# Patient Record
Sex: FEMALE | Race: OTHER | HISPANIC OR LATINO | Employment: FULL TIME | URBAN - METROPOLITAN AREA
[De-identification: names, ages, dates, MRNs, and addresses within clinical notes are randomized per-mention and may not be internally consistent; named-entity substitution may affect disease eponyms.]

---

## 2017-01-04 ENCOUNTER — OFFICE VISIT (OUTPATIENT)
Dept: PSYCHIATRY | Facility: CLINIC | Age: 27
End: 2017-01-04
Payer: COMMERCIAL

## 2017-01-04 VITALS
DIASTOLIC BLOOD PRESSURE: 58 MMHG | BODY MASS INDEX: 29.89 KG/M2 | SYSTOLIC BLOOD PRESSURE: 103 MMHG | WEIGHT: 148 LBS | HEART RATE: 87 BPM

## 2017-01-04 DIAGNOSIS — F41.1 GAD (GENERALIZED ANXIETY DISORDER): ICD-10-CM

## 2017-01-04 DIAGNOSIS — F80.81 STUTTERING: ICD-10-CM

## 2017-01-04 DIAGNOSIS — F84.5 ASPERGER SYNDROME: ICD-10-CM

## 2017-01-04 PROCEDURE — 1159F MED LIST DOCD IN RCRD: CPT | Mod: S$GLB,,, | Performed by: PSYCHIATRY & NEUROLOGY

## 2017-01-04 PROCEDURE — 99213 OFFICE O/P EST LOW 20 MIN: CPT | Mod: S$GLB,,, | Performed by: PSYCHIATRY & NEUROLOGY

## 2017-01-04 PROCEDURE — 99999 PR PBB SHADOW E&M-EST. PATIENT-LVL II: CPT | Mod: PBBFAC,,, | Performed by: PSYCHIATRY & NEUROLOGY

## 2017-01-04 PROCEDURE — 90833 PSYTX W PT W E/M 30 MIN: CPT | Mod: S$GLB,,, | Performed by: PSYCHIATRY & NEUROLOGY

## 2017-01-04 RX ORDER — CITALOPRAM 40 MG/1
40 TABLET, FILM COATED ORAL DAILY
Qty: 90 TABLET | Refills: 0 | Status: SHIPPED | OUTPATIENT
Start: 2017-01-04 | End: 2017-05-02 | Stop reason: SDUPTHER

## 2017-01-04 NOTE — PROGRESS NOTES
Outpatient Psychiatry Follow-Up Visit (MD/NP)    1/4/2017    Clinical Status of Patient:  Outpatient (Ambulatory)    Chief Complaint:  Tammy Bonner is a 26 y.o. female who presents today for follow-up of anxiety primarily, hx of low mood, stuttering, asperger's associated issues        Met with patient and mother.      Interval History and Content of Current Session:  Interim Events/Subjective Report/Content of Current Session:     Pt reports she is now working full time at a different branch of the [employer].  She is getting along well with others there and thinks it is a good environment.  She feel that she is getting better at her job.      She reports her mood has been good.  She reports crying is less frequent.  She reports she has lost her temper once or twice lately, typically around her menstrual cycle related to stressing about things she would not typically worry about.  She worries about being able to get  and take the honeymoon they want to [place].  She sleeps pretty well though it is variable.  Manages to make it to work on time.  She denies recent SI.    She is working on eating less at night and portion control.      Psychotherapy:  · Target symptoms: mood swings, impulsivity, SI  · Why chosen therapy is appropriate versus another modality: relevant to diagnosis, patient responds to this modality  · Outcome monitoring methods: self-report, observation  · Therapeutic intervention type: supportive psychotherapy  · Topics discussed/themes: work stress, difficulty managing affect in interpersonal relationships, building skills sets for symptom management, financial stressors, life stage transitional issues.    · The patient's response to the intervention is accepting. The patient's progress toward treatment goals is good.   · Duration of intervention: 16 mins    Review of Systems   · PSYCHIATRIC: Pertinant items are noted in the narrative.  · RESPIRATORY: No shortness of breath.  · CARDIOVASCULAR:  No tachycardia or chest pain.  · GASTROINTESTINAL: No nausea, vomiting, pain, constipation or diarrhea.    Past Medical, Family and Social History: The patient's past medical, family and social history have been reviewed and updated as appropriate within the electronic medical record - see encounter notes.    Compliance: yes  Side effects: None    Risk Parameters:  Patient reports no suicidal ideation  Patient reports no homicidal ideation  Patient reports no self-injurious behavior  Patient reports no violent behavior    Exam (detailed: at least 9 elements; comprehensive: all 15 elements)   Constitutional  Vitals:  Most recent vital signs, dated less than 90 days prior to this appointment, were reviewed.    Vitals:    01/04/17 0844   BP: (!) 103/58   Pulse: 87   Weight: 67.1 kg (148 lb)      General:  age appropriate, casually dressed, neatly groomed, uniform belen shirt     Musculoskeletal  Muscle Strength/Tone:  no tremor   Gait & Station:  normal, no ataxia     Psychiatric  Speech:  clearly audible, quite verbal, some mild studder, at times rapid   Mood:   Affect:  euthymic  congruent and appropriate   Thought Process:  logical   Associations:  intact   Thought Content:  normal, no suicidality, no homicidality, delusions, or paranoia   Insight:   Judgement:  fair  adequate to circumstances   Orientation:  grossly intact   Memory: intact for content of interview   Language: grossly intact   Attention Span & Concentration:  able to focus   Fund of Knowledge:  intact and appropriate to age and level of education     Assessment and Diagnosis   Status/Progress: Based on the examination today, the patient's problem(s) is/are adequately but not ideally controlled.  New problems have been presented today.   Comorbidities are complicating management of the primary condition:Asperger's complicating her DORIS.  There are no active rule-out diagnoses for this patient at this time.    General Impression: 26 y.o. F with  Asperger's and DORIS.  DORIS relatively well controlled currently.   Has some problems with anger at times, typically with parents.      Diagnosis:  Pervasive Developmental Disorders: Asperger's Disorder (299.80)  Generalized Anxiety Disorder (300.02).  stuttering       Intervention/Counseling/Treatment Plan   · Continue current medication, citalopram 40mg daily.  · Discussed psychotherapy referral if ever needed.      Return to Clinic: 3 months

## 2017-01-09 ENCOUNTER — OFFICE VISIT (OUTPATIENT)
Dept: SURGERY | Facility: CLINIC | Age: 27
End: 2017-01-09
Payer: COMMERCIAL

## 2017-01-09 VITALS
SYSTOLIC BLOOD PRESSURE: 108 MMHG | DIASTOLIC BLOOD PRESSURE: 67 MMHG | BODY MASS INDEX: 29.77 KG/M2 | HEART RATE: 95 BPM | HEIGHT: 59 IN | TEMPERATURE: 99 F | WEIGHT: 147.69 LBS

## 2017-01-09 DIAGNOSIS — Z12.39 SCREENING BREAST EXAMINATION: Primary | ICD-10-CM

## 2017-01-09 DIAGNOSIS — D24.9 FIBROADENOMA OF BREAST, UNSPECIFIED LATERALITY: ICD-10-CM

## 2017-01-09 PROCEDURE — 99213 OFFICE O/P EST LOW 20 MIN: CPT | Mod: S$GLB,,, | Performed by: NURSE PRACTITIONER

## 2017-01-09 PROCEDURE — 1159F MED LIST DOCD IN RCRD: CPT | Mod: S$GLB,,, | Performed by: NURSE PRACTITIONER

## 2017-01-09 PROCEDURE — 99999 PR PBB SHADOW E&M-EST. PATIENT-LVL III: CPT | Mod: PBBFAC,,, | Performed by: NURSE PRACTITIONER

## 2017-01-09 NOTE — PROGRESS NOTES
Subjective:      Patient ID: Tammy Bonner is a 26 y.o. female.    Chief Complaint: Breast Cancer Screening (CBE)      HPI: (PF, EPF - 1-3) (Detailed, Comp, - 4)  returning patient presents for f/u, bilateral masses at 3 o'clock consistent with fibroadenomas, see previous notes for history. She reports no new mass or change in size of these two findings. Denies breast pain, nipple discharge, skin changes.     LMP 1-5-2017      Review of Systems  Objective:   Physical Exam   Pulmonary/Chest: She exhibits deformity and retraction. She exhibits no mass, no tenderness, no edema and no swelling. Right breast exhibits mass. Right breast exhibits no inverted nipple, no nipple discharge and no tenderness. Left breast exhibits mass. Left breast exhibits no inverted nipple, no nipple discharge, no skin change and no tenderness. There is no breast swelling.   Small, oval, 8mm mass right at 3 o'clock and 1.0cm left at 3 o'clock , both mobile, nontender and clinically consistent with benign FA   No upper extremity lymphedema  Breathing non-labored    Lymphadenopathy:     She has no cervical adenopathy.     She has no axillary adenopathy.        Right: No supraclavicular adenopathy present.        Left: No supraclavicular adenopathy present.     Assessment:       1. Screening breast examination    2. Fibroadenoma of breast, unspecified laterality        Plan:     bilateral breast masses stable, consistent with FA  Return in one year CBE   Call for any interval palpable breast mass, pain, nipple discharge, skin changes or other breast related concerns

## 2017-05-02 ENCOUNTER — OFFICE VISIT (OUTPATIENT)
Dept: PSYCHIATRY | Facility: CLINIC | Age: 27
End: 2017-05-02
Payer: COMMERCIAL

## 2017-05-02 ENCOUNTER — PATIENT MESSAGE (OUTPATIENT)
Dept: INTERNAL MEDICINE | Facility: CLINIC | Age: 27
End: 2017-05-02

## 2017-05-02 VITALS
DIASTOLIC BLOOD PRESSURE: 61 MMHG | SYSTOLIC BLOOD PRESSURE: 112 MMHG | HEIGHT: 59 IN | WEIGHT: 148.19 LBS | HEART RATE: 89 BPM | BODY MASS INDEX: 29.88 KG/M2

## 2017-05-02 DIAGNOSIS — F41.1 GAD (GENERALIZED ANXIETY DISORDER): ICD-10-CM

## 2017-05-02 DIAGNOSIS — F80.81 STUTTERING: ICD-10-CM

## 2017-05-02 DIAGNOSIS — F84.5 ASPERGER SYNDROME: ICD-10-CM

## 2017-05-02 PROCEDURE — 90833 PSYTX W PT W E/M 30 MIN: CPT | Mod: S$GLB,,, | Performed by: PSYCHIATRY & NEUROLOGY

## 2017-05-02 PROCEDURE — 99213 OFFICE O/P EST LOW 20 MIN: CPT | Mod: S$GLB,,, | Performed by: PSYCHIATRY & NEUROLOGY

## 2017-05-02 PROCEDURE — 99999 PR PBB SHADOW E&M-EST. PATIENT-LVL II: CPT | Mod: PBBFAC,,, | Performed by: PSYCHIATRY & NEUROLOGY

## 2017-05-02 RX ORDER — CITALOPRAM 40 MG/1
40 TABLET, FILM COATED ORAL DAILY
Qty: 90 TABLET | Refills: 0 | Status: SHIPPED | OUTPATIENT
Start: 2017-05-02 | End: 2017-07-31 | Stop reason: SDUPTHER

## 2017-05-02 NOTE — PROGRESS NOTES
Outpatient Psychiatry Follow-Up Visit (MD/NP)    5/2/2017    Clinical Status of Patient:  Outpatient (Ambulatory)    Chief Complaint:  Tammy Bonner is a 26 y.o. female who presents today for follow-up of anxiety primarily, hx of low mood, stuttering, asperger's associated issues        Met with patient.      Interval History and Content of Current Session:  Interim Events/Subjective Report/Content of Current Session:   Pt reports she has been doing well.  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  She reports procrastination can be severe at times ending up doing obligations later in the night than planned.  She has some difficulty getting up in the morning , hitting the snooze button.  She has a lack of disciplining herself.  She gets 7 hrs of sleep at night but in the morning she feels like staying in bed.      She reports an episode of near fainting at home during a stressful period.  She admits to some crying spells, sometimes when she and her parents have a conflict, usually around her menstrual cycle.  She admits to some worry related to her relationship.  She denies having had any recent SI.      Reports her relationship is going well.  Some disappointment with financial progress toward wedding and dealing with bf's grandmother.      Psychotherapy:  · Target symptoms: mood swings, impulsivity, SI  · Why chosen therapy is appropriate versus another modality: relevant to diagnosis, patient responds to this modality  · Outcome monitoring methods: self-report, observation  · Therapeutic intervention type: supportive psychotherapy  · Topics discussed/themes: relationships difficulties, difficulty managing affect in interpersonal relationships, building skills sets for symptom management, symptom recognition, financial stressors, life stage transitional issues.    · The patient's response to the intervention  "is accepting. The patient's progress toward treatment goals is good.   · Duration of intervention: 16 mins    Review of Systems   · PSYCHIATRIC: Pertinant items are noted in the narrative.  · RESPIRATORY: No shortness of breath.  · CARDIOVASCULAR: No tachycardia or chest pain.  · GASTROINTESTINAL: No nausea, vomiting, pain, constipation or diarrhea.    Past Medical, Family and Social History: The patient's past medical, family and social history have been reviewed and updated as appropriate within the electronic medical record - see encounter notes.    Compliance: yes  Side effects: None    Risk Parameters:  Patient reports no suicidal ideation  Patient reports no homicidal ideation  Patient reports no self-injurious behavior  Patient reports no violent behavior    Exam (detailed: at least 9 elements; comprehensive: all 15 elements)   Constitutional  Vitals:  Most recent vital signs, dated less than 90 days prior to this appointment, were reviewed.    Vitals:    05/02/17 0842   BP: 112/61   Pulse: 89   Weight: 67.2 kg (148 lb 3.2 oz)   Height: 4' 11" (1.499 m)        General:  age appropriate, casually dressed, neatly groomed, uniform belen shirt     Musculoskeletal  Muscle Strength/Tone:  no tremor   Gait & Station:  normal, no ataxia     Psychiatric  Speech:  clearly audible, quite verbal, some mild studder, at times rapid   Mood:   Affect:  euthymic  congruent and appropriate   Thought Process:  logical   Associations:  intact   Thought Content:  normal, no suicidality, no homicidality, delusions, or paranoia   Insight:   Judgement:  fair  adequate to circumstances   Orientation:  grossly intact   Memory: intact for content of interview   Language: grossly intact   Attention Span & Concentration:  able to focus   Fund of Knowledge:  intact and appropriate to age and level of education     Assessment and Diagnosis   Status/Progress: Based on the examination today, the patient's problem(s) is/are adequately but not " ideally controlled.  New problems have been presented today.   Comorbidities are complicating management of the primary condition:Asperger's complicating her DORIS.  There are no active rule-out diagnoses for this patient at this time.    General Impression: 26 y.o. F with Asperger's and DORIS.  DORIS relatively well controlled currently.   Has some problems with anger at times, typically with parents.      Diagnosis:  Pervasive Developmental Disorders: Asperger's Disorder (299.80)  Generalized Anxiety Disorder (300.02).         Intervention/Counseling/Treatment Plan   · Continue current medication, citalopram 40mg daily.  · Encouraged attempting to getting to bed earlier and achieve 8 hrs of sleep.    · Again discussed psychotherapy referral if ever needed.      Return to Clinic: 3 months

## 2017-05-03 DIAGNOSIS — Z00.00 ANNUAL PHYSICAL EXAM: Primary | ICD-10-CM

## 2017-05-22 ENCOUNTER — LAB VISIT (OUTPATIENT)
Dept: LAB | Facility: HOSPITAL | Age: 27
End: 2017-05-22
Attending: INTERNAL MEDICINE
Payer: COMMERCIAL

## 2017-05-22 DIAGNOSIS — Z00.00 ANNUAL PHYSICAL EXAM: ICD-10-CM

## 2017-05-22 LAB
ALBUMIN SERPL BCP-MCNC: 3.7 G/DL
ALP SERPL-CCNC: 63 U/L
ALT SERPL W/O P-5'-P-CCNC: 15 U/L
ANION GAP SERPL CALC-SCNC: 9 MMOL/L
AST SERPL-CCNC: 19 U/L
BASOPHILS # BLD AUTO: 0.05 K/UL
BASOPHILS NFR BLD: 0.8 %
BILIRUB DIRECT SERPL-MCNC: 0.2 MG/DL
BILIRUB SERPL-MCNC: 0.4 MG/DL
BUN SERPL-MCNC: 12 MG/DL
CALCIUM SERPL-MCNC: 9.9 MG/DL
CHLORIDE SERPL-SCNC: 102 MMOL/L
CHOLEST/HDLC SERPL: 2.5 {RATIO}
CO2 SERPL-SCNC: 27 MMOL/L
CREAT SERPL-MCNC: 0.8 MG/DL
DIFFERENTIAL METHOD: NORMAL
EOSINOPHIL # BLD AUTO: 0.2 K/UL
EOSINOPHIL NFR BLD: 2.5 %
ERYTHROCYTE [DISTWIDTH] IN BLOOD BY AUTOMATED COUNT: 12.7 %
EST. GFR  (AFRICAN AMERICAN): >60 ML/MIN/1.73 M^2
EST. GFR  (NON AFRICAN AMERICAN): >60 ML/MIN/1.73 M^2
GLUCOSE SERPL-MCNC: 89 MG/DL
HCT VFR BLD AUTO: 39.4 %
HDL/CHOLESTEROL RATIO: 39.7 %
HDLC SERPL-MCNC: 209 MG/DL
HDLC SERPL-MCNC: 83 MG/DL
HGB BLD-MCNC: 13.7 G/DL
LDLC SERPL CALC-MCNC: 96.4 MG/DL
LYMPHOCYTES # BLD AUTO: 2 K/UL
LYMPHOCYTES NFR BLD: 31.7 %
MCH RBC QN AUTO: 30 PG
MCHC RBC AUTO-ENTMCNC: 34.8 %
MCV RBC AUTO: 86 FL
MONOCYTES # BLD AUTO: 0.6 K/UL
MONOCYTES NFR BLD: 10.1 %
NEUTROPHILS # BLD AUTO: 3.5 K/UL
NEUTROPHILS NFR BLD: 54.6 %
NONHDLC SERPL-MCNC: 126 MG/DL
PLATELET # BLD AUTO: 304 K/UL
PMV BLD AUTO: 9.3 FL
POTASSIUM SERPL-SCNC: 4.3 MMOL/L
PROT SERPL-MCNC: 7.9 G/DL
RBC # BLD AUTO: 4.56 M/UL
SODIUM SERPL-SCNC: 138 MMOL/L
TRIGL SERPL-MCNC: 148 MG/DL
TSH SERPL DL<=0.005 MIU/L-ACNC: 1.39 UIU/ML
WBC # BLD AUTO: 6.35 K/UL

## 2017-05-22 PROCEDURE — 80048 BASIC METABOLIC PNL TOTAL CA: CPT

## 2017-05-22 PROCEDURE — 36415 COLL VENOUS BLD VENIPUNCTURE: CPT

## 2017-05-22 PROCEDURE — 80061 LIPID PANEL: CPT

## 2017-05-22 PROCEDURE — 85025 COMPLETE CBC W/AUTO DIFF WBC: CPT

## 2017-05-22 PROCEDURE — 80076 HEPATIC FUNCTION PANEL: CPT

## 2017-05-22 PROCEDURE — 84443 ASSAY THYROID STIM HORMONE: CPT

## 2017-06-01 ENCOUNTER — OFFICE VISIT (OUTPATIENT)
Dept: INTERNAL MEDICINE | Facility: CLINIC | Age: 27
End: 2017-06-01
Payer: COMMERCIAL

## 2017-06-01 VITALS
DIASTOLIC BLOOD PRESSURE: 64 MMHG | WEIGHT: 148.81 LBS | HEIGHT: 59 IN | HEART RATE: 80 BPM | BODY MASS INDEX: 30 KG/M2 | SYSTOLIC BLOOD PRESSURE: 104 MMHG

## 2017-06-01 DIAGNOSIS — D24.9 FIBROADENOMA OF BREAST, UNSPECIFIED LATERALITY: ICD-10-CM

## 2017-06-01 DIAGNOSIS — Z00.00 ANNUAL PHYSICAL EXAM: Primary | ICD-10-CM

## 2017-06-01 PROCEDURE — 99999 PR PBB SHADOW E&M-EST. PATIENT-LVL III: CPT | Mod: PBBFAC,,, | Performed by: INTERNAL MEDICINE

## 2017-06-01 PROCEDURE — 99395 PREV VISIT EST AGE 18-39: CPT | Mod: S$GLB,,, | Performed by: INTERNAL MEDICINE

## 2017-06-01 RX ORDER — CLOBETASOL PROPIONATE 0.5 MG/G
OINTMENT TOPICAL 2 TIMES DAILY PRN
Qty: 45 G | Refills: 3 | Status: SHIPPED | OUTPATIENT
Start: 2017-06-01 | End: 2018-04-17

## 2017-07-31 DIAGNOSIS — F84.5 ASPERGER SYNDROME: ICD-10-CM

## 2017-07-31 DIAGNOSIS — F41.1 GAD (GENERALIZED ANXIETY DISORDER): ICD-10-CM

## 2017-07-31 DIAGNOSIS — F80.81 STUTTERING: ICD-10-CM

## 2017-07-31 RX ORDER — CITALOPRAM 40 MG/1
40 TABLET, FILM COATED ORAL DAILY
Qty: 90 TABLET | Refills: 0 | Status: SHIPPED | OUTPATIENT
Start: 2017-07-31 | End: 2017-08-22 | Stop reason: SDUPTHER

## 2017-08-09 ENCOUNTER — CLINICAL SUPPORT (OUTPATIENT)
Dept: OPHTHALMOLOGY | Facility: CLINIC | Age: 27
End: 2017-08-09
Payer: COMMERCIAL

## 2017-08-09 ENCOUNTER — OFFICE VISIT (OUTPATIENT)
Dept: OPTOMETRY | Facility: CLINIC | Age: 27
End: 2017-08-09
Payer: COMMERCIAL

## 2017-08-09 DIAGNOSIS — H40.013 OPEN ANGLE WITH BORDERLINE FINDINGS, LOW RISK, BILATERAL: ICD-10-CM

## 2017-08-09 DIAGNOSIS — H52.13 MYOPIA OF BOTH EYES: ICD-10-CM

## 2017-08-09 DIAGNOSIS — H40.013 OPEN ANGLE WITH BORDERLINE FINDINGS, LOW RISK, BILATERAL: Primary | ICD-10-CM

## 2017-08-09 PROCEDURE — 99999 PR PBB SHADOW E&M-EST. PATIENT-LVL II: CPT | Mod: PBBFAC,,, | Performed by: OPTOMETRIST

## 2017-08-09 PROCEDURE — 92014 COMPRE OPH EXAM EST PT 1/>: CPT | Mod: S$GLB,,, | Performed by: OPTOMETRIST

## 2017-08-09 PROCEDURE — 92015 DETERMINE REFRACTIVE STATE: CPT | Mod: S$GLB,,, | Performed by: OPTOMETRIST

## 2017-08-09 PROCEDURE — 92133 CPTRZD OPH DX IMG PST SGM ON: CPT | Mod: S$GLB,,, | Performed by: OPTOMETRIST

## 2017-08-09 PROCEDURE — 92083 EXTENDED VISUAL FIELD XM: CPT | Mod: S$GLB,,, | Performed by: OPTOMETRIST

## 2017-08-09 PROCEDURE — 92020 GONIOSCOPY: CPT | Mod: S$GLB,,, | Performed by: OPTOMETRIST

## 2017-08-09 NOTE — PROGRESS NOTES
HPI     DLS: 8/2/2016  Pt states no noticeable vision changes since last visit. Denies f/f  No gtts   glauc susp by CDs    Last edited by Dru Gavin, OD on 8/9/2017  2:29 PM. (History)        ROS     Negative for: Constitutional, Gastrointestinal, Neurological, Skin,   Genitourinary, Musculoskeletal, HENT, Endocrine, Cardiovascular, Eyes   (glauc susp by CDs), Respiratory, Psychiatric, Allergic/Imm, Heme/Lymph    Last edited by Dru Gavin, OD on 8/9/2017  2:29 PM. (History)        Assessment /Plan     For exam results, see Encounter Report.    Open angle with borderline findings, low risk, bilateral  -     Padilla Visual Field - OU - Extended - Both Eyes; Future; Expected date: 08/09/2018  -     Posterior Segment OCT Optic Nerve- Both eyes; Future; Expected date: 08/09/2018    Myopia of both eyes      1. Glauc susp by CDs--see OCT (RNFL wnl OU).  iop hi normal without meds.  VF today  wnl.  +famhx (dad). Pach: 682/684.  Angles open by gonio OU.  Doubt glauc now--will monitor  2. Slight myopia--pt happy without spex         Plan:    rtc 1 yr for HVF 24-2 sf/OCT/full

## 2017-08-22 ENCOUNTER — OFFICE VISIT (OUTPATIENT)
Dept: PSYCHIATRY | Facility: CLINIC | Age: 27
End: 2017-08-22
Payer: COMMERCIAL

## 2017-08-22 VITALS
HEART RATE: 92 BPM | BODY MASS INDEX: 30.16 KG/M2 | WEIGHT: 149.63 LBS | DIASTOLIC BLOOD PRESSURE: 58 MMHG | HEIGHT: 59 IN | SYSTOLIC BLOOD PRESSURE: 108 MMHG

## 2017-08-22 DIAGNOSIS — F41.1 GAD (GENERALIZED ANXIETY DISORDER): ICD-10-CM

## 2017-08-22 DIAGNOSIS — F80.81 STUTTERING: ICD-10-CM

## 2017-08-22 DIAGNOSIS — F84.5 ASPERGER SYNDROME: ICD-10-CM

## 2017-08-22 PROCEDURE — 3008F BODY MASS INDEX DOCD: CPT | Mod: S$GLB,,, | Performed by: PSYCHIATRY & NEUROLOGY

## 2017-08-22 PROCEDURE — 99213 OFFICE O/P EST LOW 20 MIN: CPT | Mod: S$GLB,,, | Performed by: PSYCHIATRY & NEUROLOGY

## 2017-08-22 PROCEDURE — 99999 PR PBB SHADOW E&M-EST. PATIENT-LVL II: CPT | Mod: PBBFAC,,, | Performed by: PSYCHIATRY & NEUROLOGY

## 2017-08-22 RX ORDER — CITALOPRAM 40 MG/1
40 TABLET, FILM COATED ORAL DAILY
Qty: 90 TABLET | Refills: 0 | Status: SHIPPED | OUTPATIENT
Start: 2017-08-22 | End: 2017-12-12 | Stop reason: SDUPTHER

## 2017-08-22 NOTE — PROGRESS NOTES
Outpatient Psychiatry Follow-Up Visit (MD/NP)    8/22/2017    Clinical Status of Patient:  Outpatient (Ambulatory)    Chief Complaint:  Tammy Bonner is a 27 y.o. female who presents today for follow-up of anxiety primarily, hx of low mood, stuttering, asperger's associated issues        Met with patient.      Interval History and Content of Current Session:  Interim Events/Subjective Report/Content of Current Session:     Pt reports she has been doing well emotionally.  She recently had er menstrual cycle and was emotional  It was made wors by news of unrest in [place].  Seeing opinions of facebook caused her to think of how charles thinke about her.  She reports one crying spell over a conversation with her parents.  Reports her temper has been stable.  Still sleeps 6-7 hrs still being affected by procrastination.  Appetite is okay but she is trying to lose weight.  She denies having SI since our last appt.  Energy is sometimes a challenge especially after work.  She reports worry about events on the news.  She denies feelings of anxiety.  Denies panic attacks.     Work has been going well.  She realized her lack of confidence there is an obstacle.    \  Pt now has her drivers license. She is looking for a car.  Just went on vacation at [place].     Psychotherapy:  · Target symptoms: mood swings, impulsivity, SI  · Why chosen therapy is appropriate versus another modality: relevant to diagnosis, patient responds to this modality  · Outcome monitoring methods: self-report, observation  · Therapeutic intervention type: supportive psychotherapy  · Topics discussed/themes: relationships difficulties, difficulty managing affect in interpersonal relationships, building skills sets for symptom management, symptom recognition, financial stressors, life stage transitional issues.    · The patient's response to the intervention is accepting. The patient's progress toward treatment goals is good.   · Duration  "of intervention: 10 mins    Review of Systems   · PSYCHIATRIC: Pertinant items are noted in the narrative.  · RESPIRATORY: No shortness of breath.  · CARDIOVASCULAR: No tachycardia or chest pain.  · GASTROINTESTINAL: No nausea, vomiting, pain, constipation or diarrhea.    Past Medical, Family and Social History: The patient's past medical, family and social history have been reviewed and updated as appropriate within the electronic medical record - see encounter notes.    Compliance: yes    Side effects: None    Risk Parameters:  Patient reports no suicidal ideation  Patient reports no homicidal ideation  Patient reports no self-injurious behavior  Patient reports no violent behavior    Exam (detailed: at least 9 elements; comprehensive: all 15 elements)   Constitutional  Vitals:  Most recent vital signs, dated less than 90 days prior to this appointment, were reviewed.    Vitals:    08/22/17 0831   BP: (!) 108/58   Pulse: 92   Weight: 67.9 kg (149 lb 9.6 oz)   Height: 4' 11" (1.499 m)        General:  age appropriate, well dressed, neatly groomed, overweight     Musculoskeletal  Muscle Strength/Tone:  no tremor   Gait & Station:  normal, no ataxia     Psychiatric  Speech:  clearly audible, quite verbal, some mild studder, at times rapid   Mood:   Affect:  euthymic  congruent and appropriate   Thought Process:  logical   Associations:  intact   Thought Content:  normal, no suicidality, no homicidality, delusions, or paranoia   Insight:   Judgement:  fair  adequate to circumstances   Orientation:  grossly intact   Memory: intact for content of interview   Language: grossly intact   Attention Span & Concentration:  able to focus   Fund of Knowledge:  intact and appropriate to age and level of education     Assessment and Diagnosis   Status/Progress: Based on the examination today, the patient's problem(s) is/are adequately but not ideally controlled.  New problems have been presented today.   Comorbidities are " complicating management of the primary condition:Asperger's complicating her DORIS.  There are no active rule-out diagnoses for this patient at this time.    General Impression: 27 y.o. F with Asperger's and DORIS.  DORIS relatively well controlled currently.   Has some problems with anger at times, typically with parents.      Diagnosis:  Asperger's Disorder (299.80)  Generalized Anxiety Disorder (300.02).         Intervention/Counseling/Treatment Plan   · Continue current medication, citalopram 40mg daily.        Return to Clinic: 3 months

## 2017-10-28 DIAGNOSIS — Z30.41 ENCOUNTER FOR SURVEILLANCE OF CONTRACEPTIVE PILLS: ICD-10-CM

## 2017-10-30 RX ORDER — NORGESTIMATE AND ETHINYL ESTRADIOL 0.25-0.035
KIT ORAL
Qty: 84 TABLET | Refills: 0 | Status: SHIPPED | OUTPATIENT
Start: 2017-10-30 | End: 2017-12-05 | Stop reason: SDUPTHER

## 2017-10-31 ENCOUNTER — TELEPHONE (OUTPATIENT)
Dept: OBSTETRICS AND GYNECOLOGY | Facility: CLINIC | Age: 27
End: 2017-10-31

## 2017-11-01 ENCOUNTER — TELEPHONE (OUTPATIENT)
Dept: OBSTETRICS AND GYNECOLOGY | Facility: CLINIC | Age: 27
End: 2017-11-01

## 2017-11-01 NOTE — TELEPHONE ENCOUNTER
----- Message from Alyssa Da Silva sent at 11/1/2017 11:44 AM CDT -----  Contact: self  _x  1st Request  _  2nd Request  _  3rd Request    Who:pt    Why: pt returning call.. Please advise    What Number to Call Back: [phone]    When to Expect a call back: (Before the end of the day)   -- if call after 3:00 call back will be tomorrow.

## 2017-11-05 ENCOUNTER — PATIENT MESSAGE (OUTPATIENT)
Dept: OBSTETRICS AND GYNECOLOGY | Facility: CLINIC | Age: 27
End: 2017-11-05

## 2017-11-28 ENCOUNTER — TELEPHONE (OUTPATIENT)
Dept: SURGERY | Facility: CLINIC | Age: 27
End: 2017-11-28

## 2017-11-28 NOTE — TELEPHONE ENCOUNTER
----- Message from Anisa Macias sent at 11/28/2017 11:29 AM CST -----  Contact: self  Pt is calling in regards to scheduling an appt.     Pt can be reached at [phone].    Thank you

## 2017-11-28 NOTE — TELEPHONE ENCOUNTER
Returned the patient call regarding the message below.  The patient did not answer, message left for the patient to contact our office regarding this matter.

## 2017-12-05 ENCOUNTER — OFFICE VISIT (OUTPATIENT)
Dept: OBSTETRICS AND GYNECOLOGY | Facility: CLINIC | Age: 27
End: 2017-12-05
Payer: COMMERCIAL

## 2017-12-05 VITALS
BODY MASS INDEX: 29.23 KG/M2 | WEIGHT: 145 LBS | SYSTOLIC BLOOD PRESSURE: 124 MMHG | HEIGHT: 59 IN | DIASTOLIC BLOOD PRESSURE: 78 MMHG

## 2017-12-05 DIAGNOSIS — Z01.419 WELL WOMAN EXAM WITH ROUTINE GYNECOLOGICAL EXAM: Primary | ICD-10-CM

## 2017-12-05 DIAGNOSIS — Z11.3 SCREENING FOR STDS (SEXUALLY TRANSMITTED DISEASES): ICD-10-CM

## 2017-12-05 DIAGNOSIS — Z30.41 ENCOUNTER FOR SURVEILLANCE OF CONTRACEPTIVE PILLS: ICD-10-CM

## 2017-12-05 LAB
C TRACH DNA SPEC QL NAA+PROBE: NOT DETECTED
N GONORRHOEA DNA SPEC QL NAA+PROBE: NOT DETECTED

## 2017-12-05 PROCEDURE — 87591 N.GONORRHOEAE DNA AMP PROB: CPT

## 2017-12-05 PROCEDURE — 99395 PREV VISIT EST AGE 18-39: CPT | Mod: S$GLB,,, | Performed by: NURSE PRACTITIONER

## 2017-12-05 PROCEDURE — 99999 PR PBB SHADOW E&M-EST. PATIENT-LVL III: CPT | Mod: PBBFAC,,, | Performed by: NURSE PRACTITIONER

## 2017-12-05 RX ORDER — NORGESTIMATE AND ETHINYL ESTRADIOL 0.25-0.035
1 KIT ORAL DAILY
Qty: 84 TABLET | Refills: 4 | Status: SHIPPED | OUTPATIENT
Start: 2017-12-05 | End: 2018-12-14 | Stop reason: SDUPTHER

## 2017-12-05 NOTE — PROGRESS NOTES
HISTORY OF PRESENT ILLNESS:    Tammy Bonner is a 27 y.o. female, , Patient's last menstrual period was 2017 (approximate).,  presents for a routine exam and OCP refills.  -Requests STD screening.    Past Medical History:   Diagnosis Date    Abnormal cervical Papanicolaou smear     Re-pap    ALLERGIC RHINITIS     Anxiety     Asperger's syndrome     learning disability    Depression     Fibroadenoma of right breast     Glaucoma suspect        Past Surgical History:   Procedure Laterality Date    BREAST BIOPSY      Right - fiboradenoma    WISDOM TOOTH EXTRACTION         MEDICATIONS AND ALLERGIES:      Current Outpatient Prescriptions:     citalopram (CELEXA) 40 MG tablet, Take 1 tablet (40 mg total) by mouth once daily., Disp: 90 tablet, Rfl: 0    norgestimate-ethinyl estradiol (ESTARYLLA) 0.25-35 mg-mcg per tablet, Take 1 tablet by mouth once daily., Disp: 84 tablet, Rfl: 4    clobetasol 0.05% (TEMOVATE) 0.05 % Oint, Apply topically 2 (two) times daily as needed., Disp: 45 g, Rfl: 3    Review of patient's allergies indicates:   Allergen Reactions    No known drug allergies        Family History   Problem Relation Age of Onset    Hypertension Mother     Diabetes Mother     Glaucoma Father     Cancer Other      ovarian cancer     Breast cancer Neg Hx     Ovarian cancer Neg Hx     Colon cancer Neg Hx        Social History     Social History    Marital status: Single     Spouse name: N/A    Number of children: N/A    Years of education: N/A     Occupational History    Not on file.     Social History Main Topics    Smoking status: Never Smoker    Smokeless tobacco: Never Used    Alcohol use No    Drug use: No    Sexual activity: Yes     Partners: Female     Birth control/ protection: OCP     Other Topics Concern    Not on file     Social History Narrative    No narrative on file       OB HISTORY: None.     COMPREHENSIVE GYN HISTORY:  PAP History: Denies abnormal Paps.  "LAST PAP 8-9-16 NORMAL.  Infection History: Denies STDs. Denies PID.  Benign History: Denies uterine fibroids. Denies ovarian cysts. Denies endometriosis. Denies other conditions.  Cancer History: Denies cervical cancer. Denies uterine cancer or hyperplasia. Denies ovarian cancer. Denies vulvar cancer or pre-cancer. Denies vaginal cancer or pre-cancer. Denies breast cancer. Denies colon cancer.  Sexual Activity History: Reports currently being sexually active  Menstrual History: Monthly. Mod then light flow.   Dysmenorrhea History: Reports mild dysmenorrhea.   Contraception: OCPs.    ROS:  GENERAL: No weight changes. No swelling. No fatigue. No fever.  CARDIOVASCULAR: No chest pain. No shortness of breath. No leg cramps.   NEUROLOGICAL: No headaches. No vision changes.  BREASTS: No pain. No lumps. No discharge.  ABDOMEN: No pain. No nausea. No vomiting. No diarrhea. No constipation.  REPRODUCTIVE: No abnormal bleeding.   VULVA: No pain. No lesions. No itching.  VAGINA: No relaxation. No itching. No odor. No discharge. No lesions.  URINARY: No incontinence. No nocturia. No frequency. No dysuria.    /78   Ht 4' 11" (1.499 m)   Wt 65.8 kg (145 lb)   LMP 11/07/2017 (Approximate)   BMI 29.29 kg/m²     PE:  APPEARANCE: Well nourished, well developed, in no acute distress.  AFFECT: WNL, alert and oriented x 3.  SKIN: No acne or hirsutism.  NECK: Neck symmetric, without masses or thyromegaly.  NODES: No inguinal, cervical, axillary or femoral lymph node enlargement.  CHEST: Good respiratory effort.   ABDOMEN: Soft. No tenderness or masses.   BREASTS: Symmetrical, no skin changes, visible lesions, palpable masses or nipple discharge bilaterally. BILATERAL F.C. CHANGES. SCAR RIGHT BREAST.   PELVIC: External female genitalia without lesions.  Female hair distribution. Adequate perineal body, Normal urethral meatus. Vagina moist and well rugated without lesions. MUCOID D/C present.  No significant cystocele or " rectocele present. Cervix pink without lesions, discharge or tenderness. Uterus is 4-6 week size, regular, mobile and nontender. Adnexa without masses or tenderness.  EXTREMITIES: No edema    DIAGNOSIS:  1. Well woman exam with routine gynecological exam    2. Encounter for surveillance of contraceptive pills    3. Screening for STDs (sexually transmitted diseases)        PLAN:    Orders Placed This Encounter    C. trachomatis/N. gonorrhoeae by AMP DNA Cervix    norgestimate-ethinyl estradiol (ESTARYLLA) 0.25-35 mg-mcg per tablet   declined other STD tests    COUNSELING:  The patient was counseled today on:  -OCP use and potential side effects;  -STDs and prevention including the Gardasil vaccine (has completed 3/3);  -A.C.S. Pap and pelvic exam guidelines (pap every 3 years);  -to follow up with her PCP for other health maintenance.    FOLLOW-UP with me annually.

## 2017-12-12 ENCOUNTER — OFFICE VISIT (OUTPATIENT)
Dept: PSYCHIATRY | Facility: CLINIC | Age: 27
End: 2017-12-12
Payer: COMMERCIAL

## 2017-12-12 VITALS
SYSTOLIC BLOOD PRESSURE: 104 MMHG | DIASTOLIC BLOOD PRESSURE: 55 MMHG | HEART RATE: 84 BPM | HEIGHT: 59 IN | WEIGHT: 146.38 LBS | BODY MASS INDEX: 29.51 KG/M2

## 2017-12-12 DIAGNOSIS — F84.5 ASPERGER SYNDROME: ICD-10-CM

## 2017-12-12 DIAGNOSIS — F80.81 STUTTERING: ICD-10-CM

## 2017-12-12 DIAGNOSIS — F41.1 GAD (GENERALIZED ANXIETY DISORDER): Primary | ICD-10-CM

## 2017-12-12 PROCEDURE — 99214 OFFICE O/P EST MOD 30 MIN: CPT | Mod: S$GLB,,, | Performed by: PSYCHIATRY & NEUROLOGY

## 2017-12-12 PROCEDURE — 99999 PR PBB SHADOW E&M-EST. PATIENT-LVL II: CPT | Mod: PBBFAC,,, | Performed by: PSYCHIATRY & NEUROLOGY

## 2017-12-12 RX ORDER — CITALOPRAM 40 MG/1
40 TABLET, FILM COATED ORAL DAILY
Qty: 90 TABLET | Refills: 1 | Status: SHIPPED | OUTPATIENT
Start: 2017-12-12 | End: 2018-06-01 | Stop reason: SDUPTHER

## 2017-12-12 NOTE — PROGRESS NOTES
"Outpatient Psychiatry Follow-Up Visit (MD/NP)    12/12/2017    Clinical Status of Patient:  Outpatient (Ambulatory)    Chief Complaint:  Tammy Bonner is a 27 y.o. female who presents today for follow-up of anxiety primarily, hx of low mood, stuttering, asperger's associated issues        Met with patient.      Interval History and Content of Current Session:  Interim Events/Subjective Report/Content of Current Session:    Pt reports she has been well.  Still working and now doing some [employer] shopping.   She has had some period of feeling overwhelmed at work.  She feels comfortable and supported by her coworkers who are able to notice when she is not doing well.  She is at times hard on herself if she is having trouble catching on to how to do something.  She has no outbursts or crying at work, just becomes a "negative Aileen".  Seems to be worse during her menstrual cycle.  She reports infrequent conflicts at home. Denies recent SI or desire for death. She has a little more time for Jazz[employer] now and is watching her intake.  She sleeps "okay" but is still struggling with getting to bed too late.      She worries about whether she is able to think straight or correctly, or if something is wrong with her mentally.  Denies worry about others wellbeing.      She is still looking at cars and expects to buy one next year.        Psychotherapy:  · Target symptoms: mood swings, impulsivity, SI  · Why chosen therapy is appropriate versus another modality: relevant to diagnosis, patient responds to this modality  · Outcome monitoring methods: self-report, observation  · Therapeutic intervention type: supportive psychotherapy  · Topics discussed/themes: difficulty managing affect in interpersonal relationships, building skills sets for symptom management, symptom recognition.    · The patient's response to the intervention is accepting. The patient's progress toward treatment goals is good.   · Duration of " "intervention: 10 mins    Review of Systems   · PSYCHIATRIC: Pertinant items are noted in the narrative.  · CONSTITUTIONAL: No weight gain or loss.   · RESPIRATORY: No shortness of breath.  · CARDIOVASCULAR: No tachycardia or chest pain.  · GASTROINTESTINAL: No nausea, vomiting, pain, constipation or diarrhea.    Past Medical, Family and Social History: The patient's past medical, family and social history have been reviewed and updated as appropriate within the electronic medical record - see encounter notes.    Compliance: yes    Side effects: None    Risk Parameters:  Patient reports no suicidal ideation  Patient reports no homicidal ideation  Patient reports no self-injurious behavior  Patient reports no violent behavior    Exam (detailed: at least 9 elements; comprehensive: all 15 elements)   Constitutional  Vitals:  Most recent vital signs, dated less than 90 days prior to this appointment, were reviewed.    Vitals:    12/12/17 0832   BP: (!) 104/55   Pulse: 84   Weight: 66.4 kg (146 lb 6.4 oz)   Height: 4' 11" (1.499 m)        General:  age appropriate, well dressed, neatly groomed, overweight     Musculoskeletal  Muscle Strength/Tone:  no tremor   Gait & Station:  normal, no ataxia     Psychiatric  Speech:  clearly audible, quite verbal, some mild studder, at times rapid   Mood:   Affect:  euthymic  congruent and appropriate   Thought Process:  logical   Associations:  intact   Thought Content:  normal, no suicidality, no homicidality, delusions, or paranoia   Insight:   Judgement:  fair  adequate to circumstances   Orientation:  grossly intact   Memory: intact for content of interview   Language: grossly intact   Attention Span & Concentration:  able to focus   Fund of Knowledge:  intact and appropriate to age and level of education     Assessment and Diagnosis   Status/Progress: Based on the examination today, the patient's problem(s) is/are adequately but not ideally controlled.  New problems have been " presented today.   Comorbidities are complicating management of the primary condition:Asperger's complicating her DORIS.  There are no active rule-out diagnoses for this patient at this time.    General Impression: 27 y.o. F with Asperger's and DORIS.  DORIS relatively well controlled currently.   Has some problems with anger at times, typically with parents.      Diagnosis:  Asperger's Disorder (299.80)  Generalized Anxiety Disorder (300.02).         Intervention/Counseling/Treatment Plan   · Continue current medication, citalopram 40mg daily.        Return to Clinic: 4 months

## 2018-01-10 ENCOUNTER — OFFICE VISIT (OUTPATIENT)
Dept: SURGERY | Facility: CLINIC | Age: 28
End: 2018-01-10
Payer: COMMERCIAL

## 2018-01-10 VITALS
HEIGHT: 59 IN | BODY MASS INDEX: 28.83 KG/M2 | WEIGHT: 143 LBS | TEMPERATURE: 99 F | SYSTOLIC BLOOD PRESSURE: 102 MMHG | HEART RATE: 83 BPM | DIASTOLIC BLOOD PRESSURE: 64 MMHG

## 2018-01-10 DIAGNOSIS — N63.0 BREAST MASS: Primary | ICD-10-CM

## 2018-01-10 PROCEDURE — 99212 OFFICE O/P EST SF 10 MIN: CPT | Mod: S$GLB,,, | Performed by: NURSE PRACTITIONER

## 2018-01-10 PROCEDURE — 99999 PR PBB SHADOW E&M-EST. PATIENT-LVL III: CPT | Mod: PBBFAC,,, | Performed by: NURSE PRACTITIONER

## 2018-01-10 NOTE — PROGRESS NOTES
Subjective:      Patient ID: Tammy Bonner is a 27 y.o. female.    Chief Complaint: Breast Cancer Screening (CBE)      HPI: (PF, EPF - 1-3) (Detailed, Comp, - 4)  returning patient presents for f/u, bilateral masses at 3 o'clock consistent with fibroadenomas, see previous notes for history. She reports no new mass, can only feel lump right breast which is unchanged to the patient. Denies breast pain, nipple discharge, skin changes.        Review of Systems  Objective:   Physical Exam   Pulmonary/Chest: Right breast exhibits no inverted nipple, no nipple discharge, no skin change and no tenderness. Left breast exhibits no inverted nipple, no nipple discharge, no skin change and no tenderness. Breasts are symmetrical. There is no breast swelling.   Small 8mm oval mobile nontender mass left breast at 3 o'clock, similar 1.0cm mass right breast at 3 o'clock, no associated skin changes   Lymphadenopathy:     She has no cervical adenopathy.     She has no axillary adenopathy.        Right: No supraclavicular adenopathy present.        Left: No supraclavicular adenopathy present.     Assessment:       1. Breast mass        Plan:       Stable breast masses consistent with benign FA  Return in one year for CBE  Call for any interval palpable breast mass, pain, nipple discharge, skin changes or other breast related concerns

## 2018-01-12 ENCOUNTER — TELEPHONE (OUTPATIENT)
Dept: INTERNAL MEDICINE | Facility: CLINIC | Age: 28
End: 2018-01-12

## 2018-01-12 DIAGNOSIS — F41.9 ANXIETY: Primary | ICD-10-CM

## 2018-01-12 NOTE — TELEPHONE ENCOUNTER
Mother called  And worried about daughter  Anxiety  She is seeing psychiatry and will continue to due so  Discussed psychology appt referral placed  For counseling

## 2018-02-06 ENCOUNTER — PATIENT MESSAGE (OUTPATIENT)
Dept: INTERNAL MEDICINE | Facility: CLINIC | Age: 28
End: 2018-02-06

## 2018-03-27 ENCOUNTER — PATIENT MESSAGE (OUTPATIENT)
Dept: INTERNAL MEDICINE | Facility: CLINIC | Age: 28
End: 2018-03-27

## 2018-03-28 NOTE — TELEPHONE ENCOUNTER
Pt called swollen gland  No fever or sore throat   Has had for 2 days has had before on and off was concerned it persisted for 2 days  She is working today   Will call to [facility] if no resolution or concerns

## 2018-03-29 ENCOUNTER — OFFICE VISIT (OUTPATIENT)
Dept: INTERNAL MEDICINE | Facility: CLINIC | Age: 28
End: 2018-03-29
Payer: COMMERCIAL

## 2018-03-29 VITALS
SYSTOLIC BLOOD PRESSURE: 116 MMHG | BODY MASS INDEX: 29.07 KG/M2 | HEIGHT: 59 IN | OXYGEN SATURATION: 98 % | DIASTOLIC BLOOD PRESSURE: 68 MMHG | WEIGHT: 144.19 LBS | HEART RATE: 77 BPM | TEMPERATURE: 98 F

## 2018-03-29 DIAGNOSIS — R60.0 SUBMANDIBULAR GLAND SWELLING: Primary | ICD-10-CM

## 2018-03-29 PROCEDURE — 99213 OFFICE O/P EST LOW 20 MIN: CPT | Mod: S$GLB,,, | Performed by: NURSE PRACTITIONER

## 2018-03-29 PROCEDURE — 99999 PR PBB SHADOW E&M-EST. PATIENT-LVL III: CPT | Mod: PBBFAC,,, | Performed by: NURSE PRACTITIONER

## 2018-03-29 NOTE — PATIENT INSTRUCTIONS
Encouraged use of warm compresses and tart candies to help salivary flow.    Increase fluid intake.    Advil for pain and swelling.

## 2018-03-29 NOTE — PROGRESS NOTES
Subjective:       Patient ID: Tammy Bonner is a 27 y.o. female.    Chief Complaint: Sore Throat (swollen glands right side)    HPI:  26 yo female that presents to clinic today for right sided neck swelling x 3 days.    States that she has had similar swelling before that has usually resolved within a day or so.  States that her throat is not sore and that she has had no fever, cough or congestion.  Appetite and energy level are good.  States that the pain is a little worse whenever she is eating. States that she has been taking advil for pain and using warm compresses to which she states some relief.    Review of Systems   Constitutional: Negative for activity change, appetite change, fatigue and fever.   HENT: Negative for congestion, ear pain, postnasal drip, rhinorrhea, sinus pain, sinus pressure and trouble swallowing.         Right sided neck swelling.   Respiratory: Negative for apnea, cough, shortness of breath and wheezing.    Cardiovascular: Negative for chest pain and leg swelling.   Gastrointestinal: Negative for abdominal pain, constipation, diarrhea, nausea and vomiting.   Musculoskeletal: Positive for neck pain. Negative for arthralgias, myalgias and neck stiffness.   Skin: Negative for color change and rash.   Neurological: Negative for dizziness, light-headedness, numbness and headaches.   Psychiatric/Behavioral: Negative for behavioral problems.       Objective:      Physical Exam   Constitutional: She is oriented to person, place, and time. She appears well-developed and well-nourished. No distress.   HENT:   Head: Normocephalic and atraumatic.   Right Ear: External ear normal.   Left Ear: External ear normal.   Mouth/Throat: Oropharynx is clear and moist and mucous membranes are normal. No oral lesions. Normal dentition. No dental abscesses or dental caries. No oropharyngeal exudate, posterior oropharyngeal edema or posterior oropharyngeal erythema. No tonsillar exudate.   Neck: Trachea  normal and normal range of motion. Neck supple. No thyroid mass and no thyromegaly present.       There is swelling of the right submandibular gland.  It is slightly firm and tender to touch.   Cardiovascular: Normal rate, regular rhythm, normal heart sounds and intact distal pulses.    No murmur heard.  Pulmonary/Chest: Effort normal and breath sounds normal. No respiratory distress. She has no wheezes. She has no rales.   Abdominal: Soft. Bowel sounds are normal. She exhibits no distension and no mass. There is no tenderness.   Neurological: She is alert and oriented to person, place, and time. No sensory deficit.   Skin: Skin is warm and dry. No erythema.   Psychiatric: Her behavior is normal.       Assessment:       1. Submandibular gland swelling        Plan:       1. Submandibular gland swelling    -Patient vitals are stable.  -Encouraged use of hard, tart candies to help promote salivary flow.  -Continue to use warm compresses to area.  -Can continue to use advil or ibuprofen for swelling and pain.  -Encouraged to increase water intake.  -If conservative management does not cause resolution, will send to ENT for further evaluation and management.

## 2018-03-31 ENCOUNTER — OFFICE VISIT (OUTPATIENT)
Dept: URGENT CARE | Facility: CLINIC | Age: 28
End: 2018-03-31
Payer: COMMERCIAL

## 2018-03-31 VITALS
SYSTOLIC BLOOD PRESSURE: 116 MMHG | HEART RATE: 85 BPM | HEIGHT: 59 IN | WEIGHT: 144 LBS | RESPIRATION RATE: 16 BRPM | TEMPERATURE: 97 F | DIASTOLIC BLOOD PRESSURE: 80 MMHG | OXYGEN SATURATION: 99 % | BODY MASS INDEX: 29.03 KG/M2

## 2018-03-31 DIAGNOSIS — R60.0 SUBMANDIBULAR GLAND SWELLING: Primary | ICD-10-CM

## 2018-03-31 LAB
CTP QC/QA: YES
HETEROPH AB SER QL: NEGATIVE

## 2018-03-31 PROCEDURE — 86308 HETEROPHILE ANTIBODY SCREEN: CPT | Mod: QW,S$GLB,, | Performed by: NURSE PRACTITIONER

## 2018-03-31 PROCEDURE — 99214 OFFICE O/P EST MOD 30 MIN: CPT | Mod: 25,S$GLB,, | Performed by: NURSE PRACTITIONER

## 2018-03-31 PROCEDURE — 96372 THER/PROPH/DIAG INJ SC/IM: CPT | Mod: S$GLB,,, | Performed by: EMERGENCY MEDICINE

## 2018-03-31 RX ORDER — AMOXICILLIN AND CLAVULANATE POTASSIUM 875; 125 MG/1; MG/1
1 TABLET, FILM COATED ORAL 2 TIMES DAILY
Qty: 20 TABLET | Refills: 0 | Status: SHIPPED | OUTPATIENT
Start: 2018-03-31 | End: 2018-04-09 | Stop reason: ALTCHOICE

## 2018-03-31 RX ORDER — BETAMETHASONE SODIUM PHOSPHATE AND BETAMETHASONE ACETATE 3; 3 MG/ML; MG/ML
6 INJECTION, SUSPENSION INTRA-ARTICULAR; INTRALESIONAL; INTRAMUSCULAR; SOFT TISSUE
Status: COMPLETED | OUTPATIENT
Start: 2018-03-31 | End: 2018-03-31

## 2018-03-31 RX ADMIN — BETAMETHASONE SODIUM PHOSPHATE AND BETAMETHASONE ACETATE 6 MG: 3; 3 INJECTION, SUSPENSION INTRA-ARTICULAR; INTRALESIONAL; INTRAMUSCULAR; SOFT TISSUE at 12:03

## 2018-03-31 NOTE — PROGRESS NOTES
"Subjective:       Patient ID: Tammy Bonner is a 27 y.o. female.    Vitals:  height is 4' 11" (1.499 m) and weight is 65.3 kg (144 lb). Her temperature is 97.3 °F (36.3 °C). Her blood pressure is 116/80 and her pulse is 85. Her respiration is 16 and oxygen saturation is 99%.     Chief Complaint: No chief complaint on file.    Pt states her salivary gland on her right  side is swollen. Pt did see a NP on Wednesday at [facility], and was told to suck on some lemon drops to help the swelling      Sore Throat    This is a new problem. The current episode started in the past 7 days. The problem has been gradually worsening. The pain is worse on the right side. There has been no fever. The pain is at a severity of 5/10. The pain is moderate. Associated symptoms include swollen glands and trouble swallowing. Pertinent negatives include no abdominal pain, congestion, coughing, ear pain, headaches, hoarse voice or shortness of breath.     Review of Systems   Constitution: Negative for chills, fever and malaise/fatigue.   HENT: Positive for sore throat and trouble swallowing. Negative for congestion, ear pain and hoarse voice.    Eyes: Negative for discharge and redness.   Cardiovascular: Negative for chest pain, dyspnea on exertion and leg swelling.   Respiratory: Negative for cough, shortness of breath, sputum production and wheezing.    Musculoskeletal: Negative for myalgias.   Gastrointestinal: Negative for abdominal pain and nausea.   Neurological: Negative for headaches.       Objective:      Physical Exam   Constitutional: She is oriented to person, place, and time. She appears well-developed and well-nourished. She is cooperative.  Non-toxic appearance. She does not appear ill. No distress.   HENT:   Head: Normocephalic and atraumatic.       Right Ear: Hearing, tympanic membrane and ear canal normal.   Left Ear: Hearing, tympanic membrane and ear canal normal.   Nose: No mucosal edema, rhinorrhea or nasal " deformity. No epistaxis. Right sinus exhibits no maxillary sinus tenderness and no frontal sinus tenderness. Left sinus exhibits no maxillary sinus tenderness and no frontal sinus tenderness.   Mouth/Throat: Uvula is midline and mucous membranes are normal. No trismus in the jaw. Normal dentition. No uvula swelling. No posterior oropharyngeal erythema.   Eyes: Conjunctivae and lids are normal. Right eye exhibits no discharge. Left eye exhibits no discharge. No scleral icterus.   Sclera clear bilat   Neck: Trachea normal, full passive range of motion without pain and phonation normal. Neck supple.   Cardiovascular: Normal rate, regular rhythm and normal pulses.    Pulmonary/Chest: Effort normal. No respiratory distress.   Abdominal: Soft. Normal appearance. She exhibits no distension, no pulsatile midline mass and no mass. There is no tenderness.   Musculoskeletal: She exhibits no edema or deformity.   Lymphadenopathy:        Head (left side): Submandibular adenopathy present.   Neurological: She is alert and oriented to person, place, and time. She exhibits normal muscle tone. Coordination normal.   Skin: Skin is warm, dry and intact. She is not diaphoretic. No pallor.   Psychiatric: She has a normal mood and affect. Her speech is normal and behavior is normal. Judgment and thought content normal. Cognition and memory are normal.   Nursing note and vitals reviewed.      Assessment:       1. Submandibular gland swelling        Plan:       Patient Instructions     Salivary Gland Infection  Salivary glands make saliva. Saliva is mostly water. It also has minerals and proteins that help break down food and keep the mouth and teeth healthy. There are three pairs of salivary glands:  · Parotid glands (in front of the ear)  · Submandibular glands (below the jaw)  · Sublingual glands (below the tongue)  A salivary gland can become infected by bacteria (germs). Things that make this more likely include dehydration and taking  "medicines that affect saliva flow. Infection is also more likely when the duct (channel) that carries saliva from the gland to the mouth is blocked. It may be blocked by a salivary gland "stone." This is a collection of minerals that forms in the salivary gland.  Signs of infection include fever, severe pain in the gland, and redness and swelling over the gland. It may hurt to open the mouth. Symptoms may be worse when the flow of saliva is stimulated, such as by the smell of food.   Antibiotics are used to treat the infection. Drainage of the infection with a simple surgical procedure is sometimes needed. It a salivary gland stone is present, a procedure may be done to remove it.  Home Care:  · Take antibiotics as directed until they are finished. Do this even if you start to feel better after only a few days.  · Unless another medicine was prescribed, take over-the-counter medicines, such as acetaminophen or ibuprofen, to help relieve pain.  · Moist heat can also help relieve swelling and pain. Wet a cloth with warm water and put it over the affected gland for 10-15 minutes several times a day.  · Gently massage the gland a few times a day.   · Suck on lemon or other tart hard candies to encourage flow of saliva.  To help prevent blockages and infections:  · Drink 6-8 glasses of fluid per day (such as water, tea, and clear soup) to keep well hydrated.  · If you smoke, ask your healthcare provider for help to quit. Smoking makes salivary gland stones more likely.  · Maintain good dental hygiene. Brush and floss your teeth daily. See your dentist for regular cleanings.  Follow-up care  Follow up with your healthcare provider or as advised.   When to seek medical advice  Call your healthcare provider if any of the following occur:  · Fever over 100.4°F (38ºC) after two days of taking antibiotics  · Symptoms get worse or don't improve within a few days  · Trouble breathing or swallowing  Date Last Reviewed: " 5/4/2015 © 2000-2017 [employer]. [address] [place] [address], [place] [address]. All rights reserved. This information is not intended as a substitute for professional medical care. Always follow your healthcare professional's instructions.            Submandibular gland swelling  -     POCT Infectious mononucleosis antibody    Other orders  -     betamethasone acetate-betamethasone sodium phosphate injection 6 mg; Inject 1 mL (6 mg total) into the muscle one time.  -     amoxicillin-clavulanate 875-125mg (AUGMENTIN) 875-125 mg per tablet; Take 1 tablet by mouth 2 (two) times daily.  Dispense: 20 tablet; Refill: 0

## 2018-03-31 NOTE — PATIENT INSTRUCTIONS
"  Salivary Gland Infection  Salivary glands make saliva. Saliva is mostly water. It also has minerals and proteins that help break down food and keep the mouth and teeth healthy. There are three pairs of salivary glands:  · Parotid glands (in front of the ear)  · Submandibular glands (below the jaw)  · Sublingual glands (below the tongue)  A salivary gland can become infected by bacteria (germs). Things that make this more likely include dehydration and taking medicines that affect saliva flow. Infection is also more likely when the duct (channel) that carries saliva from the gland to the mouth is blocked. It may be blocked by a salivary gland "stone." This is a collection of minerals that forms in the salivary gland.  Signs of infection include fever, severe pain in the gland, and redness and swelling over the gland. It may hurt to open the mouth. Symptoms may be worse when the flow of saliva is stimulated, such as by the smell of food.   Antibiotics are used to treat the infection. Drainage of the infection with a simple surgical procedure is sometimes needed. It a salivary gland stone is present, a procedure may be done to remove it.  Home Care:  · Take antibiotics as directed until they are finished. Do this even if you start to feel better after only a few days.  · Unless another medicine was prescribed, take over-the-counter medicines, such as acetaminophen or ibuprofen, to help relieve pain.  · Moist heat can also help relieve swelling and pain. Wet a cloth with warm water and put it over the affected gland for 10-15 minutes several times a day.  · Gently massage the gland a few times a day.   · Suck on lemon or other tart hard candies to encourage flow of saliva.  To help prevent blockages and infections:  · Drink 6-8 glasses of fluid per day (such as water, tea, and clear soup) to keep well hydrated.  · If you smoke, ask your healthcare provider for help to quit. Smoking makes salivary gland stones more " likely.  · Maintain good dental hygiene. Brush and floss your teeth daily. See your dentist for regular cleanings.  Follow-up care  Follow up with your healthcare provider or as advised.   When to seek medical advice  Call your healthcare provider if any of the following occur:  · Fever over 100.4°F (38ºC) after two days of taking antibiotics  · Symptoms get worse or don't improve within a few days  · Trouble breathing or swallowing  Date Last Reviewed: 5/4/2015  © 7571-2730 [employer]. [address] [place] [address], [place] [address]. All rights reserved. This information is not intended as a substitute for professional medical care. Always follow your healthcare professional's instructions.

## 2018-04-04 ENCOUNTER — PATIENT MESSAGE (OUTPATIENT)
Dept: INTERNAL MEDICINE | Facility: CLINIC | Age: 28
End: 2018-04-04

## 2018-04-05 ENCOUNTER — TELEPHONE (OUTPATIENT)
Dept: INTERNAL MEDICINE | Facility: CLINIC | Age: 28
End: 2018-04-05

## 2018-04-05 DIAGNOSIS — R60.0 SUBMANDIBULAR GLAND SWELLING: Primary | ICD-10-CM

## 2018-04-05 NOTE — TELEPHONE ENCOUNTER
Called spoke with pt  She has had some improvement  But continued pain swelling  intermittant as well  She declined appt today  But will consider If no resolution  She would like ent referral   Which was placed as requested

## 2018-04-08 ENCOUNTER — PATIENT MESSAGE (OUTPATIENT)
Dept: INTERNAL MEDICINE | Facility: CLINIC | Age: 28
End: 2018-04-08

## 2018-04-09 ENCOUNTER — LAB VISIT (OUTPATIENT)
Dept: LAB | Facility: HOSPITAL | Age: 28
End: 2018-04-09
Attending: INTERNAL MEDICINE
Payer: COMMERCIAL

## 2018-04-09 ENCOUNTER — OFFICE VISIT (OUTPATIENT)
Dept: INTERNAL MEDICINE | Facility: CLINIC | Age: 28
End: 2018-04-09
Payer: COMMERCIAL

## 2018-04-09 VITALS
BODY MASS INDEX: 28.26 KG/M2 | SYSTOLIC BLOOD PRESSURE: 120 MMHG | DIASTOLIC BLOOD PRESSURE: 74 MMHG | TEMPERATURE: 99 F | HEART RATE: 78 BPM | HEIGHT: 59 IN | WEIGHT: 140.19 LBS

## 2018-04-09 DIAGNOSIS — R22.1 NECK MASS: ICD-10-CM

## 2018-04-09 DIAGNOSIS — R22.1 NECK MASS: Primary | ICD-10-CM

## 2018-04-09 DIAGNOSIS — R22.0 SWELLING OF SUBMANDIBULAR REGION: ICD-10-CM

## 2018-04-09 DIAGNOSIS — R22.1 SWELLING OF SUBMANDIBULAR REGION: ICD-10-CM

## 2018-04-09 LAB
ALBUMIN SERPL BCP-MCNC: 3.5 G/DL
ALP SERPL-CCNC: 89 U/L
ALT SERPL W/O P-5'-P-CCNC: 13 U/L
ANION GAP SERPL CALC-SCNC: 9 MMOL/L
AST SERPL-CCNC: 17 U/L
BASOPHILS # BLD AUTO: 0.03 K/UL
BASOPHILS NFR BLD: 0.2 %
BILIRUB DIRECT SERPL-MCNC: 0.1 MG/DL
BILIRUB SERPL-MCNC: 0.3 MG/DL
BUN SERPL-MCNC: 13 MG/DL
CALCIUM SERPL-MCNC: 9.5 MG/DL
CHLORIDE SERPL-SCNC: 101 MMOL/L
CO2 SERPL-SCNC: 28 MMOL/L
CREAT SERPL-MCNC: 0.8 MG/DL
DIFFERENTIAL METHOD: ABNORMAL
EOSINOPHIL # BLD AUTO: 0.3 K/UL
EOSINOPHIL NFR BLD: 2.5 %
ERYTHROCYTE [DISTWIDTH] IN BLOOD BY AUTOMATED COUNT: 12.3 %
EST. GFR  (AFRICAN AMERICAN): >60 ML/MIN/1.73 M^2
EST. GFR  (NON AFRICAN AMERICAN): >60 ML/MIN/1.73 M^2
GLUCOSE SERPL-MCNC: 102 MG/DL
HCT VFR BLD AUTO: 37.5 %
HGB BLD-MCNC: 12.7 G/DL
LYMPHOCYTES # BLD AUTO: 2.2 K/UL
LYMPHOCYTES NFR BLD: 18.4 %
MCH RBC QN AUTO: 29 PG
MCHC RBC AUTO-ENTMCNC: 33.9 G/DL
MCV RBC AUTO: 86 FL
MONOCYTES # BLD AUTO: 0.9 K/UL
MONOCYTES NFR BLD: 7.5 %
NEUTROPHILS # BLD AUTO: 8.7 K/UL
NEUTROPHILS NFR BLD: 71.2 %
PLATELET # BLD AUTO: 373 K/UL
PMV BLD AUTO: 8.9 FL
POTASSIUM SERPL-SCNC: 3.9 MMOL/L
PROT SERPL-MCNC: 7.8 G/DL
RBC # BLD AUTO: 4.38 M/UL
SODIUM SERPL-SCNC: 138 MMOL/L
WBC # BLD AUTO: 12.2 K/UL

## 2018-04-09 PROCEDURE — 80048 BASIC METABOLIC PNL TOTAL CA: CPT

## 2018-04-09 PROCEDURE — 99214 OFFICE O/P EST MOD 30 MIN: CPT | Mod: S$GLB,,, | Performed by: INTERNAL MEDICINE

## 2018-04-09 PROCEDURE — 85025 COMPLETE CBC W/AUTO DIFF WBC: CPT

## 2018-04-09 PROCEDURE — 86735 MUMPS ANTIBODY: CPT | Mod: 91

## 2018-04-09 PROCEDURE — 80076 HEPATIC FUNCTION PANEL: CPT

## 2018-04-09 PROCEDURE — 99999 PR PBB SHADOW E&M-EST. PATIENT-LVL III: CPT | Mod: PBBFAC,,, | Performed by: INTERNAL MEDICINE

## 2018-04-09 RX ORDER — DOXYCYCLINE HYCLATE 100 MG
100 TABLET ORAL EVERY 12 HOURS
Qty: 20 TABLET | Refills: 0 | Status: SHIPPED | OUTPATIENT
Start: 2018-04-09 | End: 2018-04-19

## 2018-04-09 NOTE — PROGRESS NOTES
"Subjective:       Patient ID: Tammy Bonner is a 27 y.o. female.    Chief Complaint: Oral Pain  27 year old presents for follow up. She has been having pain/swelling right submandibular area for the last   Few weeks. She went to urgent care and was placed on augmentin . She has not noticed much imrpovment and has had continued swelling  Now some firmness to the area which is new feels harder to the touch. She has not been eating well due to difficulty swallowing not so much in the throat as in the area of her neck when she opens and closes her mouth She is brought in by her father today. She denies current dental issues  Has not been eating well due to discomfort with opening and closing her mouth . He weight has come down relates to eating less.     HPI  Review of Systems   Constitutional: Positive for fatigue.        Appetite down   Painful to swallow   Due to opening and closing   Aggravates neck    HENT: Negative for congestion and postnasal drip.         Swollen gland right neck  Painful now firm and hard        Objective:     Blood pressure 120/74, pulse 78, temperature 99 °F (37.2 °C), height 4' 11" (1.499 m), weight 63.6 kg (140 lb 3.4 oz), last menstrual period 03/28/2018.    Physical Exam   Constitutional: No distress.   HENT:   Head: Normocephalic.   Mouth/Throat: Oropharynx is clear and moist.   Swollen painful large firm  Mass right neck submandibular region    Oropharynx clear      Eyes: No scleral icterus.   Neck: Neck supple.   Cardiovascular: Normal rate, regular rhythm and normal heart sounds.  Exam reveals no gallop and no friction rub.    No murmur heard.  Pulmonary/Chest: Effort normal and breath sounds normal. No respiratory distress.   Abdominal: Soft. Bowel sounds are normal. She exhibits no mass. There is no tenderness.   Musculoskeletal: She exhibits no edema.   Neurological: She is alert.   Skin: No erythema.   Psychiatric: She has a normal mood and affect.   Vitals reviewed.    "   Assessment:       1. Swelling of submandibular region    2. Neck mass        Plan:       Tammy was seen today for oral pain.    Diagnoses and all orders for this visit:    Swelling of submandibular region  Neck mass  persitatnat   -     Basic metabolic panel; Future  -     CBC auto differential; Future  -     Hepatic function panel; Future  -     MUMPS VIRUS ANTIBODIES, IGG AND IGM; Future  -     CT Soft Tissue Neck WO Contrast; Future    Discussed with pt with fever and increased size and discomfort  Plan switch to doxycycline    probiotics discussed for precuations   -     doxycycline (VIBRA-TABS) 100 MG tablet; Take 1 tablet (100 mg total) by mouth every 12 (twelve) hours. Taking with food and water    Advised rest fluids hydration soft foods as tolerated  She can continue anti inflammatory and warm compress for discomfort     She has ent appt she will keep as planned

## 2018-04-11 ENCOUNTER — PATIENT MESSAGE (OUTPATIENT)
Dept: INTERNAL MEDICINE | Facility: CLINIC | Age: 28
End: 2018-04-11

## 2018-04-11 ENCOUNTER — HOSPITAL ENCOUNTER (OUTPATIENT)
Dept: RADIOLOGY | Facility: HOSPITAL | Age: 28
Discharge: HOME | End: 2018-04-11
Attending: INTERNAL MEDICINE
Payer: COMMERCIAL

## 2018-04-11 DIAGNOSIS — R22.1 NECK MASS: ICD-10-CM

## 2018-04-11 DIAGNOSIS — R22.1 SWELLING OF SUBMANDIBULAR REGION: ICD-10-CM

## 2018-04-11 DIAGNOSIS — R22.0 SWELLING OF SUBMANDIBULAR REGION: ICD-10-CM

## 2018-04-11 LAB
MUV IGG SER IA-ACNC: 4.5
MUV IGG SER QL IA: POSITIVE
MUV IGM SER IA-ACNC: 0.08 (ref 0–0.79)
MUV IGM SER QL IA: NEGATIVE

## 2018-04-11 PROCEDURE — 70491 CT SOFT TISSUE NECK W/DYE: CPT | Mod: 26,,, | Performed by: RADIOLOGY

## 2018-04-11 PROCEDURE — 70491 CT SOFT TISSUE NECK W/DYE: CPT | Mod: TC

## 2018-04-11 PROCEDURE — 70490 CT SOFT TISSUE NECK W/O DYE: CPT | Mod: TC

## 2018-04-11 PROCEDURE — 25500020 PHARM REV CODE 255: Performed by: INTERNAL MEDICINE

## 2018-04-11 RX ADMIN — IOHEXOL 75 ML: 350 INJECTION, SOLUTION INTRAVENOUS at 06:04

## 2018-04-12 NOTE — TELEPHONE ENCOUNTER
Spoke with pt. States will try eating a little more before antibiotic, she stated that was the first time it happened

## 2018-04-13 ENCOUNTER — TELEPHONE (OUTPATIENT)
Dept: OTOLARYNGOLOGY | Facility: CLINIC | Age: 28
End: 2018-04-13

## 2018-04-13 ENCOUNTER — TELEPHONE (OUTPATIENT)
Dept: INTERNAL MEDICINE | Facility: CLINIC | Age: 28
End: 2018-04-13

## 2018-04-13 NOTE — TELEPHONE ENCOUNTER
----- Message from Dao Low sent at 4/13/2018 10:56 AM CDT -----  Please contact Veronique in Dr [place]'s [place] [phone] regarding pushing up pt's appt date/time as soon as possible

## 2018-04-13 NOTE — TELEPHONE ENCOUNTER
Called and spoke with veronique with Dr Macias office whom stated that patient have an appointment for 4/19 but needs something sooner patient is not eating or being able to swallow and glands are really swollen. Assisted veronique to making patient a sooner appointment on 4/17 at 845 am. Veronique verbalized understanding

## 2018-04-17 ENCOUNTER — OFFICE VISIT (OUTPATIENT)
Dept: OTOLARYNGOLOGY | Facility: CLINIC | Age: 28
End: 2018-04-17
Payer: COMMERCIAL

## 2018-04-17 VITALS
BODY MASS INDEX: 28.45 KG/M2 | TEMPERATURE: 98 F | SYSTOLIC BLOOD PRESSURE: 125 MMHG | DIASTOLIC BLOOD PRESSURE: 72 MMHG | WEIGHT: 140.88 LBS | HEART RATE: 92 BPM

## 2018-04-17 DIAGNOSIS — K11.5 SUBMANDIBULAR SIALOLITHIASIS: Primary | ICD-10-CM

## 2018-04-17 PROCEDURE — 99999 PR PBB SHADOW E&M-EST. PATIENT-LVL III: CPT | Mod: PBBFAC,,, | Performed by: OTOLARYNGOLOGY

## 2018-04-17 PROCEDURE — 99204 OFFICE O/P NEW MOD 45 MIN: CPT | Mod: S$GLB,,, | Performed by: OTOLARYNGOLOGY

## 2018-04-17 NOTE — PROGRESS NOTES
Chief Complaint   Patient presents with    consult/ swollen glands or salivary glands       HPI   27 y.o. female presents from Dr. Macias for evaluation of R submandibular sialolithiasis.  She reports that her R SMG became enlarged and painful roughly 1 month ago.  She has been treated with Augmentin and doxycycline with some improvement in her symptoms.  She reports persistent pain in the R SMG which is limiting her mouth opening and her ability to eat.  She has never experienced similar problems.     Review of Systems   Constitutional: Negative for fatigue and unexpected weight change.   HENT: Per HPI.  Eyes: Negative for visual disturbance.   Respiratory: Negative for shortness of breath, hemoptysis   Cardiovascular: Negative for chest pain and palpitations.   Musculoskeletal: Negative for decreased ROM, back pain.   Skin: Negative for rash, sunburn, itching.   Neurological: Negative for dizziness and seizures.   Hematological: Negative for adenopathy. Does not bruise/bleed easily.   Endocrine: Negative for rapid weight loss/weight gain, heat/cold intolerance.     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  Social History     Social History    Marital status: Single     Spouse name: N/A    Number of children: N/A    Years of education: N/A     Occupational History    Not on file.      Social History Main Topics    Smoking status: Never Smoker    Smokeless tobacco: Never Used    Alcohol use No    Drug use: No    Sexual activity: Yes     Partners: Female     Birth control/ protection: OCP     Other Topics Concern    Not on file     Social History Narrative    No narrative on file         Allergies   Review of patient's allergies indicates:   Allergen Reactions    No known drug allergies            Physical Exam     Vitals:    04/17/18 0840   BP: 125/72   Pulse: 92   Temp: 97.8 °F (36.6 °C)         Body mass index is 28.45 kg/m².      General: AOx3, NAD   Respiratory:  Symmetric chest rise, normal effort  Oral Cavity:  Oral Tongue mobile, no lesions noted. Hard Palate WNL. No buccal or FOM lesions.  Stone palpable in posterior R FOM.   Oropharynx:  No masses/lesions of the posterior pharyngeal wall. Tonsillar fossa without lesions. Soft palate without masses. Midline uvula.   Neck: No scars.  No cervical lymphadenopathy, thyromegaly or thyroid nodules.  Normal range of motion.  R SMG enlarged, firm.   Face: House Brackmann I bilaterally.     CT neck reviewed    Assessment/Plan  Problem List Items Addressed This Visit        ENT    Submandibular sialolithiasis - Primary     R SMG sialolithiasis.  I explained that she has two options:  SMG resection vs transoral sialolithotomy.  Given the size and proximal location of the stone, I recommended SMG resection.    We discussed the risks, benefits, and indications to right submandibular gland resection. The risks were noted to include, but not be limited to, infection, bleeding, scarring, collection of blood or tissue fluid requiring drainage, weakness of the lip which may be temporary or permanent, weakness/numbness of the tongue which could be temporary or permanent and cause speech and/or swallowing difficulty, wand the need for additional procedures. Time was allowed for questions, and all questions were answered to the patient's apparent  satisfaction.     She will call to schedule surgery.

## 2018-04-17 NOTE — ASSESSMENT & PLAN NOTE
R SMG sialolithiasis.  I explained that she has two options:  SMG resection vs transoral sialolithotomy.  Given the size and proximal location of the stone, I recommended SMG resection.    We discussed the risks, benefits, and indications to right submandibular gland resection. The risks were noted to include, but not be limited to, infection, bleeding, scarring, collection of blood or tissue fluid requiring drainage, weakness of the lip which may be temporary or permanent, weakness/numbness of the tongue which could be temporary or permanent and cause speech and/or swallowing difficulty, wand the need for additional procedures. Time was allowed for questions, and all questions were answered to the patient's apparent satisfaction.     She will call to schedule surgery.

## 2018-04-17 NOTE — LETTER
April 17, 2018      Radha Macias MD  [address]  [facility] [address]           [facility]  [address]  [facility] [address]  Phone: [phone]  Fax: [phone]          Patient: Tammy Bonner   MR Number: [id number]   YOB: 1990   Date of Visit: 4/17/2018       Dear Dr. Radha Macias:    Thank you for referring Tammy Bonner to me for evaluation. Attached you will find relevant portions of my assessment and plan of care.    If you have questions, please do not hesitate to call me. I look forward to following Tammy Bonner along with you.    Sincerely,    Lukasz Arndt MD    Enclosure  CC:  No Recipients    If you would like to receive this communication electronically, please contact [email or web address] or [phone] to request more information on [employer] Link access.    For providers and/or their staff who would like to refer a patient to [facility], please contact us through our one-stop-shop provider referral line, [facility], at [phone].    If you feel you have received this communication in error or would no longer like to receive these types of communications, please e-mail [email or web address]

## 2018-04-18 ENCOUNTER — PATIENT MESSAGE (OUTPATIENT)
Dept: OTOLARYNGOLOGY | Facility: CLINIC | Age: 28
End: 2018-04-18

## 2018-04-18 ENCOUNTER — TELEPHONE (OUTPATIENT)
Dept: INTERNAL MEDICINE | Facility: CLINIC | Age: 28
End: 2018-04-18

## 2018-04-24 ENCOUNTER — PATIENT MESSAGE (OUTPATIENT)
Dept: INTERNAL MEDICINE | Facility: CLINIC | Age: 28
End: 2018-04-24

## 2018-04-25 DIAGNOSIS — K11.5 SUBMANDIBULAR SIALOLITHIASIS: Primary | ICD-10-CM

## 2018-04-25 RX ORDER — LIDOCAINE HYDROCHLORIDE 10 MG/ML
1 INJECTION, SOLUTION EPIDURAL; INFILTRATION; INTRACAUDAL; PERINEURAL ONCE
Status: CANCELLED | OUTPATIENT
Start: 2018-04-25 | End: 2018-04-25

## 2018-05-01 ENCOUNTER — TELEPHONE (OUTPATIENT)
Dept: OTOLARYNGOLOGY | Facility: CLINIC | Age: 28
End: 2018-05-01

## 2018-05-01 ENCOUNTER — OFFICE VISIT (OUTPATIENT)
Dept: INTERNAL MEDICINE | Facility: CLINIC | Age: 28
End: 2018-05-01
Payer: COMMERCIAL

## 2018-05-01 VITALS
BODY MASS INDEX: 28.45 KG/M2 | HEIGHT: 59 IN | DIASTOLIC BLOOD PRESSURE: 64 MMHG | HEART RATE: 97 BPM | WEIGHT: 141.13 LBS | SYSTOLIC BLOOD PRESSURE: 112 MMHG

## 2018-05-01 DIAGNOSIS — K11.5 SUBMANDIBULAR SIALOLITHIASIS: Primary | ICD-10-CM

## 2018-05-01 PROCEDURE — 99213 OFFICE O/P EST LOW 20 MIN: CPT | Mod: S$GLB,,, | Performed by: INTERNAL MEDICINE

## 2018-05-01 PROCEDURE — 99999 PR PBB SHADOW E&M-EST. PATIENT-LVL III: CPT | Mod: PBBFAC,,, | Performed by: INTERNAL MEDICINE

## 2018-05-01 NOTE — PROGRESS NOTES
Answers for HPI/ROS submitted by the patient on 4/29/2018   activity change: No  unexpected weight change: No  neck pain: No  hearing loss: No  rhinorrhea: No  trouble swallowing: No  eye discharge: No  visual disturbance: No  chest tightness: No  wheezing: No  chest pain: No  palpitations: No  blood in stool: No  constipation: No  vomiting: No  diarrhea: No  polydipsia: No  polyuria: No  difficulty urinating: No  hematuria: No  menstrual problem: No  dysuria: No  joint swelling: No  arthralgias: No  headaches: No  weakness: No  confusion: No  dysphoric mood: No

## 2018-05-01 NOTE — TELEPHONE ENCOUNTER
----- Message from Lizbet Ng sent at 5/1/2018  3:01 PM CDT -----  Contact: patient  Please call above patient at work number missed a call from the nurse

## 2018-05-01 NOTE — TELEPHONE ENCOUNTER
Called and spoke with patient whom stated that she wanted to find out the fax number for office which was given to her because she stated that dr nieto needs to fill out some paperwork for her job concerning her surgery.

## 2018-05-01 NOTE — TELEPHONE ENCOUNTER
----- Message from Lizbet Ng sent at 5/1/2018  1:52 PM CDT -----  Contact: patient  Please call above patient has questions about her surgery

## 2018-05-01 NOTE — PROGRESS NOTES
"Subjective:       Patient ID: Tammy Bonner is a 27 y.o. female.    Chief Complaint: Follow-up   this is a 27-year-old who presents today for follow-up.  She's been having difficulty with her neck with swollen painful glands in the right side.  With the last course of antibiotics doxycycline she did seem to have some improvement in the pain and swelling after that course.  She has been able to eat a bit more but she continues to have a large firm gland that area she has been to see ENT and has scheduled her surgery which she is planning to do in the next few weeks she has been eating trying to stay hydrated in the meantime     HPI  Review of Systems   Constitutional: Negative for activity change, fever and unexpected weight change.        Eating a bit better    HENT: Negative for hearing loss, rhinorrhea and trouble swallowing.         Gland swelling right but improved a bit     Eyes: Negative for discharge and visual disturbance.   Respiratory: Negative for chest tightness and wheezing.    Cardiovascular: Negative for chest pain and palpitations.   Gastrointestinal: Negative for blood in stool, constipation, diarrhea and vomiting.   Endocrine: Negative for polydipsia and polyuria.   Genitourinary: Negative for difficulty urinating, dysuria, hematuria and menstrual problem.   Musculoskeletal: Negative for arthralgias, joint swelling and neck pain.   Neurological: Negative for weakness and headaches.   Psychiatric/Behavioral: Negative for confusion and dysphoric mood.       Objective:     Blood pressure 112/64, pulse 97, height 4' 11" (1.499 m), weight 64 kg (141 lb 1.5 oz).    Physical Exam   Constitutional: No distress.   HENT:   Head: Normocephalic.   Mouth/Throat: Oropharynx is clear and moist.   Right submandibular gland swollen  Firm less painful    Eyes: No scleral icterus.   Neck: Neck supple.   Cardiovascular: Normal rate, regular rhythm and normal heart sounds.  Exam reveals no gallop and no friction " rub.    No murmur heard.  Pulmonary/Chest: Effort normal and breath sounds normal. No respiratory distress.   Abdominal: Soft. Bowel sounds are normal. She exhibits no mass. There is no tenderness.   Musculoskeletal: She exhibits no edema.   Neurological: She is alert.   Skin: No erythema.   Vitals reviewed.      Assessment:       1. Submandibular sialolithiasis        Plan:       Tammy was seen today for follow-up.    Diagnoses and all orders for this visit:    Submandibular sialolithiasis  Patient has completed her course of antibiotics with some improvement in the pain and redness her gland remains swollen and firm and she is planning upcoming surgery discussed with her she can hold aspirin anti-inflammatories 5 days prior    Patient clear to proceed with ENT surgery and anesthesia as planned  Recent labs reviewed     She will return for her annual physical after

## 2018-05-02 ENCOUNTER — PATIENT MESSAGE (OUTPATIENT)
Dept: SURGERY | Facility: HOSPITAL | Age: 28
End: 2018-05-02

## 2018-05-04 ENCOUNTER — ANESTHESIA EVENT (OUTPATIENT)
Dept: SURGERY | Facility: HOSPITAL | Age: 28
End: 2018-05-04
Payer: COMMERCIAL

## 2018-05-04 RX ORDER — MULTIVITAMIN
1 TABLET ORAL DAILY
COMMUNITY
End: 2023-05-19

## 2018-05-04 NOTE — ANESTHESIA PREPROCEDURE EVALUATION
Pre Admission Screening  Ashley Rios RN      []Hide copied text  Anesthesia Assessment: Preoperative EQUATION     Planned Procedure: Procedure(s) (LRB):  EXCISION-GLAND-SUBMANDIBULAR (Right)  Requested Anesthesia Type:General  Surgeon: Lukasz Arndt MD  Service: ENT  Known or anticipated Date of Surgery:5/16/2018     Surgeon notes: reviewed     Electronic QUestionnaire Assessment completed via nurse interview with patient.         NO AQ     Triage considerations:      The patient has no apparent active cardiac condition (No unstable coronary Syndrome such as severe unstable angina or recent [<1 month] myocardial infarction, decompensated CHF, severe valvular   disease or significant arrhythmia)     Previous anesthesia records:GETA, MAC and No problems 7/2014 breast  Biopsy Airway/Jaw/Neck:  Airway Findings: Mouth Opening: Normal Tongue: Normal  General Airway Assessment: Adult  Mallampati: I  TM Distance: Normal, at least 6 cm  Jaw/Neck Findings:  Neck ROM: Normal ROM  Neck Findings:        Last PCP note: within 1 month , within [facility]   Subspecialty notes: ENT     Other important co-morbidities: none     Tests already available:  Available tests,  within 3 months . 4/9/18 hep fxn, CBC, BMP.                            Instructions given. (See in Nurse's note)     Optimization:  Anesthesia Preop Clinic Assessment  Indicated-not required for this procedure                Plan:    Testing:  none                           Patient  has previously scheduled Medical Appointment: none     Navigation:                          Straight Line to surgery.                          No tests, anesthesia preop clinic visit, or consult required.                                                     Electronically signed by Ashley Rios RN at 5/4/2018  2:47 PM        Pre-admit on 5/16/2018            Detailed Report                                                                                                                     05/04/2018  Tammy Bonner is a 27 y.o., female.    Anesthesia Evaluation         Review of Systems  Anesthesia Hx:  No problems with previous Anesthesia History of prior surgery of interest to airway management or planning: Previous anesthesia: MAC  breast biopsy 2014 with MAC.  Procedure performed at an [facility] Facility. Denies Family Hx of Anesthesia complications.   Denies Personal Hx of Anesthesia complications.   Hematology/Oncology:  Hematology Normal   Oncology Normal     EENT/Dental:   Submandibular sialolithiasis Eyes: Eye Disease: Glaucoma:     Cardiovascular:  Cardiovascular Normal Exercise tolerance: good   Denies Angina.    Pulmonary:  Pulmonary Normal  Denies Shortness of breath.  Denies Recent URI.    Renal/:  Renal/ Normal     Hepatic/GI:  Hepatic/GI Normal    Musculoskeletal:  Musculoskeletal Normal    Neurological:  Neurology Normal    Endocrine:  Endocrine Normal    Psych:   depression HX Aspergers         Physical Exam  General:  Well nourished    Airway/Jaw/Neck:  Airway Findings: Mouth Opening: Normal Tongue: Normal  General Airway Assessment: Adult  Mallampati: II  TM Distance: Normal, at least 6 cm      Dental:  Dental Findings: In tact        Mental Status:  Mental Status Findings:  Cooperative, Alert and Oriented         Anesthesia Plan  Type of Anesthesia, risks & benefits discussed:  Anesthesia Type:  general  Patient's Preference:   Intra-op Monitoring Plan: standard ASA monitors  Intra-op Monitoring Plan Comments:   Post Op Pain Control Plan:   Post Op Pain Control Plan Comments:   Induction:   IV  Beta Blocker:  Patient is not currently on a Beta-Blocker (No further documentation required).       Informed Consent: Patient understands risks and agrees with Anesthesia plan.  Questions answered. Anesthesia consent signed with patient.  ASA Score: 1     Day of Surgery Review of History & Physical:    H&P update referred to the surgeon.         Ready For Surgery From  Anesthesia Perspective.

## 2018-05-04 NOTE — PRE ADMISSION SCREENING
Anesthesia Assessment: Preoperative EQUATION    Planned Procedure: Procedure(s) (LRB):  EXCISION-GLAND-SUBMANDIBULAR (Right)  Requested Anesthesia Type:General  Surgeon: Lukasz Arndt MD  Service: ENT  Known or anticipated Date of Surgery:5/16/2018    Surgeon notes: reviewed    Electronic QUestionnaire Assessment completed via nurse interview with patient.        NO AQ    Triage considerations:     The patient has no apparent active cardiac condition (No unstable coronary Syndrome such as severe unstable angina or recent [<1 month] myocardial infarction, decompensated CHF, severe valvular   disease or significant arrhythmia)    Previous anesthesia records:GETA, MAC and No problems 7/2014 breast  Biopsy Airway/Jaw/Neck:  Airway Findings: Mouth Opening: Normal Tongue: Normal  General Airway Assessment: Adult  Mallampati: I  TM Distance: Normal, at least 6 cm  Jaw/Neck Findings:  Neck ROM: Normal ROM  Neck Findings:       Last PCP note: within 1 month , within [facility]   Subspecialty notes: ENT    Other important co-morbidities: none     Tests already available:  Available tests,  within 3 months . 4/9/18 hep fxn, CBC, BMP.            Instructions given. (See in Nurse's note)    Optimization:  Anesthesia Preop Clinic Assessment  Indicated-not required for this procedure      Plan:    Testing:  none     Patient  has previously scheduled Medical Appointment: none    Navigation:                Straight Line to surgery.               No tests, anesthesia preop clinic visit, or consult required.

## 2018-05-10 ENCOUNTER — TELEPHONE (OUTPATIENT)
Dept: OTOLARYNGOLOGY | Facility: CLINIC | Age: 28
End: 2018-05-10

## 2018-05-11 ENCOUNTER — PATIENT MESSAGE (OUTPATIENT)
Dept: SURGERY | Facility: HOSPITAL | Age: 28
End: 2018-05-11

## 2018-05-15 ENCOUNTER — TELEPHONE (OUTPATIENT)
Dept: OTOLARYNGOLOGY | Facility: CLINIC | Age: 28
End: 2018-05-15

## 2018-05-16 ENCOUNTER — HOSPITAL ENCOUNTER (OUTPATIENT)
Facility: HOSPITAL | Age: 28
Discharge: HOME | End: 2018-05-17
Attending: OTOLARYNGOLOGY | Admitting: OTOLARYNGOLOGY
Payer: COMMERCIAL

## 2018-05-16 ENCOUNTER — SURGERY (OUTPATIENT)
Age: 28
End: 2018-05-16

## 2018-05-16 ENCOUNTER — ANESTHESIA (OUTPATIENT)
Dept: SURGERY | Facility: HOSPITAL | Age: 28
End: 2018-05-16
Payer: COMMERCIAL

## 2018-05-16 DIAGNOSIS — K11.5 SUBMANDIBULAR SIALOLITHIASIS: Primary | ICD-10-CM

## 2018-05-16 LAB
B-HCG UR QL: NEGATIVE
CTP QC/QA: YES

## 2018-05-16 PROCEDURE — D9220A PRA ANESTHESIA: Mod: ANES,,, | Performed by: ANESTHESIOLOGY

## 2018-05-16 PROCEDURE — 37000009 HC ANESTHESIA EA ADD 15 MINS: Performed by: OTOLARYNGOLOGY

## 2018-05-16 PROCEDURE — 63600175 PHARM REV CODE 636 W HCPCS: Performed by: OTOLARYNGOLOGY

## 2018-05-16 PROCEDURE — 25000003 PHARM REV CODE 250: Performed by: NURSE ANESTHETIST, CERTIFIED REGISTERED

## 2018-05-16 PROCEDURE — D9220A PRA ANESTHESIA: Mod: CRNA,,, | Performed by: NURSE ANESTHETIST, CERTIFIED REGISTERED

## 2018-05-16 PROCEDURE — 42440 EXCISE SUBMAXILLARY GLAND: CPT | Mod: RT,,, | Performed by: OTOLARYNGOLOGY

## 2018-05-16 PROCEDURE — 36000706: Performed by: OTOLARYNGOLOGY

## 2018-05-16 PROCEDURE — 94761 N-INVAS EAR/PLS OXIMETRY MLT: CPT

## 2018-05-16 PROCEDURE — 81025 URINE PREGNANCY TEST: CPT | Performed by: OTOLARYNGOLOGY

## 2018-05-16 PROCEDURE — C1729 CATH, DRAINAGE: HCPCS | Performed by: OTOLARYNGOLOGY

## 2018-05-16 PROCEDURE — 36000707: Performed by: OTOLARYNGOLOGY

## 2018-05-16 PROCEDURE — 71000033 HC RECOVERY, INTIAL HOUR: Performed by: OTOLARYNGOLOGY

## 2018-05-16 PROCEDURE — 88307 TISSUE EXAM BY PATHOLOGIST: CPT | Performed by: PATHOLOGY

## 2018-05-16 PROCEDURE — 63600175 PHARM REV CODE 636 W HCPCS: Performed by: NURSE ANESTHETIST, CERTIFIED REGISTERED

## 2018-05-16 PROCEDURE — 25000003 PHARM REV CODE 250: Performed by: OTOLARYNGOLOGY

## 2018-05-16 PROCEDURE — 71000039 HC RECOVERY, EACH ADD'L HOUR: Performed by: OTOLARYNGOLOGY

## 2018-05-16 PROCEDURE — 37000008 HC ANESTHESIA 1ST 15 MINUTES: Performed by: OTOLARYNGOLOGY

## 2018-05-16 RX ORDER — MIDAZOLAM HYDROCHLORIDE 1 MG/ML
INJECTION, SOLUTION INTRAMUSCULAR; INTRAVENOUS
Status: DISCONTINUED | OUTPATIENT
Start: 2018-05-16 | End: 2018-05-16

## 2018-05-16 RX ORDER — ENOXAPARIN SODIUM 100 MG/ML
40 INJECTION SUBCUTANEOUS EVERY 24 HOURS
Status: DISCONTINUED | OUTPATIENT
Start: 2018-05-17 | End: 2018-05-17 | Stop reason: HOSPADM

## 2018-05-16 RX ORDER — CEFAZOLIN SODIUM 1 G/3ML
2 INJECTION, POWDER, FOR SOLUTION INTRAMUSCULAR; INTRAVENOUS
Status: COMPLETED | OUTPATIENT
Start: 2018-05-16 | End: 2018-05-16

## 2018-05-16 RX ORDER — SODIUM CHLORIDE 9 MG/ML
INJECTION, SOLUTION INTRAVENOUS CONTINUOUS PRN
Status: DISCONTINUED | OUTPATIENT
Start: 2018-05-16 | End: 2018-05-16

## 2018-05-16 RX ORDER — PROPOFOL 10 MG/ML
VIAL (ML) INTRAVENOUS
Status: DISCONTINUED | OUTPATIENT
Start: 2018-05-16 | End: 2018-05-16

## 2018-05-16 RX ORDER — CEPHALEXIN 500 MG/1
500 CAPSULE ORAL EVERY 8 HOURS
Status: DISCONTINUED | OUTPATIENT
Start: 2018-05-16 | End: 2018-05-17 | Stop reason: HOSPADM

## 2018-05-16 RX ORDER — NORGESTIMATE AND ETHINYL ESTRADIOL 0.25-0.035
1 KIT ORAL DAILY
Status: DISCONTINUED | OUTPATIENT
Start: 2018-05-16 | End: 2018-05-17 | Stop reason: HOSPADM

## 2018-05-16 RX ORDER — MORPHINE SULFATE 2 MG/ML
4 INJECTION, SOLUTION INTRAMUSCULAR; INTRAVENOUS EVERY 4 HOURS PRN
Status: DISCONTINUED | OUTPATIENT
Start: 2018-05-16 | End: 2018-05-17 | Stop reason: HOSPADM

## 2018-05-16 RX ORDER — ONDANSETRON 4 MG/1
8 TABLET, ORALLY DISINTEGRATING ORAL EVERY 8 HOURS PRN
Qty: 14 TABLET | Refills: 0 | OUTPATIENT
Start: 2018-05-16 | End: 2018-05-16

## 2018-05-16 RX ORDER — OXYCODONE AND ACETAMINOPHEN 5; 325 MG/1; MG/1
1 TABLET ORAL EVERY 6 HOURS PRN
Qty: 28 TABLET | Refills: 0 | Status: SHIPPED | OUTPATIENT
Start: 2018-05-16 | End: 2018-06-01

## 2018-05-16 RX ORDER — LIDOCAINE HCL/PF 100 MG/5ML
SYRINGE (ML) INTRAVENOUS
Status: DISCONTINUED | OUTPATIENT
Start: 2018-05-16 | End: 2018-05-16

## 2018-05-16 RX ORDER — CITALOPRAM 40 MG/1
40 TABLET, FILM COATED ORAL NIGHTLY
Status: DISCONTINUED | OUTPATIENT
Start: 2018-05-16 | End: 2018-05-17 | Stop reason: HOSPADM

## 2018-05-16 RX ORDER — OXYCODONE AND ACETAMINOPHEN 5; 325 MG/1; MG/1
1 TABLET ORAL EVERY 4 HOURS PRN
Status: DISCONTINUED | OUTPATIENT
Start: 2018-05-16 | End: 2018-05-17 | Stop reason: HOSPADM

## 2018-05-16 RX ORDER — ONDANSETRON 8 MG/1
8 TABLET, ORALLY DISINTEGRATING ORAL EVERY 8 HOURS PRN
Status: DISCONTINUED | OUTPATIENT
Start: 2018-05-16 | End: 2018-05-17 | Stop reason: HOSPADM

## 2018-05-16 RX ORDER — CEPHALEXIN 500 MG/1
500 CAPSULE ORAL EVERY 8 HOURS
Qty: 30 CAPSULE | Refills: 0 | Status: SHIPPED | OUTPATIENT
Start: 2018-05-16 | End: 2018-05-17 | Stop reason: HOSPADM

## 2018-05-16 RX ORDER — SODIUM CHLORIDE 9 MG/ML
INJECTION, SOLUTION INTRAVENOUS CONTINUOUS
Status: DISCONTINUED | OUTPATIENT
Start: 2018-05-16 | End: 2018-05-16

## 2018-05-16 RX ORDER — AMOXICILLIN 250 MG
1 CAPSULE ORAL 2 TIMES DAILY
Status: DISCONTINUED | OUTPATIENT
Start: 2018-05-16 | End: 2018-05-17 | Stop reason: HOSPADM

## 2018-05-16 RX ORDER — LIDOCAINE HYDROCHLORIDE AND EPINEPHRINE 10; 10 MG/ML; UG/ML
INJECTION, SOLUTION INFILTRATION; PERINEURAL
Status: DISCONTINUED | OUTPATIENT
Start: 2018-05-16 | End: 2018-05-16 | Stop reason: HOSPADM

## 2018-05-16 RX ORDER — NEOSTIGMINE METHYLSULFATE 1 MG/ML
INJECTION, SOLUTION INTRAVENOUS
Status: DISCONTINUED | OUTPATIENT
Start: 2018-05-16 | End: 2018-05-16

## 2018-05-16 RX ORDER — ONDANSETRON 4 MG/1
8 TABLET, ORALLY DISINTEGRATING ORAL EVERY 8 HOURS PRN
Qty: 14 TABLET | Refills: 0 | Status: SHIPPED | OUTPATIENT
Start: 2018-05-16 | End: 2018-06-01

## 2018-05-16 RX ORDER — DEXAMETHASONE SODIUM PHOSPHATE 4 MG/ML
INJECTION, SOLUTION INTRA-ARTICULAR; INTRALESIONAL; INTRAMUSCULAR; INTRAVENOUS; SOFT TISSUE
Status: DISCONTINUED | OUTPATIENT
Start: 2018-05-16 | End: 2018-05-16

## 2018-05-16 RX ORDER — FENTANYL CITRATE 50 UG/ML
INJECTION, SOLUTION INTRAMUSCULAR; INTRAVENOUS
Status: DISCONTINUED | OUTPATIENT
Start: 2018-05-16 | End: 2018-05-16

## 2018-05-16 RX ORDER — CEPHALEXIN 500 MG/1
500 CAPSULE ORAL EVERY 8 HOURS
Qty: 30 CAPSULE | Refills: 0 | OUTPATIENT
Start: 2018-05-16 | End: 2018-05-16

## 2018-05-16 RX ORDER — LIDOCAINE HYDROCHLORIDE 10 MG/ML
1 INJECTION, SOLUTION EPIDURAL; INFILTRATION; INTRACAUDAL; PERINEURAL ONCE
Status: COMPLETED | OUTPATIENT
Start: 2018-05-16 | End: 2018-05-16

## 2018-05-16 RX ORDER — ROCURONIUM BROMIDE 10 MG/ML
INJECTION, SOLUTION INTRAVENOUS
Status: DISCONTINUED | OUTPATIENT
Start: 2018-05-16 | End: 2018-05-16

## 2018-05-16 RX ORDER — OXYCODONE AND ACETAMINOPHEN 5; 325 MG/1; MG/1
1 TABLET ORAL EVERY 6 HOURS PRN
Qty: 28 TABLET | Refills: 0 | Status: SHIPPED | OUTPATIENT
Start: 2018-05-16 | End: 2018-05-16

## 2018-05-16 RX ORDER — GLYCOPYRROLATE 0.2 MG/ML
INJECTION INTRAMUSCULAR; INTRAVENOUS
Status: DISCONTINUED | OUTPATIENT
Start: 2018-05-16 | End: 2018-05-16

## 2018-05-16 RX ADMIN — LIDOCAINE HYDROCHLORIDE AND EPINEPHRINE 4 ML: 10; 10 INJECTION, SOLUTION INFILTRATION; PERINEURAL at 10:05

## 2018-05-16 RX ADMIN — NEOSTIGMINE METHYLSULFATE 3.5 MG: 1 INJECTION INTRAVENOUS at 11:05

## 2018-05-16 RX ADMIN — FENTANYL CITRATE 50 MCG: 50 INJECTION, SOLUTION INTRAMUSCULAR; INTRAVENOUS at 10:05

## 2018-05-16 RX ADMIN — CEPHALEXIN 500 MG: 500 CAPSULE ORAL at 09:05

## 2018-05-16 RX ADMIN — CEPHALEXIN 500 MG: 500 CAPSULE ORAL at 03:05

## 2018-05-16 RX ADMIN — STANDARDIZED SENNA CONCENTRATE AND DOCUSATE SODIUM 1 TABLET: 8.6; 5 TABLET, FILM COATED ORAL at 12:05

## 2018-05-16 RX ADMIN — LIDOCAINE HYDROCHLORIDE 0.1 MG: 10 INJECTION, SOLUTION EPIDURAL; INFILTRATION; INTRACAUDAL; PERINEURAL at 08:05

## 2018-05-16 RX ADMIN — OXYCODONE HYDROCHLORIDE AND ACETAMINOPHEN 1 TABLET: 5; 325 TABLET ORAL at 12:05

## 2018-05-16 RX ADMIN — GLYCOPYRROLATE 0.4 MG: 0.2 INJECTION, SOLUTION INTRAMUSCULAR; INTRAVENOUS at 11:05

## 2018-05-16 RX ADMIN — LIDOCAINE HYDROCHLORIDE 60 MG: 20 INJECTION, SOLUTION INTRAVENOUS at 09:05

## 2018-05-16 RX ADMIN — PROPOFOL 150 MG: 10 INJECTION, EMULSION INTRAVENOUS at 09:05

## 2018-05-16 RX ADMIN — MIDAZOLAM HYDROCHLORIDE 2 MG: 1 INJECTION, SOLUTION INTRAMUSCULAR; INTRAVENOUS at 09:05

## 2018-05-16 RX ADMIN — CEFAZOLIN 2 G: 330 INJECTION, POWDER, FOR SOLUTION INTRAMUSCULAR; INTRAVENOUS at 10:05

## 2018-05-16 RX ADMIN — DEXAMETHASONE SODIUM PHOSPHATE 8 MG: 4 INJECTION, SOLUTION INTRAMUSCULAR; INTRAVENOUS at 10:05

## 2018-05-16 RX ADMIN — ROCURONIUM BROMIDE 35 MG: 10 INJECTION, SOLUTION INTRAVENOUS at 09:05

## 2018-05-16 RX ADMIN — SODIUM CHLORIDE: 0.9 INJECTION, SOLUTION INTRAVENOUS at 08:05

## 2018-05-16 RX ADMIN — STANDARDIZED SENNA CONCENTRATE AND DOCUSATE SODIUM 1 TABLET: 8.6; 5 TABLET, FILM COATED ORAL at 09:05

## 2018-05-16 RX ADMIN — FENTANYL CITRATE 100 MCG: 50 INJECTION, SOLUTION INTRAMUSCULAR; INTRAVENOUS at 09:05

## 2018-05-16 RX ADMIN — CITALOPRAM HYDROBROMIDE 40 MG: 40 TABLET ORAL at 09:05

## 2018-05-16 NOTE — ANESTHESIA RELEASE NOTE
"Anesthesia Release from PACU Note    Patient: Tammy Bonner    Procedure(s) Performed: Procedure(s) (LRB):  EXCISION-GLAND-SUBMANDIBULAR (Right)    Anesthesia type: general    Post pain: Adequate analgesia    Post assessment: no apparent anesthetic complications, tolerated procedure well and no evidence of recall    Last Vitals:   Visit Vitals  /63   Pulse 89   Temp 36.4 °C (97.5 °F) (Axillary)   Resp 18   Ht 4' 11" (1.499 m)   Wt 63.5 kg (140 lb)   LMP 04/16/2018   SpO2 (!) 94%   Breastfeeding? No   BMI 28.28 kg/m²       Post vital signs: stable    Level of consciousness: awake, alert  and oriented    Nausea/Vomiting: no nausea/no vomiting    Complications: none    Airway Patency: patent    Respiratory: unassisted    Cardiovascular: stable and blood pressure at baseline    Hydration: euvolemic  "

## 2018-05-16 NOTE — OP NOTE
DATE OF PROCEDURE:  05/16/2018    PREOPERATIVE DIAGNOSES:  1.  Chronic right submandibular sialadenitis.  2.  Right submandibular sialolithiasis.    POSTOPERATIVE DIAGNOSES:  1.  Chronic right submandibular sialadenitis.  2.  Right submandibular sialolithiasis.    PROCEDURE:  Resection of right submandibular gland.    SURGEON:  Lukasz Arndt M.D.    ASSISTANT:  Bandar Anthony M.D. (RES)    ANESTHESIA:  General endotracheal.    ESTIMATED BLOOD LOSS:  10 mL    SPECIMENS:  Right submandibular gland for permanent.    INDICATIONS FOR PROCEDURE:  Tammy is a 27-year-old young woman who recently   presented to me for evaluation of a right-sided submandibular gland stone with   associated sialadenitis.  She reported a single episode of acute sialadenitis   marked by significant pain and swelling of the gland.  She improved on oral   antibiotics.  I reviewed her CT scan, which revealed a large sialolith within   the proximal aspect of [place] duct involving the parenchyma of the gland.  It   appeared to measure approximately 1.2 cm in greatest dimension.  Given the size   of the stone, I did not feel that it was amenable to endoscopic retrieval and   offered her two options, one being per oral sialolithotomy and two which I   recommended as the most definitive procedure, right submandibular gland   resection.  She was apprised of the risks, benefits and alternatives to surgery.    In spite of the risks inherent to surgery, she provided informed consent.    PROCEDURE IN DETAIL:  The patient was taken to the Operating Room and placed on   the operating table in supine position.  General endotracheal anesthesia was   induced by the Anesthesia Team.  The right neck was addressed.  A roughly 4 cm   incision in the natural skin crease just below the submandibular gland was   marked and injected with several mL of 1% lidocaine with epinephrine.  The neck   was then prepped and draped in the standard sterile fashion.    To  begin, a #15 blade was utilized to incise the skin with sharp dissection   proceeding through the underlying subcutaneous tissue and platysmal muscle.  A   superiorly based subplatysmal flap was elevated up to the level of the mandible   utilizing a #15 blade.  The submandibular gland was readily palpable.  Blunt   dissection then proceeded down through the fibrofatty tissue overlying the   gland, which was divided with electrocautery.  Subfascial dissection then   proceeded through the capsule of the gland, elevating the fascia up to the level   of the mandible.  There was noted to be marked adherence of the fascia to the   gland due to the patient's chronic inflammation.  The inferior border of the   gland was then skeletonized to identify the digastric tendon.  The digastric   muscle was then skeletonized from posterior to anterior along the anterior belly   and then from medial to lateral to identify the mylohyoid muscle.  The   submental vessels and the vessels to the mylohyoid were then isolated, clipped   and divided.  The gland was then bluntly dissected away from the overlying   mandible.  Of note, the marginal mandibular nerve was not definitively   identified.  However, it was protected by elevating the fascia of the gland and   sweeping it superiorly.  The facial vessels were then encountered posteriorly,   circumferentially dissected, doubly clamped, divided and ligated with 2-0 silk   ties.  The specimen was then mobilized from posterior to anterior and from   superior to inferior to identify the posterior border of the mylohyoid.  The   mylohyoid was then retracted anteriorly and the submandibular ganglion was   dissected away from the underlying gland.  It was markedly adherent due to the   preexisting chronic inflammation.  It was then cauterized and divided, allowing   the lingual nerve to retract toward the floor of the mouth.  [place] duct was   then isolated as well, circumferentially dissected,  clipped distally and divided   and the specimen was freed from its final soft tissue attachments and passed   off to the scrub tech for transfer to Pathology for permanent analysis.  The   hypoglossal nerve was identified in the depths of our field and was preserved.    Hemostasis was then achieved with electrocautery and with the application of   several vascular clips to several small bleeding vessels.  The wound was then   packed with fibrillar.  A 10-Swedish Eligio drain was then placed and brought out   posteriorly and secured with a silk suture.  The neck was then closed with 3-0   Vicryl, 4-0 Monocryl and Dermabond.  The patient was handed back to Anesthesia,   awakened, extubated and transported to Recovery in satisfactory condition.    There were no intraoperative complications.  I was present and participated in   the entire procedure.      CPH/MAHIN  dd: 05/16/2018 12:08:37 (CDT)  td: 05/16/2018 12:45:50 (CDT)  Doc ID   #[id number]  Job ID #[id number]    CC:

## 2018-05-16 NOTE — NURSING TRANSFER
Nursing Transfer Note      5/16/2018     Transfer 924    Transfer via stretcher    Transfer with na    Transported by tech    Medicines sent: na    Chart send with patient: yes    Notified: Mother    Patient reassessed at: 5/16 @ 3326

## 2018-05-16 NOTE — INTERVAL H&P NOTE
The patient has been examined and the H&P has been reviewed:    I concur with the findings and no changes have occurred since H&P was written.    Anesthesia/Surgery risks, benefits and alternative options discussed and understood by patient/family.          Active Hospital Problems    Diagnosis  POA    Submandibular sialolithiasis [K11.5]  Yes      Resolved Hospital Problems    Diagnosis Date Resolved POA   No resolved problems to display.

## 2018-05-16 NOTE — TRANSFER OF CARE
"Anesthesia Transfer of Care Note    Patient: Tammy Bonner    Procedure(s) Performed: Procedure(s) (LRB):  EXCISION-GLAND-SUBMANDIBULAR (Right)    Patient location: PACU    Anesthesia Type: general    Transport from OR: Transported from OR on 6-10 L/min O2 by face mask with adequate spontaneous ventilation    Post pain: adequate analgesia    Post assessment: no apparent anesthetic complications    Post vital signs: stable    Level of consciousness: awake and alert    Nausea/Vomiting: no nausea/vomiting    Complications: none    Transfer of care protocol was followed      Last vitals:   Visit Vitals  BP (!) 107/54 (BP Location: Left arm, Patient Position: Lying)   Pulse 92   Temp 36.4 °C (97.5 °F) (Axillary)   Resp 15   Ht 4' 11" (1.499 m)   Wt 63.5 kg (140 lb)   LMP 04/16/2018   SpO2 98%   Breastfeeding? No   BMI 28.28 kg/m²     "

## 2018-05-16 NOTE — ANESTHESIA POSTPROCEDURE EVALUATION
"Anesthesia Post Evaluation    Patient: Tammy Bonner    Procedure(s) Performed: Procedure(s) (LRB):  EXCISION-GLAND-SUBMANDIBULAR (Right)    Final Anesthesia Type: general  Patient location during evaluation: PACU  Patient participation: Yes- Able to Participate  Level of consciousness: awake and alert and oriented  Post-procedure vital signs: reviewed and stable  Pain management: adequate  Airway patency: patent  PONV status at discharge: No PONV  Anesthetic complications: no      Cardiovascular status: blood pressure returned to baseline  Respiratory status: unassisted  Hydration status: euvolemic  Follow-up not needed.        Visit Vitals  /63   Pulse 89   Temp 36.4 °C (97.5 °F) (Axillary)   Resp 18   Ht 4' 11" (1.499 m)   Wt 63.5 kg (140 lb)   LMP 04/16/2018   SpO2 (!) 94%   Breastfeeding? No   BMI 28.28 kg/m²       Pain/Stu Score: Pain Assessment Performed: Yes (5/16/2018  1:15 PM)  Presence of Pain: denies (5/16/2018  1:15 PM)  Pain Rating Prior to Med Admin: 5 (5/16/2018 12:23 PM)  Pain Rating Post Med Admin: 0 (5/16/2018  1:15 PM)  Stu Score: 10 (5/16/2018  1:15 PM)      "

## 2018-05-17 VITALS
HEART RATE: 92 BPM | SYSTOLIC BLOOD PRESSURE: 98 MMHG | BODY MASS INDEX: 29.42 KG/M2 | WEIGHT: 145.94 LBS | DIASTOLIC BLOOD PRESSURE: 62 MMHG | HEIGHT: 59 IN | OXYGEN SATURATION: 95 % | TEMPERATURE: 98 F | RESPIRATION RATE: 18 BRPM

## 2018-05-17 PROCEDURE — 25000003 PHARM REV CODE 250: Performed by: OTOLARYNGOLOGY

## 2018-05-17 RX ADMIN — STANDARDIZED SENNA CONCENTRATE AND DOCUSATE SODIUM 1 TABLET: 8.6; 5 TABLET, FILM COATED ORAL at 08:05

## 2018-05-17 RX ADMIN — CEPHALEXIN 500 MG: 500 CAPSULE ORAL at 06:05

## 2018-05-17 NOTE — SUBJECTIVE & OBJECTIVE
Interval History: NAEON, tolerating po pain controlled     Medications:  Continuous Infusions:  Scheduled Meds:   cephALEXin  500 mg Oral Q8H    citalopram  40 mg Oral QHS    enoxaparin  40 mg Subcutaneous Daily    norgestimate-ethinyl estradiol  1 tablet Oral Daily    senna-docusate 8.6-50 mg  1 tablet Oral BID     PRN Meds:morphine, ondansetron, oxyCODONE-acetaminophen     Review of patient's allergies indicates:   Allergen Reactions    No known drug allergies      Objective:     Vital Signs (24h Range):  Temp:  [97.5 °F (36.4 °C)-99.7 °F (37.6 °C)] 98.9 °F (37.2 °C)  Pulse:  [79-98] 90  Resp:  [14-19] 18  SpO2:  [94 %-100 %] 94 %  BP: (103-118)/(54-69) 105/69        Lines/Drains/Airways     Drain                 Closed/Suction Drain 05/16/18 Right Neck Bulb 10 Fr. 1 day          Peripheral Intravenous Line                 Peripheral IV - Single Lumen 05/16/18 0847 Right Hand less than 1 day                Physical Exam  NAD A&O x3   Neck soft incision c/d/i, DARIAN in place with 20cc of serosanguinous drainage   Face symmetric, HB I bilaterally   Normal work of breathing     Significant Labs:  None    Significant Diagnostics:  None

## 2018-05-17 NOTE — NURSING
Discharge instructions, wound care instructions and prescriptions given to patient and family.  Patient verbalized understanding and had no further questions.  Patient's Iv removed and vital signs within normal limits.  Patient now awaiting wheelchair for discharge transport.

## 2018-05-17 NOTE — NURSING
Pt admitted to room 924. Pt has DARIAN drain with 20ml brown drainage noted to bulb suction. Incision noted to right side of jaw, well approximated with no redness or drainage. Tegaderm dressing intact to incision site.

## 2018-05-17 NOTE — ANESTHESIA POSTPROCEDURE EVALUATION
"Anesthesia Post Evaluation    Patient: Tammy Bonner    Procedure(s) Performed: Procedure(s) (LRB):  EXCISION-GLAND-SUBMANDIBULAR (Right)    Final Anesthesia Type: general  Patient location during evaluation: PACU  Patient participation: Yes- Able to Participate  Level of consciousness: awake and alert  Post-procedure vital signs: reviewed and stable  Pain management: adequate  Airway patency: patent  PONV status at discharge: No PONV  Anesthetic complications: no      Cardiovascular status: blood pressure returned to baseline  Respiratory status: unassisted  Hydration status: euvolemic  Follow-up not needed.        Visit Vitals  BP 98/62 (BP Location: Left arm, Patient Position: Lying)   Pulse 92   Temp 36.8 °C (98.3 °F) (Oral)   Resp 18   Ht 4' 11" (1.499 m)   Wt 66.2 kg (145 lb 15.1 oz)   LMP 04/16/2018   SpO2 95%   Breastfeeding? No   BMI 29.48 kg/m²       Pain/Stu Score: Pain Assessment Performed: Yes (5/16/2018  7:30 PM)  Presence of Pain: denies (5/16/2018  1:15 PM)  Pain Rating Prior to Med Admin: 5 (5/16/2018 12:23 PM)  Pain Rating Post Med Admin: 0 (5/16/2018  1:15 PM)  Stu Score: 10 (5/16/2018  1:15 PM)      "

## 2018-05-17 NOTE — PLAN OF CARE
Problem: Infection, Risk/Actual (Adult)  Goal: Infection Prevention/Resolution  Patient will demonstrate the desired outcomes by discharge/transition of care.  Outcome: Ongoing (interventions implemented as appropriate)  Reviewed POC with pt and family.

## 2018-05-17 NOTE — PROGRESS NOTES
[facility]  Otorhinolaryngology-Head & Neck Surgery  Progress Note    Subjective:     Post-Op Info:  Procedure(s) (LRB):  EXCISION-GLAND-SUBMANDIBULAR (Right)   1 Day Post-Op  Hospital Day: 2     Interval History: NAEON, tolerating po pain controlled     Medications:  Continuous Infusions:  Scheduled Meds:   cephALEXin  500 mg Oral Q8H    citalopram  40 mg Oral QHS    enoxaparin  40 mg Subcutaneous Daily    norgestimate-ethinyl estradiol  1 tablet Oral Daily    senna-docusate 8.6-50 mg  1 tablet Oral BID     PRN Meds:morphine, ondansetron, oxyCODONE-acetaminophen     Review of patient's allergies indicates:   Allergen Reactions    No known drug allergies      Objective:     Vital Signs (24h Range):  Temp:  [97.5 °F (36.4 °C)-99.7 °F (37.6 °C)] 98.9 °F (37.2 °C)  Pulse:  [79-98] 90  Resp:  [14-19] 18  SpO2:  [94 %-100 %] 94 %  BP: (103-118)/(54-69) 105/69        Lines/Drains/Airways     Drain                 Closed/Suction Drain 05/16/18 Right Neck Bulb 10 Fr. 1 day          Peripheral Intravenous Line                 Peripheral IV - Single Lumen 05/16/18 0847 Right Hand less than 1 day                Physical Exam  NAD A&O x3   Neck soft incision c/d/i, DARIAN in place with 20cc of serosanguinous drainage   Face symmetric, HB I bilaterally   Normal work of breathing     Significant Labs:  None    Significant Diagnostics:  None    Assessment/Plan:     * Submandibular sialolithiasis    POD # 1 s/p submandibular gland excision     -doing well   -advance diet   - drain harvest   - discharge home             Jhon Neely MD  Otorhinolaryngology-Head & Neck Surgery  [facility]

## 2018-05-17 NOTE — ASSESSMENT & PLAN NOTE
POD # 1 s/p submandibular gland excision     -doing well   -advance diet   - drain harvest   - discharge home

## 2018-05-17 NOTE — PLAN OF CARE
Problem: Patient Care Overview  Goal: Plan of Care Review  Outcome: Ongoing (interventions implemented as appropriate)  POC reviewed with patient. Pain controlled, no request/refused offer of pain medication this shift. DARIAN drain with minimal brownish/red drainage. Right neck incision approximated with dermabond. Slight swelling noted around incision site.  No nausea/vomiting. VS WNL. Continue to monitor.

## 2018-05-17 NOTE — DISCHARGE SUMMARY
"[facility]  Short Stay  Discharge Summary    Admit Date: 5/16/2018    Discharge Date and Time:  05/17/2018 7:33 AM      Discharge Attending Physician: Lukasz Arndt MD     Hospital Course Following completion of an electively scheduled procedure, the patient was transferred to the floor for postoperative monitoring.Her  hospital course was uneventful and noted for adequate pain control and PO intake following surgery. She   is discharged home in good condition and will follow-up with Dr. Sheikh              Final Diagnoses:    Principal Problem: Submandibular sialolithiasis   Secondary Diagnoses:   Active Hospital Problems    Diagnosis  POA    *Submandibular sialolithiasis [K11.5]  Yes      Resolved Hospital Problems    Diagnosis Date Resolved POA   No resolved problems to display.       Discharged Condition: good    Disposition: Home or Self Care    Follow up/Patient Instructions:    Medications:  Reconciled Home Medications:      Medication List      START taking these medications    ondansetron 4 MG Tbdl  Commonly known as:  ZOFRAN-ODT  Take 2 tablets (8 mg total) by mouth every 8 (eight) hours as needed.     oxyCODONE-acetaminophen 5-325 mg per tablet  Commonly known as:  PERCOCET  Take 1 tablet by mouth every 6 (six) hours as needed.        CONTINUE taking these medications    citalopram 40 MG tablet  Commonly known as:  CELEXA  Take 1 tablet (40 mg total) by mouth once daily.     norgestimate-ethinyl estradiol 0.25-35 mg-mcg per tablet  Commonly known as:  ESTARYLLA  Take 1 tablet by mouth once daily.     ONE DAILY MULTIVITAMIN per tablet  Generic drug:  multivitamin  Take 1 tablet by mouth once daily.            Discharge Procedure Orders  Diet Adult Regular     Other restrictions (specify):   Order Comments: No heavy lifting > 10 lbs x 2 weeks. Keep Incision clean and dry. Keep open to air .Okay to shower . DO NOT scrub, soak, or submerge incision. Pad to dry.     No dressing " needed       Follow-up Information     Lukasz Arndt MD. Schedule an appointment as soon as possible for a visit in 1 week.    Specialty:  Otolaryngology  Why:  For wound re-check  Contact information:  [address]  [facility] [id number] [phone]

## 2018-05-28 ENCOUNTER — OFFICE VISIT (OUTPATIENT)
Dept: OTOLARYNGOLOGY | Facility: CLINIC | Age: 28
End: 2018-05-28
Payer: COMMERCIAL

## 2018-05-28 VITALS — TEMPERATURE: 98 F | HEART RATE: 103 BPM | DIASTOLIC BLOOD PRESSURE: 69 MMHG | SYSTOLIC BLOOD PRESSURE: 100 MMHG

## 2018-05-28 DIAGNOSIS — K11.5 SUBMANDIBULAR SIALOLITHIASIS: ICD-10-CM

## 2018-05-28 PROCEDURE — 99999 PR PBB SHADOW E&M-EST. PATIENT-LVL II: CPT | Mod: PBBFAC,,, | Performed by: OTOLARYNGOLOGY

## 2018-05-28 PROCEDURE — 99024 POSTOP FOLLOW-UP VISIT: CPT | Mod: S$GLB,,, | Performed by: OTOLARYNGOLOGY

## 2018-05-28 NOTE — PROGRESS NOTES
Chief Complaint   Patient presents with    Post-op Evaluation       HPI   28 y.o. female presents status post R SMG resection.  No complaints.     Review of Systems   Constitutional: Negative for fatigue and unexpected weight change.   HENT: Per HPI.  Eyes: Negative for visual disturbance.   Respiratory: Negative for shortness of breath, hemoptysis   Cardiovascular: Negative for chest pain and palpitations.   Musculoskeletal: Negative for decreased ROM, back pain.   Skin: Negative for rash, sunburn, itching.   Neurological: Negative for dizziness and seizures.   Hematological: Negative for adenopathy. Does not bruise/bleed easily.   Endocrine: Negative for rapid weight loss/weight gain, heat/cold intolerance.     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  Social History     Social History    Marital status: Single     Spouse name: N/A    Number of children: N/A    Years of education: N/A     Occupational History    Not on file.     Social History Main Topics    Smoking status: Never Smoker    Smokeless tobacco: Never Used    Alcohol use No    Drug use: No    Sexual activity: Yes     Partners: Female     Birth control/ protection: OCP     Other Topics Concern    Not on file     Social History Narrative    No narrative on file         Allergies    Review of patient's allergies indicates:   Allergen Reactions    No known drug allergies            Physical Exam     Vitals:    05/28/18 0845   BP: 100/69   Pulse: 103   Temp: 98.1 °F (36.7 °C)         There is no height or weight on file to calculate BMI.      General: AOx3, NAD   Respiratory:  Symmetric chest rise, normal effort.  Neck: R neck incision healing well.  No cervical lymphadenopathy, thyromegaly or thyroid nodules.  Normal range of motion.    Face: House Brackmann I bilaterally.     Path: reviewed    Assessment/Plan  Problem List Items Addressed This Visit        ENT    Submandibular sialolithiasis     Doing well status post R SMG resection.  RTC PRN.

## 2018-05-31 ENCOUNTER — PATIENT MESSAGE (OUTPATIENT)
Dept: OTOLARYNGOLOGY | Facility: CLINIC | Age: 28
End: 2018-05-31

## 2018-06-01 ENCOUNTER — OFFICE VISIT (OUTPATIENT)
Dept: PSYCHIATRY | Facility: CLINIC | Age: 28
End: 2018-06-01
Payer: COMMERCIAL

## 2018-06-01 VITALS
BODY MASS INDEX: 28.6 KG/M2 | DIASTOLIC BLOOD PRESSURE: 56 MMHG | WEIGHT: 141.88 LBS | HEART RATE: 104 BPM | SYSTOLIC BLOOD PRESSURE: 112 MMHG | HEIGHT: 59 IN

## 2018-06-01 DIAGNOSIS — F80.81 STUTTERING: ICD-10-CM

## 2018-06-01 DIAGNOSIS — F41.1 GAD (GENERALIZED ANXIETY DISORDER): ICD-10-CM

## 2018-06-01 DIAGNOSIS — F84.5 ASPERGER SYNDROME: ICD-10-CM

## 2018-06-01 PROCEDURE — 3008F BODY MASS INDEX DOCD: CPT | Mod: CPTII,S$GLB,, | Performed by: PSYCHIATRY & NEUROLOGY

## 2018-06-01 PROCEDURE — 99999 PR PBB SHADOW E&M-EST. PATIENT-LVL II: CPT | Mod: PBBFAC,,, | Performed by: PSYCHIATRY & NEUROLOGY

## 2018-06-01 PROCEDURE — 99213 OFFICE O/P EST LOW 20 MIN: CPT | Mod: S$GLB,,, | Performed by: PSYCHIATRY & NEUROLOGY

## 2018-06-01 RX ORDER — CITALOPRAM 40 MG/1
40 TABLET, FILM COATED ORAL DAILY
Qty: 90 TABLET | Refills: 1 | Status: SHIPPED | OUTPATIENT
Start: 2018-06-01 | End: 2018-12-13 | Stop reason: SDUPTHER

## 2018-06-01 NOTE — PROGRESS NOTES
Outpatient Psychiatry Follow-Up Visit (MD/NP)    6/1/2018    Clinical Status of Patient:  Outpatient (Ambulatory)    Chief Complaint:  Tammy Bonner is a 28 y.o. female who presents today for follow-up of anxiety primarily, hx of low mood, stuttering, asperger's associated issues        Met with patient.      Interval History and Content of Current Session:  Interim Events/Subjective Report/Content of Current Session:    She recently had surgery to remove a calcified salivary gland.  This went well.  Mood has been good.  She had one outburst the week before her menstrual cycle.  She denies a great deal of anxiety or nervousness.   When she does it is arounde her cycle .  Wishes she had better control of ehr emotions.  Work is going well.  She reports sleeping okay.  6.5-7 hrs nightly and having difficulty getting up in the AM.  She is not late for work.  Denies SI since last appt.  Lost weight because of the above making it difficult to swallow.  She is exercising, sometimes doing it with a friend from work.           Psychotherapy:  · Target symptoms: mood swings, impulsivity, SI  · Why chosen therapy is appropriate versus another modality: relevant to diagnosis, patient responds to this modality  · Outcome monitoring methods: self-report, observation  · Therapeutic intervention type: supportive psychotherapy  · Topics discussed/themes: difficulty managing affect in interpersonal relationships, building skills sets for symptom management, symptom recognition.    · The patient's response to the intervention is accepting. The patient's progress toward treatment goals is good.   · Duration of intervention: 10 mins    Review of Systems   · PSYCHIATRIC: Pertinant items are noted in the narrative.  · CONSTITUTIONAL: See above.   · RESPIRATORY: No shortness of breath.  · CARDIOVASCULAR: No tachycardia or chest pain.  · GASTROINTESTINAL: No nausea, vomiting, pain, constipation or diarrhea.    Past Medical, Family and  "Social History: The patient's past medical, family and social history have been reviewed and updated as appropriate within the electronic medical record - see encounter notes.    Compliance: yes    Side effects: None    Risk Parameters:  Patient reports no suicidal ideation  Patient reports no homicidal ideation  Patient reports no self-injurious behavior  Patient reports no violent behavior    Exam (detailed: at least 9 elements; comprehensive: all 15 elements)   Constitutional  Vitals:  Most recent vital signs, dated less than 90 days prior to this appointment, were reviewed.    Vitals:    06/01/18 0823   BP: (!) 112/56   Pulse: 104   Weight: 64.4 kg (141 lb 13.9 oz)   Height: 4' 11" (1.499 m)        General:  age appropriate, well dressed, neatly groomed, overweight     Musculoskeletal  Muscle Strength/Tone:  no tremor   Gait & Station:  normal, no ataxia     Psychiatric  Speech:  clearly audible, quite verbal, some mild studder, at times rapid   Mood:   Affect:  euthymic  congruent and appropriate   Thought Process:  logical   Associations:  intact   Thought Content:  normal, no suicidality, no homicidality, delusions, or paranoia   Insight:   Judgement:  fair  adequate to circumstances   Orientation:  grossly intact   Memory: intact for content of interview   Language: grossly intact   Attention Span & Concentration:  able to focus   Fund of Knowledge:  intact and appropriate to age and level of education     Assessment and Diagnosis   Status/Progress: Based on the examination today, the patient's problem(s) is/are adequately but not ideally controlled.  New problems have been presented today.   Comorbidities are complicating management of the primary condition:Asperger's complicating her DORIS.  There are no active rule-out diagnoses for this patient at this time.    General Impression: 28 y.o. F with Asperger's and DORIS.  DORIS relatively well controlled currently.   Has some problems with anger at times, " typically with parents.      Diagnosis:  Asperger's Disorder (299.80)  Generalized Anxiety Disorder (300.02).         Intervention/Counseling/Treatment Plan   · Continue current medication, citalopram 40mg daily.        Return to Clinic: 6 months

## 2018-06-06 ENCOUNTER — TELEPHONE (OUTPATIENT)
Dept: OTOLARYNGOLOGY | Facility: CLINIC | Age: 28
End: 2018-06-06

## 2018-06-06 NOTE — TELEPHONE ENCOUNTER
----- Message from Yohanacamacho Loaiza sent at 6/6/2018 11:52 AM CDT -----  Contact: Shira Mcneil [employer]   Needs Advice    Reason for call: Calling to find out CPT code for procedure on 05/16, next post op appt     Communication Preference: [phone]

## 2018-06-12 ENCOUNTER — OFFICE VISIT (OUTPATIENT)
Dept: INTERNAL MEDICINE | Facility: CLINIC | Age: 28
End: 2018-06-12
Payer: COMMERCIAL

## 2018-06-12 VITALS
HEIGHT: 59 IN | WEIGHT: 143.06 LBS | SYSTOLIC BLOOD PRESSURE: 110 MMHG | HEART RATE: 88 BPM | BODY MASS INDEX: 28.84 KG/M2 | DIASTOLIC BLOOD PRESSURE: 68 MMHG

## 2018-06-12 DIAGNOSIS — Z00.00 ANNUAL PHYSICAL EXAM: Primary | ICD-10-CM

## 2018-06-12 PROCEDURE — 99395 PREV VISIT EST AGE 18-39: CPT | Mod: S$GLB,,, | Performed by: INTERNAL MEDICINE

## 2018-06-12 PROCEDURE — 99999 PR PBB SHADOW E&M-EST. PATIENT-LVL III: CPT | Mod: PBBFAC,,, | Performed by: INTERNAL MEDICINE

## 2018-06-12 NOTE — PROGRESS NOTES
"Subjective:       Patient ID: Tammy Bonner is a 28 y.o. female.    Chief Complaint: Annual Exam   This is a 28-year-old who presents today for physical.  Patient reports that she has been doing well since last visit.  She was having difficulty with submandibular sialolithiasis on the right and it persisted and became painful she was unable to eat and had lost some weight associated with that.  She did getting with ENT and underwent surgery with improvement and resolution of her symptoms.  Her appetite has returned she is back to eating although she has been able to try to keep the weight down.  She continues to exercise and stay active.  She also continues takes citalopram and follow with her psychiatrist regularly.  She does report mood swings more prominent around the times of her menstrual cycles.  She follows in the breast Center for her fibroadenomas has had no increased pain or changes over time  She is continuing to work at the bank   .  HPI  Review of Systems   Constitutional: Negative for fever.   Respiratory: Negative for cough, shortness of breath and wheezing.    Cardiovascular: Negative for chest pain and palpitations.   Gastrointestinal: Negative for abdominal pain and constipation.   Neurological: Negative for dizziness.       Objective:     Blood pressure 110/68, pulse 88, height 4' 11" (1.499 m), weight 64.9 kg (143 lb 1.3 oz).    Physical Exam   Constitutional: No distress.   HENT:   Head: Normocephalic.   Mouth/Throat: Oropharynx is clear and moist.   Post surgical scar right submandibular  Healing    Eyes: No scleral icterus.   Neck: Neck supple.   Cardiovascular: Normal rate, regular rhythm and normal heart sounds.  Exam reveals no gallop and no friction rub.    No murmur heard.  Pulmonary/Chest: Effort normal and breath sounds normal. No respiratory distress.   Non tender      Abdominal: Soft. Bowel sounds are normal. She exhibits no mass. There is no tenderness.   Musculoskeletal: She " exhibits no edema.   Neurological: She is alert.   Skin: No erythema.   Psychiatric: She has a normal mood and affect.   Vitals reviewed.      Assessment:       1. Annual physical exam    2. Fibroadenoma of breast, unspecified laterality        Plan:       Tammy was seen today for annual exam.    Diagnoses and all orders for this visit:    Annual physical exam  -     CBC auto differential; Future  -     Lipid panel; Future  -     TSH; Future  -     Basic metabolic panel; Future    She continues to follow with breast Center annually hx fibroadenomas  She will schedule her annual gyn appointment    Update of additional blood work and review    Sp submandiublar sialothiasis continued healing scar call if concerns    Follow up annually sooner if concern

## 2018-06-13 ENCOUNTER — LAB VISIT (OUTPATIENT)
Dept: LAB | Facility: HOSPITAL | Age: 28
End: 2018-06-13
Attending: INTERNAL MEDICINE
Payer: COMMERCIAL

## 2018-06-13 ENCOUNTER — PATIENT MESSAGE (OUTPATIENT)
Dept: INTERNAL MEDICINE | Facility: CLINIC | Age: 28
End: 2018-06-13

## 2018-06-13 DIAGNOSIS — Z00.00 ANNUAL PHYSICAL EXAM: ICD-10-CM

## 2018-06-13 LAB
ANION GAP SERPL CALC-SCNC: 6 MMOL/L
BASOPHILS # BLD AUTO: 0.01 K/UL
BASOPHILS NFR BLD: 0.1 %
BUN SERPL-MCNC: 14 MG/DL
CALCIUM SERPL-MCNC: 9.2 MG/DL
CHLORIDE SERPL-SCNC: 109 MMOL/L
CHOLEST SERPL-MCNC: 178 MG/DL
CHOLEST/HDLC SERPL: 2.3 {RATIO}
CO2 SERPL-SCNC: 26 MMOL/L
CREAT SERPL-MCNC: 0.7 MG/DL
DIFFERENTIAL METHOD: ABNORMAL
EOSINOPHIL # BLD AUTO: 0.2 K/UL
EOSINOPHIL NFR BLD: 2.2 %
ERYTHROCYTE [DISTWIDTH] IN BLOOD BY AUTOMATED COUNT: 13.4 %
EST. GFR  (AFRICAN AMERICAN): >60 ML/MIN/1.73 M^2
EST. GFR  (NON AFRICAN AMERICAN): >60 ML/MIN/1.73 M^2
GLUCOSE SERPL-MCNC: 90 MG/DL
HCT VFR BLD AUTO: 36.4 %
HDLC SERPL-MCNC: 76 MG/DL
HDLC SERPL: 42.7 %
HGB BLD-MCNC: 12.3 G/DL
LDLC SERPL CALC-MCNC: 86.2 MG/DL
LYMPHOCYTES # BLD AUTO: 1.6 K/UL
LYMPHOCYTES NFR BLD: 21.4 %
MCH RBC QN AUTO: 29.7 PG
MCHC RBC AUTO-ENTMCNC: 33.8 G/DL
MCV RBC AUTO: 88 FL
MONOCYTES # BLD AUTO: 0.5 K/UL
MONOCYTES NFR BLD: 7.1 %
NEUTROPHILS # BLD AUTO: 5 K/UL
NEUTROPHILS NFR BLD: 68.9 %
NONHDLC SERPL-MCNC: 102 MG/DL
PLATELET # BLD AUTO: 324 K/UL
PMV BLD AUTO: 9.2 FL
POTASSIUM SERPL-SCNC: 4.4 MMOL/L
RBC # BLD AUTO: 4.14 M/UL
SODIUM SERPL-SCNC: 141 MMOL/L
TRIGL SERPL-MCNC: 79 MG/DL
TSH SERPL DL<=0.005 MIU/L-ACNC: 1.03 UIU/ML
WBC # BLD AUTO: 7.23 K/UL

## 2018-06-13 PROCEDURE — 84443 ASSAY THYROID STIM HORMONE: CPT

## 2018-06-13 PROCEDURE — 85025 COMPLETE CBC W/AUTO DIFF WBC: CPT

## 2018-06-13 PROCEDURE — 80061 LIPID PANEL: CPT

## 2018-06-13 PROCEDURE — 80048 BASIC METABOLIC PNL TOTAL CA: CPT

## 2018-06-13 PROCEDURE — 36415 COLL VENOUS BLD VENIPUNCTURE: CPT

## 2018-07-22 DIAGNOSIS — F41.1 GAD (GENERALIZED ANXIETY DISORDER): ICD-10-CM

## 2018-07-22 DIAGNOSIS — F80.81 STUTTERING: ICD-10-CM

## 2018-07-22 DIAGNOSIS — F84.5 ASPERGER SYNDROME: ICD-10-CM

## 2018-07-23 RX ORDER — CITALOPRAM 40 MG/1
40 TABLET, FILM COATED ORAL DAILY
Qty: 90 TABLET | Refills: 1 | OUTPATIENT
Start: 2018-07-23

## 2018-08-15 ENCOUNTER — CLINICAL SUPPORT (OUTPATIENT)
Dept: OPHTHALMOLOGY | Facility: CLINIC | Age: 28
End: 2018-08-15
Payer: COMMERCIAL

## 2018-08-15 ENCOUNTER — OFFICE VISIT (OUTPATIENT)
Dept: OPTOMETRY | Facility: CLINIC | Age: 28
End: 2018-08-15
Payer: COMMERCIAL

## 2018-08-15 DIAGNOSIS — H40.013 OPEN ANGLE WITH BORDERLINE FINDINGS, LOW RISK, BILATERAL: Primary | ICD-10-CM

## 2018-08-15 DIAGNOSIS — H40.013 OPEN ANGLE WITH BORDERLINE FINDINGS, LOW RISK, BILATERAL: ICD-10-CM

## 2018-08-15 DIAGNOSIS — H52.13 MYOPIA OF BOTH EYES: ICD-10-CM

## 2018-08-15 PROCEDURE — 92020 GONIOSCOPY: CPT | Mod: S$GLB,,, | Performed by: OPTOMETRIST

## 2018-08-15 PROCEDURE — 99999 PR PBB SHADOW E&M-EST. PATIENT-LVL II: CPT | Mod: PBBFAC,,, | Performed by: OPTOMETRIST

## 2018-08-15 PROCEDURE — 92133 CPTRZD OPH DX IMG PST SGM ON: CPT | Mod: S$GLB,,, | Performed by: OPTOMETRIST

## 2018-08-15 PROCEDURE — 92015 DETERMINE REFRACTIVE STATE: CPT | Mod: S$GLB,,, | Performed by: OPTOMETRIST

## 2018-08-15 PROCEDURE — 92014 COMPRE OPH EXAM EST PT 1/>: CPT | Mod: S$GLB,,, | Performed by: OPTOMETRIST

## 2018-08-15 PROCEDURE — 92083 EXTENDED VISUAL FIELD XM: CPT | Mod: S$GLB,,, | Performed by: OPTOMETRIST

## 2018-08-15 NOTE — PROGRESS NOTES
HPI     DLS:8/9/17    Pt states no VA changes. No f/f     Uses the [facility] brand of refresh     glauc susp by CDs        Last edited by Dru Gavin, OD on 8/15/2018  2:04 PM. (History)        ROS     Positive for: Eyes (glauc susp by CDs)    Negative for: Constitutional, Gastrointestinal, Neurological, Skin,   Genitourinary, Musculoskeletal, HENT, Endocrine, Cardiovascular,   Respiratory, Psychiatric, Allergic/Imm, Heme/Lymph    Last edited by Dru Gavin, OD on 8/15/2018  2:04 PM. (History)        Assessment /Plan     For exam results, see Encounter Report.    Open angle with borderline findings, low risk, bilateral  -     Padilla Visual Field - OU - Extended - Both Eyes; Future; Expected date: 08/15/2019  -     Posterior Segment OCT Optic Nerve- Both eyes; Future; Expected date: 08/15/2019    Myopia of both eyes      1. Glauc susp by CDs--see OCT (RNFL wnl OU).  iop hi normal without meds.  VF today  wnl.  +famhx (dad). Pach: 682/684.  Angles open by gonio OU.  Doubt glauc now--will monitor  2. Slight myopia--pt happy without spex         Plan:    rtc 1 yr for HVF 24-2 sf/OCT/full

## 2018-10-25 ENCOUNTER — TELEPHONE (OUTPATIENT)
Dept: SURGERY | Facility: CLINIC | Age: 28
End: 2018-10-25

## 2018-10-25 NOTE — TELEPHONE ENCOUNTER
Contacted the patient regarding message sent to schedule annual breast exam.  The patient did not answer, message left for the patient to contact our office regarding this matter.

## 2018-10-26 ENCOUNTER — TELEPHONE (OUTPATIENT)
Dept: SURGERY | Facility: CLINIC | Age: 28
End: 2018-10-26

## 2018-10-26 NOTE — TELEPHONE ENCOUNTER
Left VM with my direct contact information, patient advised to give a return call with any questions or concerns regarding scheduling annual breast exam

## 2018-10-26 NOTE — TELEPHONE ENCOUNTER
----- Message from Bill Wall sent at 10/26/2018  3:07 PM CDT -----  Contact: pt  Needs Advice    Reason for call: [facility]         Communication Preference: [phone]     Additional Information:

## 2018-10-29 ENCOUNTER — TELEPHONE (OUTPATIENT)
Dept: SURGERY | Facility: CLINIC | Age: 28
End: 2018-10-29

## 2018-10-29 NOTE — TELEPHONE ENCOUNTER
----- Message from Theresa Duarte RN sent at 10/26/2018  3:10 PM CDT -----  Contact: pt  Please follow up with the patient on Monday.  I left her a voicemail with Charli's number.  Thanks.    Rachel    ----- Message -----  From: Bill Wall  Sent: 10/26/2018   3:07 PM  To: Nel Turner Staff    Needs Advice    Reason for call: returning [facility] call         Communication Preference: [phone]     Additional Information:

## 2018-10-29 NOTE — TELEPHONE ENCOUNTER
Contacted the patient regarding scheduling her follow up appointment for her breast care.  Stated to the patient that I could not schedule her appointment at this time , but once the schedule is available I will contact her to schedule the appointment.  The patient voiced understanding of this information.

## 2018-12-13 DIAGNOSIS — F80.81 STUTTERING: ICD-10-CM

## 2018-12-13 DIAGNOSIS — F84.5 ASPERGER SYNDROME: ICD-10-CM

## 2018-12-13 DIAGNOSIS — F41.1 GAD (GENERALIZED ANXIETY DISORDER): ICD-10-CM

## 2018-12-13 RX ORDER — CITALOPRAM 40 MG/1
TABLET, FILM COATED ORAL
Qty: 90 TABLET | Refills: 0 | Status: SHIPPED | OUTPATIENT
Start: 2018-12-13 | End: 2019-01-30 | Stop reason: SDUPTHER

## 2018-12-14 DIAGNOSIS — Z30.41 ENCOUNTER FOR SURVEILLANCE OF CONTRACEPTIVE PILLS: ICD-10-CM

## 2018-12-17 ENCOUNTER — TELEPHONE (OUTPATIENT)
Dept: OBSTETRICS AND GYNECOLOGY | Facility: CLINIC | Age: 28
End: 2018-12-17

## 2018-12-17 RX ORDER — NORGESTIMATE AND ETHINYL ESTRADIOL 0.25-0.035
KIT ORAL
Qty: 28 TABLET | Refills: 0 | Status: SHIPPED | OUTPATIENT
Start: 2018-12-17 | End: 2019-01-22 | Stop reason: SDUPTHER

## 2019-01-12 DIAGNOSIS — Z30.41 ENCOUNTER FOR SURVEILLANCE OF CONTRACEPTIVE PILLS: ICD-10-CM

## 2019-01-12 RX ORDER — NORGESTIMATE AND ETHINYL ESTRADIOL 0.25-0.035
KIT ORAL
Qty: 28 TABLET | Refills: 0 | Status: CANCELLED | OUTPATIENT
Start: 2019-01-12

## 2019-01-14 ENCOUNTER — TELEPHONE (OUTPATIENT)
Dept: SURGERY | Facility: CLINIC | Age: 29
End: 2019-01-14

## 2019-01-14 NOTE — TELEPHONE ENCOUNTER
Contacted the patient regarding scheduling appointment with Kamron Talavera NP for annual breast exam.  The patient did not answer, message left for the patient to contact our office regarding this matter.

## 2019-01-16 ENCOUNTER — TELEPHONE (OUTPATIENT)
Dept: OBSTETRICS AND GYNECOLOGY | Facility: CLINIC | Age: 29
End: 2019-01-16

## 2019-01-16 NOTE — TELEPHONE ENCOUNTER
----- Message from Vidhya Freed sent at 1/16/2019 11:34 AM CST -----  Contact: [facility]       Can the clinic reply in [facility]: no    Please refill the medication(s) listed below. Please call the patient when the prescription(s) is ready for  at this phone number   [phone] (home)   Medication #1 FEMYNOR 0.25-35 mg-mcg per tablet     Preferred Pharmacy:   [facility]/pharmacy #[id number] - [place] - [address] [place] [address]  Phone: [phone] Fax: [phone]

## 2019-01-17 ENCOUNTER — TELEPHONE (OUTPATIENT)
Dept: OBSTETRICS AND GYNECOLOGY | Facility: CLINIC | Age: 29
End: 2019-01-17

## 2019-01-17 ENCOUNTER — TELEPHONE (OUTPATIENT)
Dept: SURGERY | Facility: CLINIC | Age: 29
End: 2019-01-17

## 2019-01-17 ENCOUNTER — PATIENT MESSAGE (OUTPATIENT)
Dept: SURGERY | Facility: CLINIC | Age: 29
End: 2019-01-17

## 2019-01-17 NOTE — TELEPHONE ENCOUNTER
----- Message from Abby Benavides sent at 1/17/2019  9:49 AM CST -----  Name of Who is Calling: RAGHAV DIEGO [[id number]]#      What is the request in detail: Pt is returning a call to kirby    Can the clinic reply by [facility]:    No       What Number [facility] Back [facility]: [phone]

## 2019-01-17 NOTE — TELEPHONE ENCOUNTER
----- Message from Radha Rascon sent at 1/17/2019  9:41 AM CST -----  Contact: Pt  Patient Returning Call from [facility]    Who Left Message for Patient: Petralouie   Communication Preference: # [phone]  Additional Information:

## 2019-01-22 ENCOUNTER — PATIENT MESSAGE (OUTPATIENT)
Dept: OBSTETRICS AND GYNECOLOGY | Facility: CLINIC | Age: 29
End: 2019-01-22

## 2019-01-22 ENCOUNTER — OFFICE VISIT (OUTPATIENT)
Dept: OBSTETRICS AND GYNECOLOGY | Facility: CLINIC | Age: 29
End: 2019-01-22
Payer: COMMERCIAL

## 2019-01-22 VITALS
BODY MASS INDEX: 30.67 KG/M2 | SYSTOLIC BLOOD PRESSURE: 118 MMHG | WEIGHT: 152.13 LBS | DIASTOLIC BLOOD PRESSURE: 70 MMHG | HEIGHT: 59 IN

## 2019-01-22 DIAGNOSIS — Z11.3 VENEREAL DISEASE SCREENING: ICD-10-CM

## 2019-01-22 DIAGNOSIS — Z01.419 VISIT FOR GYNECOLOGIC EXAMINATION: Primary | ICD-10-CM

## 2019-01-22 DIAGNOSIS — N72 CERVICITIS: ICD-10-CM

## 2019-01-22 DIAGNOSIS — Z30.41 ENCOUNTER FOR SURVEILLANCE OF CONTRACEPTIVE PILLS: ICD-10-CM

## 2019-01-22 LAB
C TRACH DNA SPEC QL NAA+PROBE: NOT DETECTED
CANDIDA RRNA VAG QL PROBE: POSITIVE
G VAGINALIS RRNA GENITAL QL PROBE: NEGATIVE
N GONORRHOEA DNA SPEC QL NAA+PROBE: NOT DETECTED
T VAGINALIS RRNA GENITAL QL PROBE: NEGATIVE

## 2019-01-22 PROCEDURE — 99999 PR PBB SHADOW E&M-EST. PATIENT-LVL III: CPT | Mod: PBBFAC,,, | Performed by: OBSTETRICS & GYNECOLOGY

## 2019-01-22 PROCEDURE — 87480 CANDIDA DNA DIR PROBE: CPT

## 2019-01-22 PROCEDURE — 99395 PR PREVENTIVE VISIT,EST,18-39: ICD-10-PCS | Mod: S$GLB,,, | Performed by: OBSTETRICS & GYNECOLOGY

## 2019-01-22 PROCEDURE — 99999 PR PBB SHADOW E&M-EST. PATIENT-LVL III: ICD-10-PCS | Mod: PBBFAC,,, | Performed by: OBSTETRICS & GYNECOLOGY

## 2019-01-22 PROCEDURE — 87491 CHLMYD TRACH DNA AMP PROBE: CPT

## 2019-01-22 PROCEDURE — 87510 GARDNER VAG DNA DIR PROBE: CPT

## 2019-01-22 PROCEDURE — 99395 PREV VISIT EST AGE 18-39: CPT | Mod: S$GLB,,, | Performed by: OBSTETRICS & GYNECOLOGY

## 2019-01-22 PROCEDURE — 88175 CYTOPATH C/V AUTO FLUID REDO: CPT

## 2019-01-22 RX ORDER — NORGESTIMATE AND ETHINYL ESTRADIOL 0.25-0.035
1 KIT ORAL DAILY
Qty: 84 TABLET | Refills: 3 | Status: SHIPPED | OUTPATIENT
Start: 2019-01-22 | End: 2019-12-12 | Stop reason: SDUPTHER

## 2019-01-22 RX ORDER — FLUCONAZOLE 150 MG/1
150 TABLET ORAL ONCE
Qty: 1 TABLET | Refills: 1 | Status: SHIPPED | OUTPATIENT
Start: 2019-01-22 | End: 2019-01-22

## 2019-01-22 NOTE — PROGRESS NOTES
HISTORY OF PRESENT ILLNESS:    Tammy Bonner is a 28 y.o. female, , Patient's last menstrual period was 2019.,  presents for a routine exam and has no complaints. PAP DUE, OCP REFILL.  SPOTS 4 DAYS PRIOR TO PERIOD, TOTAL OF 8 DAYS BLEEDING. STD SCREEN FOR POM.  DISCUSSED CERVIX FRIABLE, DISCUSSED TOPICAL ALLERGENS, ADVISED POSS YEAST - WHITE DISCHARGE AND MONISTAT 3 SUPPOS.  WILL RX 2 TABS OF DIFLUCAN IN CASE DESIRED BUT CANNOT DO REGULARLY WHILE TAKING CELEXA  WORKS [employer]    LAST VISIT LIU 2017:  Tammy Bonner is a 27 y.o. female, , Patient's last menstrual period was 2017 (approximate).,  presents for a routine exam and OCP refills.  -Requests STD screening.    Past Medical History:   Diagnosis Date    Abnormal cervical Papanicolaou smear     Re-pap    ALLERGIC RHINITIS     Anxiety     Asperger's syndrome     learning disability    Depression     Fibroadenoma of right breast     Glaucoma suspect        Past Surgical History:   Procedure Laterality Date    BIOPSY-EXCISIONAL-breast Right 2014    Performed by [facility] at [facility] OR 2ND FLR    BREAST BIOPSY      Right - fiboradenoma    EXCISION-GLAND-SUBMANDIBULAR Right 2018    Performed by Lukasz Arndt MD at [facility] OR 2ND FLR    right submandibular siliathosis removal  2018    removal gland and stones     WISDOM TOOTH EXTRACTION         MEDICATIONS AND ALLERGIES:      Current Outpatient Medications:     citalopram (CELEXA) 40 MG tablet, TAKE 1 TABLET BY MOUTH EVERY DAY, Disp: 90 tablet, Rfl: 0    flu vac [id number] 36mos up,PF, 60 mcg (15 mcg x 4)/0.5 mL Syrg, Inject into the muscle., Disp: 0.5 mL, Rfl: 0    multivitamin (ONE DAILY MULTIVITAMIN) per tablet, Take 1 tablet by mouth once daily., Disp: , Rfl:     norgestimate-ethinyl estradiol (FEMYNOR) 0.25-35 mg-mcg per tablet, Take 1 tablet by mouth once daily., Disp: 84 tablet, Rfl: 3    fluconazole  (DIFLUCAN) 150 MG Tab, Take 1 tablet (150 mg total) by mouth once. REFILL AND RE-DOSE IF SYMPTOMS RECUR for 1 dose, Disp: 1 tablet, Rfl: 1    Review of patient's allergies indicates:   Allergen Reactions    No known drug allergies        Family History   Problem Relation Age of Onset    Hypertension Mother     Diabetes Mother     Glaucoma Father     Cancer Other         ovarian cancer     Breast cancer Neg Hx     Ovarian cancer Neg Hx     Colon cancer Neg Hx        Social History     Socioeconomic History    Marital status: Single     Spouse name: Not on file    Number of children: Not on file    Years of education: Not on file    Highest education level: Not on file   Social Needs    Financial resource strain: Not on file    Food insecurity - worry: Not on file    Food insecurity - inability: Not on file    Transportation needs - medical: Not on file    Transportation needs - non-medical: Not on file   Occupational History    Not on file   Tobacco Use    Smoking status: Never Smoker    Smokeless tobacco: Never Used   Substance and Sexual Activity    Alcohol use: No    Drug use: No    Sexual activity: Yes     Partners: Female     Birth control/protection: OCP   Other Topics Concern    Not on file   Social History Narrative    Not on file       COMPREHENSIVE GYN HISTORY:  PAP History: Denies abnormal Paps.  Infection History: Denies STDs. Denies PID.  Benign History: Denies uterine fibroids. Denies ovarian cysts. Denies endometriosis. Denies other conditions.  Cancer History: Denies cervical cancer. Denies uterine cancer or hyperplasia. Denies ovarian cancer. Denies vulvar cancer or pre-cancer. Denies vaginal cancer or pre-cancer. Denies breast cancer. Denies colon cancer.  Sexual Activity History: Reports currently being sexually active  Menstrual History: Monthly, mild-moderate.  Contraception:oral contraceptives (estrogen/progesterone)    ROS:  GENERAL: No weight changes. No swelling. No  "fatigue. No fever.  CARDIOVASCULAR: No chest pain. No shortness of breath. No leg cramps.   NEUROLOGICAL: No headaches. No vision changes.  BREASTS: No pain. No lumps. No discharge.  ABDOMEN: No pain. No nausea. No vomiting. No diarrhea. No constipation.  REPRODUCTIVE: No abnormal bleeding.   VULVA: No pain. No lesions. No itching.  VAGINA: No relaxation. No itching. No odor. No discharge. No lesions.  URINARY: No incontinence. No nocturia. No frequency. No dysuria.    /70   Ht 4' 11" (1.499 m)   Wt 69 kg (152 lb 1.9 oz)   LMP 01/02/2019   BMI 30.72 kg/m²     PE:  APPEARANCE: Well nourished, well developed, in no acute distress.  AFFECT: WNL, alert and oriented x 3.  SKIN: No acne or hirsutism.  NECK: Neck symmetric, without masses or thyromegaly.  NODES: No inguinal, cervical, axillary or femoral lymph node enlargement.  CHEST: Good respiratory effort.   ABDOMEN: Soft. No tenderness or masses. No hepatosplenomegaly. No hernias.  BREASTS: Symmetrical, no skin changes, visible lesions, palpable masses or nipple discharge bilaterally.  PELVIC: External female genitalia without lesions.  Female hair distribution. Adequate perineal body, Normal urethral meatus. Vagina moist and well rugated without lesions or discharge.  No significant cystocele or rectocele present. Cervix pink without lesions, discharge or tenderness BUT ++ FRIABLE. Uterus is normal size, regular, mobile and nontender. Adnexa without masses or tenderness.  EXTREMITIES: No edema    PROCEDURES:  Pap    DIAGNOSIS:  1. Visit for gynecologic examination  Liquid-based pap smear, screening   2. Venereal disease screening  HIV 1/2 Ag/Ab (4th Gen)    RPR    Hepatitis B surface antigen    C. trachomatis/N. gonorrhoeae by AMP DNA    Vaginosis Screen by DNA Probe   3. Encounter for surveillance of contraceptive pills  norgestimate-ethinyl estradiol (FEMYNOR) 0.25-35 mg-mcg per tablet   4. Cervicitis         LABS AND TESTS ORDERED:    MEDICATIONS " PRESCRIBED:    COUNSELING:   The patient was counseled today on ACS PAP guidelines, with recommendations for yearly pelvic exams unless their uterus, cervix, and ovaries were removed for benign reasons; in that case, examinations every 3-5 years are recommended.  The patient was also counseled regarding monthly breast self-examination, routine STD screening for at-risk populations, prophylactic immunizations for transmitted infections such as  HPV, Pertussis, or Influenza as appropriate, and yearly mammograms when indicated by ACS guidelines.  She was advised to see her primary care physician for all other health maintenance.    FOLLOW-UP with me annually.

## 2019-01-23 ENCOUNTER — LAB VISIT (OUTPATIENT)
Dept: LAB | Facility: HOSPITAL | Age: 29
End: 2019-01-23
Attending: OBSTETRICS & GYNECOLOGY
Payer: COMMERCIAL

## 2019-01-23 DIAGNOSIS — Z11.3 VENEREAL DISEASE SCREENING: ICD-10-CM

## 2019-01-23 LAB
HBV SURFACE AG SERPL QL IA: NEGATIVE
HIV 1+2 AB+HIV1 P24 AG SERPL QL IA: NEGATIVE

## 2019-01-23 PROCEDURE — 36415 COLL VENOUS BLD VENIPUNCTURE: CPT

## 2019-01-23 PROCEDURE — 87340 HEPATITIS B SURFACE AG IA: CPT

## 2019-01-23 PROCEDURE — 86592 SYPHILIS TEST NON-TREP QUAL: CPT

## 2019-01-23 PROCEDURE — 86703 HIV-1/HIV-2 1 RESULT ANTBDY: CPT

## 2019-01-24 LAB — RPR SER QL: NORMAL

## 2019-01-30 ENCOUNTER — OFFICE VISIT (OUTPATIENT)
Dept: PSYCHIATRY | Facility: CLINIC | Age: 29
End: 2019-01-30
Payer: COMMERCIAL

## 2019-01-30 VITALS
BODY MASS INDEX: 31.04 KG/M2 | SYSTOLIC BLOOD PRESSURE: 103 MMHG | HEART RATE: 94 BPM | WEIGHT: 154 LBS | DIASTOLIC BLOOD PRESSURE: 56 MMHG | HEIGHT: 59 IN

## 2019-01-30 DIAGNOSIS — F41.1 GAD (GENERALIZED ANXIETY DISORDER): ICD-10-CM

## 2019-01-30 DIAGNOSIS — F80.81 STUTTERING: ICD-10-CM

## 2019-01-30 DIAGNOSIS — F84.5 ASPERGER SYNDROME: ICD-10-CM

## 2019-01-30 PROCEDURE — 99999 PR PBB SHADOW E&M-EST. PATIENT-LVL II: ICD-10-PCS | Mod: PBBFAC,,, | Performed by: PSYCHIATRY & NEUROLOGY

## 2019-01-30 PROCEDURE — 90833 PR PSYCHOTHERAPY W/PATIENT W/E&M, 30 MIN (ADD ON): ICD-10-PCS | Mod: S$GLB,,, | Performed by: PSYCHIATRY & NEUROLOGY

## 2019-01-30 PROCEDURE — 3008F BODY MASS INDEX DOCD: CPT | Mod: CPTII,S$GLB,, | Performed by: PSYCHIATRY & NEUROLOGY

## 2019-01-30 PROCEDURE — 99213 PR OFFICE/OUTPT VISIT, EST, LEVL III, 20-29 MIN: ICD-10-PCS | Mod: S$GLB,,, | Performed by: PSYCHIATRY & NEUROLOGY

## 2019-01-30 PROCEDURE — 99213 OFFICE O/P EST LOW 20 MIN: CPT | Mod: S$GLB,,, | Performed by: PSYCHIATRY & NEUROLOGY

## 2019-01-30 PROCEDURE — 3008F PR BODY MASS INDEX (BMI) DOCUMENTED: ICD-10-PCS | Mod: CPTII,S$GLB,, | Performed by: PSYCHIATRY & NEUROLOGY

## 2019-01-30 PROCEDURE — 90833 PSYTX W PT W E/M 30 MIN: CPT | Mod: S$GLB,,, | Performed by: PSYCHIATRY & NEUROLOGY

## 2019-01-30 PROCEDURE — 99999 PR PBB SHADOW E&M-EST. PATIENT-LVL II: CPT | Mod: PBBFAC,,, | Performed by: PSYCHIATRY & NEUROLOGY

## 2019-01-30 RX ORDER — CITALOPRAM 40 MG/1
40 TABLET, FILM COATED ORAL DAILY
Qty: 90 TABLET | Refills: 1 | Status: SHIPPED | OUTPATIENT
Start: 2019-01-30 | End: 2019-08-13 | Stop reason: SDUPTHER

## 2019-01-30 NOTE — PROGRESS NOTES
Outpatient Psychiatry Follow-Up Visit (MD/NP)    1/30/2019    Clinical Status of Patient:  Outpatient (Ambulatory)    Chief Complaint:  Tammy Bonner is a 28 y.o. female who presents today for follow-up of anxiety primarily, hx of low mood, stuttering, asperger's associated issues        Met with patient.      Interval History and Content of Current Session:  Interim Events/Subjective Report/Content of Current Session:       There were changes made at her work and her place of work shut down and she was laid off.  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  So far it has been mostly good.  She reports mostly mood is good.  She will put herself down when she has made a mistake.  Takes measures to relax after.  These episodes are typically premenstrual.  She does not otherwise endorse worry.  She reports she is seldom otherwise anxious.  Sometimes will feel anxious when dealing with a challenging customer.  Denies desire for death in quite some time.  Reports things are good at home.  Reports sleeping well.  She is exercising regularly, doing Jazzercise.      Reports relationship is going well and does not elaborate when asked about STD sceening.        Psychotherapy:  · Target symptoms: mood swings, impulsivity, SI  · Why chosen therapy is appropriate versus another modality: relevant to diagnosis, patient responds to this modality  · Outcome monitoring methods: self-report, observation  · Therapeutic intervention type: supportive psychotherapy  · Topics discussed/themes: work stress, difficulty managing affect in interpersonal relationships, building skills sets for symptom management, symptom recognition.    · The patient's response to the intervention is accepting. The patient's progress toward treatment goals is good.   · Duration of intervention: 16 mins    Review of Systems   · PSYCHIATRIC:  "Pertinant items are noted in the narrative.    Past Medical, Family and Social History: The patient's past medical, family and social history have been reviewed and updated as appropriate within the electronic medical record - see encounter notes.    Compliance: yes    Side effects: None    Risk Parameters:  Patient reports no suicidal ideation  Patient reports no homicidal ideation  Patient reports no self-injurious behavior  Patient reports no violent behavior    Exam (detailed: at least 9 elements; comprehensive: all 15 elements)   Constitutional  Vitals: 1 Most recent vital signs, dated less than 90 days prior to this appointment, were reviewed.    Vitals:    01/30/19 0840   BP: (!) 103/56   BP Location: Left arm   Patient Position: Sitting   BP Method: Large (Automatic)   Pulse: 94   Weight: 69.9 kg (154 lb)   Height: 4' 11" (1.499 m)        General:  age appropriate, well dressed, neatly groomed, overweight     Musculoskeletal  Muscle Strength/Tone:  no tremor   Gait & Station:  normal, no ataxia     Psychiatric  Speech:  clearly audible, quite verbal, some mild studder, at times rapid   Mood:   Affect:  euthymic  congruent and appropriate   Thought Process:  logical   Associations:  intact   Thought Content:  normal, no suicidality, no homicidality, delusions, or paranoia   Insight:   Judgement:  fair  adequate to circumstances   Orientation:  grossly intact   Memory: intact for content of interview   Language: grossly intact   Attention Span & Concentration:  able to focus   Fund of Knowledge:  intact and appropriate to age and level of education     Assessment and Diagnosis   Status/Progress: Based on the examination today, the patient's problem(s) is/are adequately but not ideally controlled.  New problems have been presented today.   Comorbidities are complicating management of the primary condition:Asperger's complicating her DORIS.  There are no active rule-out diagnoses for this patient at this " time.    General Impression: 28 y.o. F with Asperger's and DORIS.  DORIS relatively well controlled currently.   Has some problems with anger at times, typically with parents.      Diagnosis:  Asperger's Disorder (299.80)  Generalized Anxiety Disorder (300.02).         Intervention/Counseling/Treatment Plan   · Continue current medication, citalopram 40mg daily.        Return to Clinic: 6 months

## 2019-02-02 ENCOUNTER — PATIENT MESSAGE (OUTPATIENT)
Dept: OBSTETRICS AND GYNECOLOGY | Facility: CLINIC | Age: 29
End: 2019-02-02

## 2019-02-14 ENCOUNTER — HOSPITAL ENCOUNTER (OUTPATIENT)
Dept: RADIOLOGY | Facility: HOSPITAL | Age: 29
Discharge: HOME | End: 2019-02-14
Attending: NURSE PRACTITIONER
Payer: COMMERCIAL

## 2019-02-14 ENCOUNTER — OFFICE VISIT (OUTPATIENT)
Dept: SURGERY | Facility: CLINIC | Age: 29
End: 2019-02-14
Payer: COMMERCIAL

## 2019-02-14 VITALS
TEMPERATURE: 99 F | SYSTOLIC BLOOD PRESSURE: 107 MMHG | BODY MASS INDEX: 31.28 KG/M2 | HEART RATE: 91 BPM | DIASTOLIC BLOOD PRESSURE: 68 MMHG | WEIGHT: 155.13 LBS | HEIGHT: 59 IN

## 2019-02-14 DIAGNOSIS — N63.0 BREAST LUMP: ICD-10-CM

## 2019-02-14 DIAGNOSIS — R92.8 ABNORMAL ULTRASOUND OF BREAST: Primary | ICD-10-CM

## 2019-02-14 DIAGNOSIS — Z12.39 BREAST CANCER SCREENING: Primary | ICD-10-CM

## 2019-02-14 PROCEDURE — 3008F BODY MASS INDEX DOCD: CPT | Mod: CPTII,S$GLB,, | Performed by: NURSE PRACTITIONER

## 2019-02-14 PROCEDURE — 76642 ULTRASOUND BREAST LIMITED: CPT | Mod: TC,PO,LT

## 2019-02-14 PROCEDURE — 99213 OFFICE O/P EST LOW 20 MIN: CPT | Mod: S$GLB,,, | Performed by: NURSE PRACTITIONER

## 2019-02-14 PROCEDURE — 99999 PR PBB SHADOW E&M-EST. PATIENT-LVL III: CPT | Mod: PBBFAC,,, | Performed by: NURSE PRACTITIONER

## 2019-02-14 PROCEDURE — 3008F PR BODY MASS INDEX (BMI) DOCUMENTED: ICD-10-PCS | Mod: CPTII,S$GLB,, | Performed by: NURSE PRACTITIONER

## 2019-02-14 PROCEDURE — 99999 PR PBB SHADOW E&M-EST. PATIENT-LVL III: ICD-10-PCS | Mod: PBBFAC,,, | Performed by: NURSE PRACTITIONER

## 2019-02-14 PROCEDURE — 99213 PR OFFICE/OUTPT VISIT, EST, LEVL III, 20-29 MIN: ICD-10-PCS | Mod: S$GLB,,, | Performed by: NURSE PRACTITIONER

## 2019-02-14 PROCEDURE — 76642 ULTRASOUND BREAST LIMITED: CPT | Mod: 26,LT,, | Performed by: RADIOLOGY

## 2019-02-14 PROCEDURE — 76642 US BREAST LEFT LIMITED: ICD-10-PCS | Mod: 26,LT,, | Performed by: RADIOLOGY

## 2019-02-14 NOTE — PROGRESS NOTES
Notified pt of results.    Hedy, pt needs to RTC in 3 months for CBE; please contact her to schedule.  Also needs f/u imaging (left breast limited US) in 6 months; order placed.    Thanks,  Kamron

## 2019-02-14 NOTE — PROGRESS NOTES
"Patient ID: Tammy Bonner is a 28 y.o. female.    Chief Complaint: Breast Cancer Screening (CBE)        HPI:  Established patient of the Department of Breast Surgery, previously seen by SAMRA Charles, and new to me, presents today for breast cancer screening.    Breast History:    Hx of "8mm oval mobile nontender mass left breast at 3 o'clock, similar 1.0cm mass right breast at 3 o'clock", documented at last clinic visit w/ Yue 1/10/18 as "Stable breast masses consistent with benign FA"    Personal history of breast cancer:  No    7/2014 right breast mass with path indicating FA, s/p excision w/ Dr. Chávez    Interval Breast History    Patient reports right breast lump to 3:00 region.  Pea-sized.  First noticed a couple years ago.  Not increasing in size or changing in any way.  Not associated with any pain or skin changes.    Patient reports left breast lump to LIQ.  Linear, about 1 inch in largest diameter.  First noticed a couple years ago.  Not increasing in size or changing in any way.  Not associated with any pain or skin changes.    Patient denies breast pain, breast-related skin changes, nipple discharge and nipple retraction.  Patient denies breast lump other than abovementioned.    Breast Imaging    Last bilat breast US 12/30/14 report reviewed:  Finding 1:  Follow-up examination was performed for the solid mass in the right  breast seen on 06/26/2012.  On the present examination, there is a stable oval  elongated solid mass with well defined, thin margins measuring 18 millimeters in  the anterior region of the right breast at 3 o'clock.  Internal echotexture is  hypoechoic.  Finding 2:  There is a Round solid mass with well defined, thin margins seen in  the left breast at 3 o'clock, which parallels the skin line.  Internal  echotexture is homogeneous and hypoechoic.  Mass is avascular.  This finding is consistent with fibroadenoma.  It measures 1.3 cm x 6 mm x 8 mm.   Impression   "   Finding 1:  Stable solid mass in the right breast is benign-negative.  Finding 2:  Solid mass in the left breast is probably benign.  Follow-up in 6  months is recommended.  ACR BI-RADS Category 3: Probably Benign     Left breast ltd US 7/1/15 report reviewed:  There is a stable oval solid mass with well defined, thin margins measuring 13  millimeters seen in the left breast at 3 o'clock, lateral located 4 centimeters  from the nipple., which parallels the skin line.  Internal echotexture is  hypoechoic.  The finding is soft on elastography.  There is no effect on  posterior tissue.  Mass is avascular.  Finding remains unchanged from the prior  study.  Findings and recommendation were discussed with patient at the time of the study.   Impression     Stable solid mass in the left breast is probably benign.  Follow-up in 1 year is  recommended.  ACR BI-RADS Category 3: Probably Benign     Last left breast ltd US 16 report reviewed:  There is an oval solid mass with well defined, thin margins measuring [id number]  seen in the middle region of the left breast at 3 o'clock.  The mass has not significantly changed from the prior exams, consistent with a  fibroadenoma.   Impression     Solid mass in the left breast is benign-negative.  Findings consistent with a fibroadenoma.  Mammogram at age 41 is recommended.  ACR BI-RADS Category 2: Benign       GYN History:  Age of menarche:  13 y/o  LMP:  20  HRT usage:  never, on OCP      Other Oncologic History:  Personal history of other cancer not abovementioned:  no  Personal history of genetic testing:  no    Social History:  Never smoker  Alcohol use:  Very rare  Exercise:  2-3x/week    Family Oncologic History:    Ashken[employer] ancestry:  no    Family History   Problem Relation Age of Onset    Ovarian cancer Other         mat GGM    Breast cancer Neg Hx      History of genetic testing in relatives:  no      Patient's Breast Cancer Risk Assessment  Scores:  Taking into account the above information, the patient's breast cancer risk assessment scores were calculated today as follows:  Kathia lifetime risk:  14.4%        Review of Systems - See HPI.  Objective:   Physical Exam   Pulmonary/Chest: Effort normal. No respiratory distress. She exhibits no mass, no edema and no deformity. Right breast exhibits mass. Right breast exhibits no inverted nipple, no nipple discharge, no skin change and no tenderness. Left breast exhibits mass and tenderness. Left breast exhibits no inverted nipple, no nipple discharge and no skin change. Breasts are symmetrical. There is no breast swelling.   -Right breast 9:00-10:00 with well healed scar with no associated mass appreciated.  -Right breast 3:00 subareolar region with 0.75cm nodule, round, mobile, smooth, nontender.  Pt palpates this lump and states it has been present for years and is unchanged.  Smaller in size than as last reported by previous FNP.  Very low clinical suspicion.  -Left breast 6:00-7:00 region with pt-reported lump that is appreciated as denser breast tissue by my exam today.  Feels only slightly asymmetrical as compared to corresponding contralateral region.  Nontender.  Marked for imaging.  Very low clinical suspicion.  -Left breast 2:00 region, roughly 4-5cmfn, with lump, round, mildly TTP, possible FA vs dense breast tissue.  Marked for imaging.  Very low clinical suspicion.   Lymphadenopathy:     She has no cervical adenopathy.     She has no axillary adenopathy.        Right: No supraclavicular adenopathy present.        Left: No supraclavicular adenopathy present.     Assessment:       1. Breast cancer screening    2. Breast lump          Plan:     Left breast ltd US today.  If benign/negative, pt to RTC in 3 months for CBE, as she desired close clinical f/u for right breast nodule, which is of very low clinical suspicion given its history.    Counseled pt on breast cancer risk-reduction  strategies.    Encouraged breast awareness, including monthly breast self-exams.  Advised patient to RTC with any interval changes or concerns.  Questions were encouraged and answered to patient's satisfaction, and patient verbalized understanding of information and agreement with the plan.

## 2019-02-18 ENCOUNTER — TELEPHONE (OUTPATIENT)
Dept: SURGERY | Facility: CLINIC | Age: 29
End: 2019-02-18

## 2019-02-18 NOTE — TELEPHONE ENCOUNTER
Contacted the patient regarding scheduling 3 month follow up breast exam per Kamron Talavera NP.  The patient did not answer, message left for the patient to contact our office regarding this matter.  Kamron Talavera NP notified of this matter.

## 2019-02-18 NOTE — TELEPHONE ENCOUNTER
----- Message from Kamron Talavera DNP sent at 2/14/2019  5:11 PM CST -----  Notified pt of results.    Hedy, pt needs to RTC in 3 months for CBE; please contact her to schedule.  Also needs f/u imaging (left breast limited US) in 6 months; order placed.    Thanks,  Kamron

## 2019-02-18 NOTE — TELEPHONE ENCOUNTER
Returned  Call to Ms.Kailey for a follow up apt with Kamron Talavera in May for a Right Breast Nodule. Message left for Ms. Bonner to please call  Elinor chapman @ [phone] . I would be happy to book this apt .

## 2019-02-18 NOTE — TELEPHONE ENCOUNTER
Returning call to Ms.Kailey regarding needing a follow up 3 month  apt with Kamron Talavera. Booked apt on 5/23/19 @ 9:30am I informed  patient of date & time of her apt mailed out apt slip

## 2019-05-23 ENCOUNTER — HOSPITAL ENCOUNTER (OUTPATIENT)
Dept: RADIOLOGY | Facility: HOSPITAL | Age: 29
Discharge: HOME | End: 2019-05-23
Attending: NURSE PRACTITIONER
Payer: COMMERCIAL

## 2019-05-23 ENCOUNTER — OFFICE VISIT (OUTPATIENT)
Dept: SURGERY | Facility: CLINIC | Age: 29
End: 2019-05-23
Payer: COMMERCIAL

## 2019-05-23 VITALS
TEMPERATURE: 98 F | HEART RATE: 106 BPM | HEIGHT: 59 IN | DIASTOLIC BLOOD PRESSURE: 73 MMHG | WEIGHT: 149 LBS | BODY MASS INDEX: 30.04 KG/M2 | SYSTOLIC BLOOD PRESSURE: 108 MMHG

## 2019-05-23 DIAGNOSIS — R92.8 ABNORMAL FINDING ON BREAST IMAGING: Primary | ICD-10-CM

## 2019-05-23 DIAGNOSIS — N63.10 BILATERAL BREAST LUMP: ICD-10-CM

## 2019-05-23 DIAGNOSIS — N63.20 BILATERAL BREAST LUMP: Primary | ICD-10-CM

## 2019-05-23 DIAGNOSIS — Z80.41 FAMILY HISTORY OF OVARIAN CANCER: ICD-10-CM

## 2019-05-23 DIAGNOSIS — N63.20 BILATERAL BREAST LUMP: ICD-10-CM

## 2019-05-23 DIAGNOSIS — N63.10 BILATERAL BREAST LUMP: Primary | ICD-10-CM

## 2019-05-23 DIAGNOSIS — R92.8 ABNORMAL ULTRASOUND OF BREAST: ICD-10-CM

## 2019-05-23 PROCEDURE — 3008F PR BODY MASS INDEX (BMI) DOCUMENTED: ICD-10-PCS | Mod: CPTII,S$GLB,, | Performed by: NURSE PRACTITIONER

## 2019-05-23 PROCEDURE — 76642 US BREAST BILATERAL LIMITED: ICD-10-PCS | Mod: 26,,, | Performed by: RADIOLOGY

## 2019-05-23 PROCEDURE — 3008F BODY MASS INDEX DOCD: CPT | Mod: CPTII,S$GLB,, | Performed by: NURSE PRACTITIONER

## 2019-05-23 PROCEDURE — 99213 OFFICE O/P EST LOW 20 MIN: CPT | Mod: S$GLB,,, | Performed by: NURSE PRACTITIONER

## 2019-05-23 PROCEDURE — 99999 PR PBB SHADOW E&M-EST. PATIENT-LVL III: ICD-10-PCS | Mod: PBBFAC,,, | Performed by: NURSE PRACTITIONER

## 2019-05-23 PROCEDURE — 99213 PR OFFICE/OUTPT VISIT, EST, LEVL III, 20-29 MIN: ICD-10-PCS | Mod: S$GLB,,, | Performed by: NURSE PRACTITIONER

## 2019-05-23 PROCEDURE — 76642 ULTRASOUND BREAST LIMITED: CPT | Mod: TC,50,PO

## 2019-05-23 PROCEDURE — 76642 ULTRASOUND BREAST LIMITED: CPT | Mod: 26,,, | Performed by: RADIOLOGY

## 2019-05-23 PROCEDURE — 99999 PR PBB SHADOW E&M-EST. PATIENT-LVL III: CPT | Mod: PBBFAC,,, | Performed by: NURSE PRACTITIONER

## 2019-05-23 NOTE — PROGRESS NOTES
"Patient ID: Tammy Bonner is a 29 y.o. female.    Chief Complaint: Follow-up (Follow up CBE .)        HPI:  Established patient presents today for follow-up.  I last saw in her in clinic on 2/14/19.    Breast History:     Hx of "8mm oval mobile nontender mass left breast at 3 o'clock, similar 1.0cm mass right breast at 3 o'clock", documented at clinic visit w/ Yue 1/10/18 as "Stable breast masses consistent with benign FA"     Personal history of breast cancer:  No     7/2014 right breast mass with path indicating FA, s/p excision w/ Dr. Chávez     Breast Imaging     Last bilat breast US 12/30/14 report reviewed:       Finding 1:  Follow-up examination was performed for the solid mass in the right  breast seen on 06/26/2012.  On the present examination, there is a stable oval  elongated solid mass with well defined, thin margins measuring 18 millimeters in  the anterior region of the right breast at 3 o'clock.  Internal echotexture is  hypoechoic.  Finding 2:  There is a Round solid mass with well defined, thin margins seen in  the left breast at 3 o'clock, which parallels the skin line.  Internal  echotexture is homogeneous and hypoechoic.  Mass is avascular.  This finding is consistent with fibroadenoma.  It measures 1.3 cm x 6 mm x 8 mm.   Impression       Finding 1:  Stable solid mass in the right breast is benign-negative.  Finding 2:  Solid mass in the left breast is probably benign.  Follow-up in 6  months is recommended.  ACR BI-RADS Category 3: Probably Benign      Left breast ltd US 7/1/15 report reviewed:       There is a stable oval solid mass with well defined, thin margins measuring 13  millimeters seen in the left breast at 3 o'clock, lateral located 4 centimeters  from the nipple., which parallels the skin line.  Internal echotexture is  hypoechoic.  The finding is soft on elastography.  There is no effect on  posterior tissue.  Mass is avascular.  Finding remains unchanged from the " prior  study.  Findings and recommendation were discussed with patient at the time of the study.   Impression       Stable solid mass in the left breast is probably benign.  Follow-up in 1 year is  recommended.  ACR BI-RADS Category 3: Probably Benign      Left breast ltd US 1/6/16 report reviewed:       There is an oval solid mass with well defined, thin margins measuring [id number]  seen in the middle region of the left breast at 3 o'clock.  The mass has not significantly changed from the prior exams, consistent with a  fibroadenoma.   Impression       Solid mass in the left breast is benign-negative.  Findings consistent with a fibroadenoma.  Mammogram at age 41 is recommended.  ACR BI-RADS Category 2: Benign      Left breast ltd US 2/14/19 report reviewed:  Findings:   Finding 1: There is a 21 mm oval, parallel, hypoechoic mass with circumscribed margins seen in the left breast at 6 o'clock in the anterior depth. This is new and is palpable.    Finding 2: There is a 13 mm oval, parallel, hypoechoic mass seen in the left breast at 2 o'clock in the middle depth. This is unchanged when compared to multiple prior studies dating back to 12/30/14.   The patient has a history of previous fibroadenomas (including a right breast fibroadenoma that was excised in 2014).  Given this history and the multiplicity and similarity of the findings, imaging follow up for the new palpable lesion at 6:00 on the left is recommended.  Findings and recommendation were discussed with the patient at the time of the procedure. The patient was instructed to return sooner if there is a detrimental change clinically.    Impression:  Left  Mass: Left breast 21 mm mass at the anterior 6 o'clock position. Assessment: 3 - Probably benign. Short Interval Follow-Up in 6 Months is recommended.    BI-RADS Category:   Left: 3 - Probably Benign  Overall: 3 - Probably Benign   Recommendation:   Short interval follow-up left breast ultrasound for finding  1 (6:00 anterior) is recommended in 6 Months.   The patient's estimated lifetime risk of breast cancer (to age 85) based on Tyrer-Cuzick - 7 risk assessment model is: Tyrer-Cuzick: 14.4 %.     Interval Breast History    Pt reports L breast lump in 6oc region is unchanged.     Pt reports she no longer feels the lump in the L breast 3oc region.    Pt reports R breast lump in 3oc region is unchanged.     Patient denies breast pain, breast-related skin changes, nipple discharge and nipple retraction.  Patient denies breast lump other than abovementioned.  No other breast concerns.       GYN History:  Age of menarche:  13 y/o    LMP:  20  HRT usage:  never, on OCP     Other Oncologic History:  Personal history of other cancer not abovementioned:  no  Personal history of genetic testing:  no     Social History:  Never smoker  Alcohol use:  Very rare  Exercise:  2-3x/week     Family Oncologic History:     Ashkenazi Sabianist ancestry:  no  History of genetic testing in relatives:  no    Family History   Problem Relation Age of Onset    Ovarian cancer Other         mat GGM    Breast cancer Neg Hx          Review of Systems - See HPI.  Objective:   Physical Exam   Pulmonary/Chest: Effort normal. No respiratory distress. She exhibits no mass, no edema and no deformity. Right breast exhibits mass and tenderness. Right breast exhibits no inverted nipple, no nipple discharge and no skin change. Left breast exhibits mass and tenderness. Left breast exhibits no inverted nipple, no nipple discharge and no skin change. No breast swelling.   R breast 9-10oc with well healed scar with no associated mass appreciated.  R breast 3oc subareolar region with 1.0cm (increased from 0.75cm at last visit) nodule, mobile, smooth, TTP.  L breast 6-7oc region just inferior to areola with 3.0x1.0cm (increased in size from 21mm reported on most recent US) linear lump, mobile, smooth, TTP.  L breast 2-3oc region about 4cmfn with 1.5x0.6cm  (increased in size from 13mm reported on most recent US) lump, mobile, smooth, TTP.  All 3 of abovementioned lumps were marked for imaging.   Genitourinary: No breast swelling.   Lymphadenopathy:     She has no cervical adenopathy.     She has no axillary adenopathy.        Right: No supraclavicular adenopathy present.        Left: No supraclavicular adenopathy present.     Assessment:       1. Bilateral breast lump    2. Abnormal ultrasound of breast    3. Family history of ovarian cancer          Plan:     All 3 of pt's palpable breast lumps clinically feel to have increased in size as compared to my last CBE or to last breast US.  Bilat breast ltd US today (R 3oc, L 6-7oc, L 2-3oc).  Pt's undergoing L US today will likely push back next BI-RADS 3 short-interval f/u to around 11/23/19 (unless otherwise indicated per imaging today).    Plan for pt to RTC in 6 mos for CBE.    Encouraged breast awareness, including monthly breast self-exams.  Advised patient to RTC with any interval changes or concerns.  Questions were encouraged and answered to patient's satisfaction, and patient verbalized understanding of information and agreement with the plan.

## 2019-05-23 NOTE — PROGRESS NOTES
Nas:  Pt needs to return in 6 months for short-term f/u imaging of the L breast (ultrasound).    Thanks,  Kamron

## 2019-07-09 ENCOUNTER — LAB VISIT (OUTPATIENT)
Dept: LAB | Facility: HOSPITAL | Age: 29
End: 2019-07-09
Attending: INTERNAL MEDICINE
Payer: COMMERCIAL

## 2019-07-09 ENCOUNTER — TELEPHONE (OUTPATIENT)
Dept: INTERNAL MEDICINE | Facility: CLINIC | Age: 29
End: 2019-07-09

## 2019-07-09 ENCOUNTER — OFFICE VISIT (OUTPATIENT)
Dept: INTERNAL MEDICINE | Facility: CLINIC | Age: 29
End: 2019-07-09
Payer: COMMERCIAL

## 2019-07-09 VITALS
HEIGHT: 59 IN | WEIGHT: 152.13 LBS | DIASTOLIC BLOOD PRESSURE: 62 MMHG | HEART RATE: 78 BPM | SYSTOLIC BLOOD PRESSURE: 110 MMHG | BODY MASS INDEX: 30.67 KG/M2

## 2019-07-09 DIAGNOSIS — Z00.00 ANNUAL PHYSICAL EXAM: ICD-10-CM

## 2019-07-09 DIAGNOSIS — Z00.00 ANNUAL PHYSICAL EXAM: Primary | ICD-10-CM

## 2019-07-09 LAB
ALBUMIN SERPL BCP-MCNC: 3.8 G/DL (ref 3.5–5.2)
ALP SERPL-CCNC: 67 U/L (ref 55–135)
ALT SERPL W/O P-5'-P-CCNC: 14 U/L (ref 10–44)
ANION GAP SERPL CALC-SCNC: 10 MMOL/L (ref 8–16)
AST SERPL-CCNC: 16 U/L (ref 10–40)
BASOPHILS # BLD AUTO: 0.02 K/UL (ref 0–0.2)
BASOPHILS NFR BLD: 0.3 % (ref 0–1.9)
BILIRUB DIRECT SERPL-MCNC: 0.2 MG/DL (ref 0.1–0.3)
BILIRUB SERPL-MCNC: 0.4 MG/DL (ref 0.1–1)
BUN SERPL-MCNC: 13 MG/DL (ref 6–20)
CALCIUM SERPL-MCNC: 9.9 MG/DL (ref 8.7–10.5)
CHLORIDE SERPL-SCNC: 104 MMOL/L (ref 95–110)
CHOLEST SERPL-MCNC: 203 MG/DL (ref 120–199)
CHOLEST/HDLC SERPL: 2.6 {RATIO} (ref 2–5)
CO2 SERPL-SCNC: 27 MMOL/L (ref 23–29)
CREAT SERPL-MCNC: 0.9 MG/DL (ref 0.5–1.4)
DIFFERENTIAL METHOD: ABNORMAL
EOSINOPHIL # BLD AUTO: 0.2 K/UL (ref 0–0.5)
EOSINOPHIL NFR BLD: 2.3 % (ref 0–8)
ERYTHROCYTE [DISTWIDTH] IN BLOOD BY AUTOMATED COUNT: 12.9 % (ref 11.5–14.5)
EST. GFR  (AFRICAN AMERICAN): >60 ML/MIN/1.73 M^2
EST. GFR  (NON AFRICAN AMERICAN): >60 ML/MIN/1.73 M^2
FERRITIN SERPL-MCNC: 54 NG/ML (ref 20–300)
GLUCOSE SERPL-MCNC: 98 MG/DL (ref 70–110)
HCT VFR BLD AUTO: 39 % (ref 37–48.5)
HDLC SERPL-MCNC: 78 MG/DL (ref 40–75)
HDLC SERPL: 38.4 % (ref 20–50)
HGB BLD-MCNC: 13.3 G/DL (ref 12–16)
IRON SERPL-MCNC: 84 UG/DL (ref 30–160)
LDLC SERPL CALC-MCNC: 93.6 MG/DL (ref 63–159)
LYMPHOCYTES # BLD AUTO: 2 K/UL (ref 1–4.8)
LYMPHOCYTES NFR BLD: 26 % (ref 18–48)
MCH RBC QN AUTO: 30 PG (ref 27–31)
MCHC RBC AUTO-ENTMCNC: 34.1 G/DL (ref 32–36)
MCV RBC AUTO: 88 FL (ref 82–98)
MONOCYTES # BLD AUTO: 0.7 K/UL (ref 0.3–1)
MONOCYTES NFR BLD: 8.8 % (ref 4–15)
NEUTROPHILS # BLD AUTO: 4.8 K/UL (ref 1.8–7.7)
NEUTROPHILS NFR BLD: 62.6 % (ref 38–73)
NONHDLC SERPL-MCNC: 125 MG/DL
PLATELET # BLD AUTO: 341 K/UL (ref 150–350)
PMV BLD AUTO: 8.9 FL (ref 9.2–12.9)
POTASSIUM SERPL-SCNC: 4.5 MMOL/L (ref 3.5–5.1)
PROT SERPL-MCNC: 7.6 G/DL (ref 6–8.4)
RBC # BLD AUTO: 4.43 M/UL (ref 4–5.4)
SATURATED IRON: 15 % (ref 20–50)
SODIUM SERPL-SCNC: 141 MMOL/L (ref 136–145)
TOTAL IRON BINDING CAPACITY: 562 UG/DL (ref 250–450)
TRANSFERRIN SERPL-MCNC: 380 MG/DL (ref 200–375)
TRIGL SERPL-MCNC: 157 MG/DL (ref 30–150)
TSH SERPL DL<=0.005 MIU/L-ACNC: 1.72 UIU/ML (ref 0.4–4)
WBC # BLD AUTO: 7.73 K/UL (ref 3.9–12.7)

## 2019-07-09 PROCEDURE — 80061 LIPID PANEL: CPT

## 2019-07-09 PROCEDURE — 99395 PREV VISIT EST AGE 18-39: CPT | Mod: S$GLB,,, | Performed by: INTERNAL MEDICINE

## 2019-07-09 PROCEDURE — 80048 BASIC METABOLIC PNL TOTAL CA: CPT

## 2019-07-09 PROCEDURE — 84443 ASSAY THYROID STIM HORMONE: CPT

## 2019-07-09 PROCEDURE — 99999 PR PBB SHADOW E&M-EST. PATIENT-LVL III: CPT | Mod: PBBFAC,,, | Performed by: INTERNAL MEDICINE

## 2019-07-09 PROCEDURE — 82728 ASSAY OF FERRITIN: CPT

## 2019-07-09 PROCEDURE — 83540 ASSAY OF IRON: CPT

## 2019-07-09 PROCEDURE — 85025 COMPLETE CBC W/AUTO DIFF WBC: CPT

## 2019-07-09 PROCEDURE — 80076 HEPATIC FUNCTION PANEL: CPT

## 2019-07-09 PROCEDURE — 36415 COLL VENOUS BLD VENIPUNCTURE: CPT

## 2019-07-09 PROCEDURE — 99999 PR PBB SHADOW E&M-EST. PATIENT-LVL III: ICD-10-PCS | Mod: PBBFAC,,, | Performed by: INTERNAL MEDICINE

## 2019-07-09 PROCEDURE — 99395 PR PREVENTIVE VISIT,EST,18-39: ICD-10-PCS | Mod: S$GLB,,, | Performed by: INTERNAL MEDICINE

## 2019-07-09 NOTE — PROGRESS NOTES
"Subjective:       Patient ID: Tammy Bonner is a 29 y.o. female.    Chief Complaint: Annual Exam   This is a 29-year-old who presents today for physical.  She reports has been doing well continues to try to stay active with exercise in usually goes to jazzercise with the gym.   She may not be going as much as she was in the past.  Her weight is trending back up slightly. She may not be going as much as she was in the past. She is up-to-date on her Pap and pelvic in recently saw her gynecologist reprots was treated for yeast infection at that time  Has avoided scented bath products since then with no recurrance   She has also been following with breast Center for her history of fibroadenoma and breast cysts.  She continues to take a multivitamin with iron.  She continues to follow with her psychiatrist and remains on citalopram daily anxiety stable she feels at this time.  She has been working [employer] and recent promotion. She seems to be doing well with her job.       HPI  Review of Systems   Constitutional: Negative for fever.   Respiratory: Negative for cough, shortness of breath and wheezing.    Cardiovascular: Negative for chest pain and palpitations.   Gastrointestinal: Negative for abdominal pain and constipation.   Neurological: Negative for dizziness.       Objective:     Blood pressure 110/62, pulse 78, height 4' 11" (1.499 m), weight 69 kg (152 lb 1.9 oz).    Physical Exam   Constitutional: No distress.   HENT:   Head: Normocephalic.   Mouth/Throat: Oropharynx is clear and moist.   Eyes: No scleral icterus.   Neck: Neck supple.   Cardiovascular: Normal rate, regular rhythm and normal heart sounds. Exam reveals no gallop and no friction rub.   No murmur heard.  Pulmonary/Chest: Effort normal and breath sounds normal. No respiratory distress.   Surgical scar right breast  Mass /cyst bilateral no tenderness     Abdominal: Soft. Bowel sounds are normal. She exhibits no mass. There is no tenderness. "   Musculoskeletal: She exhibits no edema.   Neurological: She is alert.   Skin: No erythema.   Psychiatric: She has a normal mood and affect.   Vitals reviewed.      Assessment:       1. Annual physical exam        Plan:       Tammy was seen today for annual exam.    Diagnoses and all orders for this visit:    Annual physical exam  -     Basic metabolic panel; Future  -     CBC auto differential; Future  -     Hepatic function panel; Future  -     Lipid panel; Future  -     TSH; Future  -     Ferritin; Future  -     Iron and TIBC; Future    Hx fiboradenoma and breast cyst hx   She continues to follow with the breast center for surveillance had recent ultrasound     She is up-to-date on her gyn appointment she will schedule her annual eye exam when due.    Hx  Anxiety mood swings , asperger syndrome she  Continues to follow with Psychiatry remains on citalopram    Labs and review    Follow up annually

## 2019-07-15 ENCOUNTER — PATIENT MESSAGE (OUTPATIENT)
Dept: INTERNAL MEDICINE | Facility: CLINIC | Age: 29
End: 2019-07-15

## 2019-08-13 ENCOUNTER — OFFICE VISIT (OUTPATIENT)
Dept: PSYCHIATRY | Facility: CLINIC | Age: 29
End: 2019-08-13
Payer: COMMERCIAL

## 2019-08-13 VITALS
DIASTOLIC BLOOD PRESSURE: 60 MMHG | WEIGHT: 157.5 LBS | SYSTOLIC BLOOD PRESSURE: 116 MMHG | BODY MASS INDEX: 31.75 KG/M2 | HEART RATE: 94 BPM | HEIGHT: 59 IN

## 2019-08-13 DIAGNOSIS — F84.5 ASPERGER SYNDROME: ICD-10-CM

## 2019-08-13 DIAGNOSIS — F41.1 GAD (GENERALIZED ANXIETY DISORDER): ICD-10-CM

## 2019-08-13 PROCEDURE — 99999 PR PBB SHADOW E&M-EST. PATIENT-LVL II: CPT | Mod: PBBFAC,,, | Performed by: PSYCHIATRY & NEUROLOGY

## 2019-08-13 PROCEDURE — 99214 OFFICE O/P EST MOD 30 MIN: CPT | Mod: S$GLB,,, | Performed by: PSYCHIATRY & NEUROLOGY

## 2019-08-13 PROCEDURE — 3008F BODY MASS INDEX DOCD: CPT | Mod: CPTII,S$GLB,, | Performed by: PSYCHIATRY & NEUROLOGY

## 2019-08-13 PROCEDURE — 99214 PR OFFICE/OUTPT VISIT, EST, LEVL IV, 30-39 MIN: ICD-10-PCS | Mod: S$GLB,,, | Performed by: PSYCHIATRY & NEUROLOGY

## 2019-08-13 PROCEDURE — 99999 PR PBB SHADOW E&M-EST. PATIENT-LVL II: ICD-10-PCS | Mod: PBBFAC,,, | Performed by: PSYCHIATRY & NEUROLOGY

## 2019-08-13 PROCEDURE — 3008F PR BODY MASS INDEX (BMI) DOCUMENTED: ICD-10-PCS | Mod: CPTII,S$GLB,, | Performed by: PSYCHIATRY & NEUROLOGY

## 2019-08-13 RX ORDER — CITALOPRAM 40 MG/1
40 TABLET, FILM COATED ORAL DAILY
Qty: 90 TABLET | Refills: 1 | Status: SHIPPED | OUTPATIENT
Start: 2019-08-13 | End: 2020-03-04 | Stop reason: ALTCHOICE

## 2019-08-13 NOTE — PROGRESS NOTES
Outpatient Psychiatry Follow-Up Visit (MD/NP)    8/13/2019    Clinical Status of Patient:  Outpatient (Ambulatory)    Chief Complaint:  Tammy Bonner is a 29 y.o. female who presents today for follow-up of anxiety primarily, hx of low mood, stuttering, asperger's associated issues        Met with patient.      Interval History and Content of Current Session:  Interim Events/Subjective Report/Content of Current Session:    The patient reports that she has generally been well.  She reports she feels pretty stable except for some days, typically premenstrually, where she feels negative about herself particularly if she has made a mistake at work.  Generally these feelings last no longer than 1 day.  She reports she has not been losing her temper.  She reports sleeping well.  She sometimes has trouble waking up.  She denies anhedonia and recently went with her fiance in future in-laws to [place] for the weekend.  She continues to attend jazz or size twice a week.    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  She gets along well with her coworkers.      Psychotherapy:  · Target symptoms: mood swings, impulsivity, SI  · Why chosen therapy is appropriate versus another modality: relevant to diagnosis, patient responds to this modality  · Outcome monitoring methods: self-report, observation  · Therapeutic intervention type: supportive psychotherapy  · Topics discussed/themes: work stress, difficulty managing affect in interpersonal relationships, building skills sets for symptom management, symptom recognition.    · The patient's response to the intervention is accepting. The patient's progress toward treatment goals is good.   · Duration of intervention: 10 mins    Review of Systems   · PSYCHIATRIC: Pertinant items are noted in the narrative.  · CONSTITUTIONAL: No weight gain or loss.   · RESPIRATORY: No shortness of breath.  · CARDIOVASCULAR: No  "tachycardia or chest pain.  · GASTROINTESTINAL: No nausea, vomiting, pain, constipation or diarrhea.    Past Medical, Family and Social History: The patient's past medical, family and social history have been reviewed and updated as appropriate within the electronic medical record - see encounter notes.    Compliance: yes    Side effects: None    Risk Parameters:  Patient reports no suicidal ideation  Patient reports no homicidal ideation  Patient reports no self-injurious behavior  Patient reports no violent behavior    Exam (detailed: at least 9 elements; comprehensive: all 15 elements)   Constitutional  Vitals: 1 Most recent vital signs, dated less than 90 days prior to this appointment, were reviewed.    Vitals:    08/13/19 0838   BP: 116/60   Pulse: 94   Weight: 71.5 kg (157 lb 8.3 oz)   Height: 4' 11" (1.499 m)        General:  age appropriate, well dressed, neatly groomed, overweight     Musculoskeletal  Muscle Strength/Tone:  no tremor   Gait & Station:  normal, no ataxia     Psychiatric  Speech:  clearly audible, verbal, not pressured   Mood:   Affect:  euthymic  congruent and appropriate   Thought Process:  logical   Associations:  intact   Thought Content:  normal, no suicidality, no homicidality, delusions, or paranoia   Insight:   Judgement:  fair  adequate to circumstances   Orientation:  grossly intact   Memory: intact for content of interview   Language: grossly intact   Attention Span & Concentration:  able to focus   Fund of Knowledge:  intact and appropriate to age and level of education     Assessment and Diagnosis   Status/Progress: Based on the examination today, the patient's problem(s) is/are adequately but not ideally controlled.  New problems have been presented today.   Comorbidities are complicating management of the primary condition:Asperger's complicating her DORIS.  There are no active rule-out diagnoses for this patient at this time.    General Impression: 29 y.o. F with Asperger's and " DORIS.  DORIS relatively well controlled currently.   Has some problems with anger at times, typically with parents.      Diagnosis:  Asperger's Disorder (299.80)  Generalized Anxiety Disorder (300.02).      Intervention/Counseling/Treatment Plan   · Continue current medication, citalopram 40mg daily.        Return to Clinic: 6 months

## 2019-08-20 ENCOUNTER — CLINICAL SUPPORT (OUTPATIENT)
Dept: OPHTHALMOLOGY | Facility: CLINIC | Age: 29
End: 2019-08-20
Payer: COMMERCIAL

## 2019-08-20 ENCOUNTER — OFFICE VISIT (OUTPATIENT)
Dept: OPTOMETRY | Facility: CLINIC | Age: 29
End: 2019-08-20
Payer: COMMERCIAL

## 2019-08-20 DIAGNOSIS — H52.13 MYOPIA OF BOTH EYES: ICD-10-CM

## 2019-08-20 DIAGNOSIS — H40.013 OPEN ANGLE WITH BORDERLINE FINDINGS, LOW RISK, BILATERAL: ICD-10-CM

## 2019-08-20 DIAGNOSIS — H40.013 OPEN ANGLE WITH BORDERLINE FINDINGS, LOW RISK, BILATERAL: Primary | ICD-10-CM

## 2019-08-20 PROCEDURE — 99999 PR PBB SHADOW E&M-EST. PATIENT-LVL I: ICD-10-PCS | Mod: PBBFAC,,,

## 2019-08-20 PROCEDURE — 92020 GONIOSCOPY: CPT | Mod: S$GLB,,, | Performed by: OPTOMETRIST

## 2019-08-20 PROCEDURE — 92083 EXTENDED VISUAL FIELD XM: CPT | Mod: S$GLB,,, | Performed by: OPTOMETRIST

## 2019-08-20 PROCEDURE — 92015 DETERMINE REFRACTIVE STATE: CPT | Mod: S$GLB,,, | Performed by: OPTOMETRIST

## 2019-08-20 PROCEDURE — 92014 COMPRE OPH EXAM EST PT 1/>: CPT | Mod: S$GLB,,, | Performed by: OPTOMETRIST

## 2019-08-20 PROCEDURE — 92083 HUMPHREY VISUAL FIELD - OU - BOTH EYES: ICD-10-PCS | Mod: S$GLB,,, | Performed by: OPTOMETRIST

## 2019-08-20 PROCEDURE — 99999 PR PBB SHADOW E&M-EST. PATIENT-LVL II: ICD-10-PCS | Mod: PBBFAC,,, | Performed by: OPTOMETRIST

## 2019-08-20 PROCEDURE — 99999 PR PBB SHADOW E&M-EST. PATIENT-LVL I: CPT | Mod: PBBFAC,,,

## 2019-08-20 PROCEDURE — 92020 PR SPECIAL EYE EVAL,GONIOSCOPY: ICD-10-PCS | Mod: S$GLB,,, | Performed by: OPTOMETRIST

## 2019-08-20 PROCEDURE — 99999 PR PBB SHADOW E&M-EST. PATIENT-LVL II: CPT | Mod: PBBFAC,,, | Performed by: OPTOMETRIST

## 2019-08-20 PROCEDURE — 92014 PR EYE EXAM, EST PATIENT,COMPREHESV: ICD-10-PCS | Mod: S$GLB,,, | Performed by: OPTOMETRIST

## 2019-08-20 PROCEDURE — 92133 POSTERIOR SEGMENT OCT OPTIC NERVE(OCULAR COHERENCE TOMOGRAPHY) - OU - BOTH EYES: ICD-10-PCS | Mod: S$GLB,,, | Performed by: OPTOMETRIST

## 2019-08-20 PROCEDURE — 92015 PR REFRACTION: ICD-10-PCS | Mod: S$GLB,,, | Performed by: OPTOMETRIST

## 2019-08-20 PROCEDURE — 92133 CPTRZD OPH DX IMG PST SGM ON: CPT | Mod: S$GLB,,, | Performed by: OPTOMETRIST

## 2019-08-20 NOTE — PROGRESS NOTES
HPI     TIFFANIE: 08/15/18  Patient is her for an HVF/OCTand annual eye exam. Pt denies any specific   eye complaints today. She reports clear vision at all ranges uncorrected.   No flashes or floaters  No diplopia  No gtts  No eye sx  glauc susp by CDs      Last edited by Dru Gavin, OD on 8/20/2019 10:36 AM. (History)        ROS     Positive for: Eyes (glauc susp by CDs)    Negative for: Constitutional, Gastrointestinal, Neurological, Skin,   Genitourinary, Musculoskeletal, HENT, Endocrine, Cardiovascular,   Respiratory, Psychiatric, Allergic/Imm, Heme/Lymph    Last edited by Dru Gavin, OD on 8/20/2019 10:36 AM. (History)        Assessment /Plan     For exam results, see Encounter Report.    Open angle with borderline findings, low risk, bilateral    Myopia of both eyes      1. Glauc susp by CDs--see OCT (RNFL wnl OU).  iop hi normal without meds.  VF today  wnl.  +famhx (dad). Pach: 682/684.  Angles open by gonio OU.  Doubt glauc now--will monitor  2. Slight myopia--pt happy without spex         Plan:    rtc 1 yr for HVF 24-2 sf/OCT/full

## 2019-08-20 NOTE — PROGRESS NOTES
hvf done;rel/fix fair od good os coop. Good /chart checked for latex allergy.-[facility]

## 2019-09-24 ENCOUNTER — PATIENT OUTREACH (OUTPATIENT)
Dept: ADMINISTRATIVE | Facility: OTHER | Age: 29
End: 2019-09-24

## 2019-11-06 ENCOUNTER — PATIENT OUTREACH (OUTPATIENT)
Dept: ADMINISTRATIVE | Facility: OTHER | Age: 29
End: 2019-11-06

## 2019-11-06 ENCOUNTER — CLINICAL SUPPORT (OUTPATIENT)
Dept: URGENT CARE | Facility: CLINIC | Age: 29
End: 2019-11-06
Payer: COMMERCIAL

## 2019-11-06 DIAGNOSIS — Z23 FLU VACCINE NEED: Primary | ICD-10-CM

## 2019-11-06 PROCEDURE — 90471 IMMUNIZATION ADMIN: CPT | Mod: S$GLB,,, | Performed by: INTERNAL MEDICINE

## 2019-11-06 PROCEDURE — 90471 FLU VACCINE (QUAD) GREATER THAN OR EQUAL TO 3YO PRESERVATIVE FREE IM: ICD-10-PCS | Mod: S$GLB,,, | Performed by: INTERNAL MEDICINE

## 2019-11-06 PROCEDURE — 90686 IIV4 VACC NO PRSV 0.5 ML IM: CPT | Mod: S$GLB,,, | Performed by: INTERNAL MEDICINE

## 2019-11-06 PROCEDURE — 90686 FLU VACCINE (QUAD) GREATER THAN OR EQUAL TO 3YO PRESERVATIVE FREE IM: ICD-10-PCS | Mod: S$GLB,,, | Performed by: INTERNAL MEDICINE

## 2019-11-08 ENCOUNTER — HOSPITAL ENCOUNTER (OUTPATIENT)
Dept: RADIOLOGY | Facility: HOSPITAL | Age: 29
Discharge: HOME | End: 2019-11-08
Attending: PHYSICIAN ASSISTANT
Payer: COMMERCIAL

## 2019-11-08 ENCOUNTER — OFFICE VISIT (OUTPATIENT)
Dept: SURGERY | Facility: CLINIC | Age: 29
End: 2019-11-08
Payer: COMMERCIAL

## 2019-11-08 VITALS
BODY MASS INDEX: 29.84 KG/M2 | DIASTOLIC BLOOD PRESSURE: 65 MMHG | TEMPERATURE: 98 F | SYSTOLIC BLOOD PRESSURE: 104 MMHG | HEIGHT: 59 IN | WEIGHT: 148 LBS | HEART RATE: 65 BPM

## 2019-11-08 DIAGNOSIS — N63.10 MASSES OF BOTH BREASTS: Primary | ICD-10-CM

## 2019-11-08 DIAGNOSIS — N63.20 MASSES OF BOTH BREASTS: Primary | ICD-10-CM

## 2019-11-08 DIAGNOSIS — R92.8 ABNORMAL FINDING ON BREAST IMAGING: ICD-10-CM

## 2019-11-08 PROCEDURE — 3008F BODY MASS INDEX DOCD: CPT | Mod: CPTII,S$GLB,, | Performed by: PHYSICIAN ASSISTANT

## 2019-11-08 PROCEDURE — 76642 ULTRASOUND BREAST LIMITED: CPT | Mod: TC,PO,LT

## 2019-11-08 PROCEDURE — 99213 PR OFFICE/OUTPT VISIT, EST, LEVL III, 20-29 MIN: ICD-10-PCS | Mod: S$GLB,,, | Performed by: PHYSICIAN ASSISTANT

## 2019-11-08 PROCEDURE — 76642 ULTRASOUND BREAST LIMITED: CPT | Mod: TC,PO,RT

## 2019-11-08 PROCEDURE — 3008F PR BODY MASS INDEX (BMI) DOCUMENTED: ICD-10-PCS | Mod: CPTII,S$GLB,, | Performed by: PHYSICIAN ASSISTANT

## 2019-11-08 PROCEDURE — 76642 ULTRASOUND BREAST LIMITED: CPT | Mod: 26,LT,, | Performed by: RADIOLOGY

## 2019-11-08 PROCEDURE — 76642 US BREAST LEFT LIMITED: ICD-10-PCS | Mod: 26,LT,, | Performed by: RADIOLOGY

## 2019-11-08 PROCEDURE — 99999 PR PBB SHADOW E&M-EST. PATIENT-LVL III: CPT | Mod: PBBFAC,,, | Performed by: PHYSICIAN ASSISTANT

## 2019-11-08 PROCEDURE — 99999 PR PBB SHADOW E&M-EST. PATIENT-LVL III: ICD-10-PCS | Mod: PBBFAC,,, | Performed by: PHYSICIAN ASSISTANT

## 2019-11-08 PROCEDURE — 99213 OFFICE O/P EST LOW 20 MIN: CPT | Mod: S$GLB,,, | Performed by: PHYSICIAN ASSISTANT

## 2019-11-08 NOTE — PROGRESS NOTES
[facility]  [facility] [place]  11/8/2019      SUBJECTIVE:   Ms. Tmamy Bonner is a 29 y.o. female with bilateral breast masses and a history of a fibroadenoma who presents today for follow up breast cancer screening.    History of Present Illness: Patient was previously seen by SARAH Talavera.      Bilateral Breast US 5/23/19 report reviewed:  Findings:   Left  There is a 19 mm x 8 mm x 18 mm oval, parallel, hypoechoic mass with circumscribed margins seen in the left breast at 6 o'clock, . The mass correlates with the palpable mass reported by the patient and referring provider. The mass correlates with the prior ultrasound finding. Compared to the previous study, there are no significant changes.   There is a 13 mm x 6 mm x 9 mm oval, parallel, hypoechoic mass seen in the left breast at 2 o'clock, 4 cm from the nipple. The mass correlates with the palpable mass reported by the patient and referring provider. The mass correlates with the prior ultrasound finding. Compared to the previous study dating back to 2015, there are no significant changes.     Right  There is an 18 mm x 9 mm x 17 mm mass seen in the right breast at 3 o'clock, 1 cm from the nipple. The mass does not correlate with the palpable mass reported by the patient and referring provider. The mass correlates with the prior ultrasound finding. Compared to the previous study dating back to 2014, there are no significant changes.   Impression:  Left  Mass: Left breast 19 mm x 8 mm x 17 mm mass at the anterior 6 o'clock position. Assessment: 3 - Probably benign. Short Interval Follow-Up in 6 Months is recommended.   Right  There is no sonographic evidence of malignancy.  BI-RADS Category:   Overall: 3 - Probably Benign     She had a fibroadenoma of the right breast excised by Dr. Chávez in 2014. She denies any nipple discharge or nipple inversion.  Patient states she can feel the masses in both breasts but she doesn't think they  have changed in size and they are non-painful.      GYN History:  Age of menarche:  11 y/o    LMP:  20  HRT usage:  never, on OCP    Review of Systems: Denies any chest pain or shortness of breath.  Denies any fever or chills.  See HPI/ Interval History for other systems reviewed.    OBJECTIVE:   Vitals:    19 0925   BP: 104/65   Pulse: 65   Temp: 97.9 °F (36.6 °C)     Physical Exam:  HEENT: Normocephalic, atraumatic.    General: alert and oriented; no acute distress.  Breast: ~1 cm rubbery, mobile, non-tender mass at the 3 o'clock position of the right breast, adjacent to the NAC. ~1 cm rubbery, non-tender, mobile mass at the 6 o'clock position of the left breast, ~1 cm from the NAC.   Normal color and contour of right and left breast.  No nipple inversion or discharge bilaterally.    Lymph: No palpable adjacent axillary lymph nodes.      ASSESSMENT:  Ms. Tammy Bonner is a 29 y.o. year old female with bilateral breast masses and a history of a fibroadenoma who presents today for follow up breast cancer screening.    PLAN:   We discussed that these masses are likely benign fibroadenomas.  They appear unchanged on recent imaging, but we will obtain a follow up left breast ultrasound today to confirm this.  Based on these results she can follow up with me in a year vs. 6 months.  I will call or message her with the findings.      ~Giselle Woodall PA-C      Surgical Oncology            2019

## 2019-11-11 DIAGNOSIS — N63.20 BREAST MASS, LEFT: Primary | ICD-10-CM

## 2019-12-12 DIAGNOSIS — Z30.41 ENCOUNTER FOR SURVEILLANCE OF CONTRACEPTIVE PILLS: ICD-10-CM

## 2019-12-12 RX ORDER — NORGESTIMATE AND ETHINYL ESTRADIOL 0.25-0.035
KIT ORAL
Qty: 84 TABLET | Refills: 1 | Status: SHIPPED | OUTPATIENT
Start: 2019-12-12 | End: 2020-01-28 | Stop reason: SDUPTHER

## 2020-01-28 ENCOUNTER — OFFICE VISIT (OUTPATIENT)
Dept: OBSTETRICS AND GYNECOLOGY | Facility: CLINIC | Age: 30
End: 2020-01-28
Payer: COMMERCIAL

## 2020-01-28 VITALS
WEIGHT: 162.69 LBS | BODY MASS INDEX: 32.8 KG/M2 | DIASTOLIC BLOOD PRESSURE: 70 MMHG | SYSTOLIC BLOOD PRESSURE: 114 MMHG | HEIGHT: 59 IN

## 2020-01-28 DIAGNOSIS — Z30.41 ENCOUNTER FOR SURVEILLANCE OF CONTRACEPTIVE PILLS: ICD-10-CM

## 2020-01-28 DIAGNOSIS — Z01.419 VISIT FOR GYNECOLOGIC EXAMINATION: Primary | ICD-10-CM

## 2020-01-28 DIAGNOSIS — B37.31 YEAST VAGINITIS: ICD-10-CM

## 2020-01-28 LAB
BACTERIAL VAGINOSIS DNA: NEGATIVE
CANDIDA GLABRATA DNA: NEGATIVE
CANDIDA KRUSEI DNA: NEGATIVE
CANDIDA RRNA VAG QL PROBE: POSITIVE
T VAGINALIS RRNA GENITAL QL PROBE: NEGATIVE

## 2020-01-28 PROCEDURE — 99395 PREV VISIT EST AGE 18-39: CPT | Mod: S$GLB,,, | Performed by: OBSTETRICS & GYNECOLOGY

## 2020-01-28 PROCEDURE — 99999 PR PBB SHADOW E&M-EST. PATIENT-LVL III: CPT | Mod: PBBFAC,,, | Performed by: OBSTETRICS & GYNECOLOGY

## 2020-01-28 PROCEDURE — 87481 CANDIDA DNA AMP PROBE: CPT | Mod: 59

## 2020-01-28 PROCEDURE — 87661 TRICHOMONAS VAGINALIS AMPLIF: CPT

## 2020-01-28 PROCEDURE — 99999 PR PBB SHADOW E&M-EST. PATIENT-LVL III: ICD-10-PCS | Mod: PBBFAC,,, | Performed by: OBSTETRICS & GYNECOLOGY

## 2020-01-28 PROCEDURE — 99395 PR PREVENTIVE VISIT,EST,18-39: ICD-10-PCS | Mod: S$GLB,,, | Performed by: OBSTETRICS & GYNECOLOGY

## 2020-01-28 RX ORDER — NORGESTIMATE AND ETHINYL ESTRADIOL 0.25-0.035
1 KIT ORAL DAILY
Qty: 84 TABLET | Refills: 4 | Status: SHIPPED | OUTPATIENT
Start: 2020-01-28 | End: 2020-03-27 | Stop reason: SDUPTHER

## 2020-01-28 NOTE — PROGRESS NOTES
HISTORY OF PRESENT ILLNESS:    Tammy Bonner is a 29 y.o. female, , Patient's last menstrual period was 2019.,  presents for a routine exam and has no complaints. PAP DUE , REFILL OCP.  HAS HAD SOME ITCHING PERSIST AFTER USING [facility] AND AGAIN REVIEWED TOPICAL ALLERGEN ISSUES.  AFFIRM AND ADVISED RE MONISTAT 3 SUPPOS  [email or web address] [facility] - KAYLIN'S FAVORITE)    LAST VISIT 2019:   PAP DUE, OCP REFILL.  SPOTS 4 DAYS PRIOR TO PERIOD, TOTAL OF 8 DAYS BLEEDING. STD SCREEN FOR POM.  DISCUSSED CERVIX FRIABLE, DISCUSSED TOPICAL ALLERGENS, ADVISED POSS YEAST - WHITE DISCHARGE AND MONISTAT 3 SUPPOS.  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 female, , Patient's last menstrual period was 2017 (approximate).,  presents for a routine exam and OCP refills.  -Requests STD screening.    Past Medical History:   Diagnosis Date    Abnormal cervical Papanicolaou smear     Re-pap    ALLERGIC RHINITIS     Anxiety     Asperger's syndrome     learning disability    Depression     Fibroadenoma of right breast     Glaucoma suspect        Past Surgical History:   Procedure Laterality Date    BREAST BIOPSY      Right - fiboradenoma    right submandibular siliathosis removal  2018    removal gland and stones     WISDOM TOOTH EXTRACTION         MEDICATIONS AND ALLERGIES:      Current Outpatient Medications:     citalopram (CELEXA) 40 MG tablet, Take 1 tablet (40 mg total) by mouth once daily., Disp: 90 tablet, Rfl: 1    multivitamin (ONE DAILY MULTIVITAMIN) per tablet, Take 1 tablet by mouth once daily., Disp: , Rfl:     norgestimate-ethinyl estradiol (FEMYNOR) 0.25-35 mg-mcg per tablet, Take 1 tablet by mouth once daily., Disp: 84 tablet, Rfl: 4    Review of  patient's allergies indicates:   Allergen Reactions    No known drug allergies        Family History   Problem Relation Age of Onset    Hypertension Mother     Diabetes Mother     Glaucoma Father     Ovarian cancer Other         mat GGM    Breast cancer Neg Hx     Colon cancer Neg Hx        Social History     Socioeconomic History    Marital status: Single     Spouse name: Not on file    Number of children: Not on file    Years of education: Not on file    Highest education level: Not on file   Occupational History    Not on file   Social Needs    Financial resource strain: Not on file    Food insecurity:     Worry: Not on file     Inability: Not on file    Transportation needs:     Medical: Not on file     Non-medical: Not on file   Tobacco Use    Smoking status: Never Smoker    Smokeless tobacco: Never Used   Substance and Sexual Activity    Alcohol use: No    Drug use: No    Sexual activity: Yes     Partners: Female     Birth control/protection: OCP   Lifestyle    Physical activity:     Days per week: Not on file     Minutes per session: Not on file    Stress: Not on file   Relationships    Social connections:     Talks on phone: Not on file     Gets together: Not on file     Attends Rastafari service: Not on file     Active member of club or organization: Not on file     Attends meetings of clubs or organizations: Not on file     Relationship status: Not on file   Other Topics Concern    Not on file   Social History Narrative    Not on file       COMPREHENSIVE GYN HISTORY:  PAP History: Denies abnormal Paps.  Infection History: Denies STDs. Denies PID.  Benign History: Denies uterine fibroids. Denies ovarian cysts. Denies endometriosis. Denies other conditions.  Cancer History: Denies cervical cancer. Denies uterine cancer or hyperplasia. Denies ovarian cancer. Denies vulvar cancer or pre-cancer. Denies vaginal cancer or pre-cancer. Denies breast cancer. Denies colon cancer.  Sexual  "Activity History: Reports currently being sexually active  Menstrual History: Monthly, mild-moderate.  Contraception:oral contraceptives (estrogen/progesterone)    ROS:  GENERAL: No weight changes. No swelling. No fatigue. No fever.  CARDIOVASCULAR: No chest pain. No shortness of breath. No leg cramps.   NEUROLOGICAL: No headaches. No vision changes.  BREASTS: No pain. No lumps. No discharge.  ABDOMEN: No pain. No nausea. No vomiting. No diarrhea. No constipation.  REPRODUCTIVE: No abnormal bleeding.   VULVA: No pain. No lesions. No itching.  VAGINA: No relaxation. No itching. No odor. No discharge. No lesions.  URINARY: No incontinence. No nocturia. No frequency. No dysuria.    /70   Ht 4' 11" (1.499 m)   Wt 73.8 kg (162 lb 11.2 oz)   LMP 12/28/2019   BMI 32.86 kg/m²     PE:  APPEARANCE: Well nourished, well developed, in no acute distress.  AFFECT: WNL, alert and oriented x 3.  SKIN: No acne or hirsutism.  NECK: Neck symmetric, without masses or thyromegaly.  NODES: No inguinal, cervical, axillary or femoral lymph node enlargement.  CHEST: Good respiratory effort.   ABDOMEN: Soft. No tenderness or masses. No hepatosplenomegaly. No hernias.  BREASTS: Symmetrical, no skin changes, visible lesions, palpable masses or nipple discharge bilaterally.  PELVIC: External female genitalia without lesions.  Female hair distribution. Adequate perineal body, Normal urethral meatus. Vagina moist and well rugated with SOME THICK WHITE discharge.  No significant cystocele or rectocele present. Cervix pink without lesions, discharge or tenderness. Uterus is normal size, regular, mobile and nontender. Adnexa without masses or tenderness.  EXTREMITIES: No edema    PROCEDURES:    DIAGNOSIS:  1. Visit for gynecologic examination     2. Encounter for surveillance of contraceptive pills  norgestimate-ethinyl estradiol (FEMYNOR) 0.25-35 mg-mcg per tablet   3. Yeast vaginitis  Vaginosis Screen by DNA Probe       LABS AND TESTS " ORDERED:    MEDICATIONS PRESCRIBED:    COUNSELING:   The patient was counseled today on ACS PAP guidelines, with recommendations for yearly pelvic exams unless their uterus, cervix, and ovaries were removed for benign reasons; in that case, examinations every 3-5 years are recommended.  The patient was also counseled regarding monthly breast self-examination, routine STD screening for at-risk populations, prophylactic immunizations for transmitted infections such as  HPV, Pertussis, or Influenza as appropriate, and yearly mammograms when indicated by ACS guidelines.  She was advised to see her primary care physician for all other health maintenance.    FOLLOW-UP with me annually.

## 2020-01-29 ENCOUNTER — PATIENT MESSAGE (OUTPATIENT)
Dept: OBSTETRICS AND GYNECOLOGY | Facility: CLINIC | Age: 30
End: 2020-01-29

## 2020-01-29 ENCOUNTER — TELEPHONE (OUTPATIENT)
Dept: OBSTETRICS AND GYNECOLOGY | Facility: CLINIC | Age: 30
End: 2020-01-29

## 2020-01-29 NOTE — TELEPHONE ENCOUNTER
Called patient back, advised patient start the monistat and it will take care of it.. Patient understands, and will follow up if any of concerns.

## 2020-03-04 ENCOUNTER — OFFICE VISIT (OUTPATIENT)
Dept: PSYCHIATRY | Facility: CLINIC | Age: 30
End: 2020-03-04
Payer: COMMERCIAL

## 2020-03-04 VITALS
HEART RATE: 107 BPM | BODY MASS INDEX: 32.68 KG/M2 | WEIGHT: 161.81 LBS | DIASTOLIC BLOOD PRESSURE: 73 MMHG | SYSTOLIC BLOOD PRESSURE: 114 MMHG

## 2020-03-04 DIAGNOSIS — F41.1 GAD (GENERALIZED ANXIETY DISORDER): ICD-10-CM

## 2020-03-04 DIAGNOSIS — F84.5 ASPERGER SYNDROME: Primary | ICD-10-CM

## 2020-03-04 PROCEDURE — 99214 PR OFFICE/OUTPT VISIT, EST, LEVL IV, 30-39 MIN: ICD-10-PCS | Mod: S$GLB,,, | Performed by: PSYCHIATRY & NEUROLOGY

## 2020-03-04 PROCEDURE — 3008F BODY MASS INDEX DOCD: CPT | Mod: CPTII,S$GLB,, | Performed by: PSYCHIATRY & NEUROLOGY

## 2020-03-04 PROCEDURE — 99999 PR PBB SHADOW E&M-EST. PATIENT-LVL II: ICD-10-PCS | Mod: PBBFAC,,, | Performed by: PSYCHIATRY & NEUROLOGY

## 2020-03-04 PROCEDURE — 3008F PR BODY MASS INDEX (BMI) DOCUMENTED: ICD-10-PCS | Mod: CPTII,S$GLB,, | Performed by: PSYCHIATRY & NEUROLOGY

## 2020-03-04 PROCEDURE — 99214 OFFICE O/P EST MOD 30 MIN: CPT | Mod: S$GLB,,, | Performed by: PSYCHIATRY & NEUROLOGY

## 2020-03-04 PROCEDURE — 99999 PR PBB SHADOW E&M-EST. PATIENT-LVL II: CPT | Mod: PBBFAC,,, | Performed by: PSYCHIATRY & NEUROLOGY

## 2020-03-04 RX ORDER — ESCITALOPRAM OXALATE 20 MG/1
20 TABLET ORAL DAILY
Qty: 30 TABLET | Refills: 1 | Status: SHIPPED | OUTPATIENT
Start: 2020-03-04 | End: 2020-03-27 | Stop reason: SDUPTHER

## 2020-03-04 NOTE — PROGRESS NOTES
"Outpatient Psychiatry Follow-Up Visit (MD/NP)    3/4/2020    Clinical Status of Patient:  Outpatient (Ambulatory)    Chief Complaint:  Tammy Bonner is a 29 y.o. female who presents today for follow-up of anxiety primarily, hx of low mood, stuttering, asperger's associated issues        Met with patient.      Interval History and Content of Current Session:  Interim Events/Subjective Report/Content of Current Session:    "Not too bad."  Mostly hs been good.  Recently went to [employer] in [place] and enjoyed it.  However she has some days when she feels negative about herself when she makes a mistake that might be minor.  She is then afraid to feel positive for fear of making the same mistake again.  Afraid of believing she can do better.  She gives the example of feeling out of control when her cat bit her recently.  She denies losing her temper with people.  Another example was when her parents informed her that using her credit card to buy gas was not a good idea.  Lost confidence in herself.  Sometimes feel like "I'm no good as a person" and better off not living.  Rarely has SI, scratches her arm when she feels this way.  Put fingernails in her arm last night.  Shows barely noticeable break in skin the size of a fingernail.  No other self harm.  Lasts no more than 15 mins and then is slowly getting over it.  Asks about medication changes.        Psychotherapy:  · Target symptoms: mood swings, impulsivity, SI  · Why chosen therapy is appropriate versus another modality: relevant to diagnosis, patient responds to this modality  · Outcome monitoring methods: self-report, observation  · Therapeutic intervention type: supportive psychotherapy  · Topics discussed/themes: work stress, difficulty managing affect in interpersonal relationships, building skills sets for symptom management, symptom recognition.    · The patient's response to the intervention is accepting. The patient's progress toward treatment " goals is good.   · Duration of intervention: 10 mins    Review of Systems   · PSYCHIATRIC: Pertinant items are noted in the narrative.  · CONSTITUTIONAL: No weight gain or loss.   · RESPIRATORY: No shortness of breath.  · CARDIOVASCULAR: No tachycardia or chest pain.  · GASTROINTESTINAL: No nausea, vomiting, pain, constipation or diarrhea.    Past Medical, Family and Social History: The patient's past medical, family and social history have been reviewed and updated as appropriate within the electronic medical record - see encounter notes.    Compliance: yes    Side effects: None    Risk Parameters:  Patient reports no suicidal ideation  Patient reports no homicidal ideation  Patient reports no self-injurious behavior  Patient reports no violent behavior    Exam (detailed: at least 9 elements; comprehensive: all 15 elements)   Constitutional  Vitals: 1 Most recent vital signs, dated less than 90 days prior to this appointment, were reviewed.    Vitals:    03/04/20 0807   BP: 114/73   Pulse: 107   Weight: 73.4 kg (161 lb 13.1 oz)        General:  age appropriate, well dressed, neatly groomed, overweight     Musculoskeletal  Muscle Strength/Tone:  no tremor   Gait & Station:  normal, no ataxia     Psychiatric  Speech:  clearly audible, verbal, not pressured   Mood:   Affect:  euthymic  congruent and appropriate   Thought Process:  logical   Associations:  intact   Thought Content:  normal, no suicidality, no homicidality, delusions, or paranoia   Insight:   Judgement:  fair  adequate to circumstances   Orientation:  grossly intact   Memory: intact for content of interview   Language: grossly intact   Attention Span & Concentration:  able to focus   Fund of Knowledge:  intact and appropriate to age and level of education     Assessment and Diagnosis   Status/Progress: Based on the examination today, the patient's problem(s) is/are adequately but not ideally controlled.  New problems have been presented today.    Comorbidities are complicating management of the primary condition:Asperger's complicating her DORIS.  There are no active rule-out diagnoses for this patient at this time.    General Impression: 29 y.o. F with Asperger's and DORIS.  DORIS relatively well controlled currently.   Has some problems with anger at times, typically with parents.      Diagnosis:  Asperger's Disorder (299.80)  Generalized Anxiety Disorder (300.02).      Intervention/Counseling/Treatment Plan   · Discussed several options.  Switch celexa 40 mg to lexapro 20 mg.  If not improved by next appt consider trials of other antidepressants.        Return to Clinic: 2 months

## 2020-03-26 ENCOUNTER — PATIENT MESSAGE (OUTPATIENT)
Dept: OBSTETRICS AND GYNECOLOGY | Facility: CLINIC | Age: 30
End: 2020-03-26

## 2020-03-26 DIAGNOSIS — Z30.41 ENCOUNTER FOR SURVEILLANCE OF CONTRACEPTIVE PILLS: ICD-10-CM

## 2020-03-26 RX ORDER — NORGESTIMATE AND ETHINYL ESTRADIOL 0.25-0.035
1 KIT ORAL DAILY
Qty: 84 TABLET | Refills: 4 | Status: CANCELLED | OUTPATIENT
Start: 2020-03-26

## 2020-03-27 DIAGNOSIS — Z30.41 ENCOUNTER FOR SURVEILLANCE OF CONTRACEPTIVE PILLS: ICD-10-CM

## 2020-03-27 RX ORDER — NORGESTIMATE AND ETHINYL ESTRADIOL 0.25-0.035
1 KIT ORAL DAILY
Qty: 84 TABLET | Refills: 4 | Status: SHIPPED | OUTPATIENT
Start: 2020-03-27 | End: 2021-05-19

## 2020-03-27 RX ORDER — ESCITALOPRAM OXALATE 20 MG/1
20 TABLET ORAL DAILY
Qty: 30 TABLET | Refills: 1 | Status: SHIPPED | OUTPATIENT
Start: 2020-03-27 | End: 2020-04-28

## 2020-03-30 ENCOUNTER — PATIENT MESSAGE (OUTPATIENT)
Dept: PSYCHIATRY | Facility: CLINIC | Age: 30
End: 2020-03-30

## 2020-03-30 RX ORDER — ESCITALOPRAM OXALATE 20 MG/1
TABLET ORAL
Qty: 30 TABLET | Refills: 1 | OUTPATIENT
Start: 2020-03-30

## 2020-04-28 RX ORDER — ESCITALOPRAM OXALATE 20 MG/1
TABLET ORAL
Qty: 30 TABLET | Refills: 0 | Status: SHIPPED | OUTPATIENT
Start: 2020-04-28 | End: 2020-05-05 | Stop reason: SDUPTHER

## 2020-05-05 ENCOUNTER — PATIENT MESSAGE (OUTPATIENT)
Dept: PSYCHIATRY | Facility: CLINIC | Age: 30
End: 2020-05-05

## 2020-05-05 ENCOUNTER — OFFICE VISIT (OUTPATIENT)
Dept: PSYCHIATRY | Facility: CLINIC | Age: 30
End: 2020-05-05
Payer: COMMERCIAL

## 2020-05-05 DIAGNOSIS — F84.5 ASPERGER SYNDROME: ICD-10-CM

## 2020-05-05 DIAGNOSIS — F41.1 GAD (GENERALIZED ANXIETY DISORDER): Primary | ICD-10-CM

## 2020-05-05 DIAGNOSIS — F41.1 GAD (GENERALIZED ANXIETY DISORDER): ICD-10-CM

## 2020-05-05 PROCEDURE — 99213 PR OFFICE/OUTPT VISIT, EST, LEVL III, 20-29 MIN: ICD-10-PCS | Mod: 95,,, | Performed by: PSYCHIATRY & NEUROLOGY

## 2020-05-05 PROCEDURE — 99213 OFFICE O/P EST LOW 20 MIN: CPT | Mod: 95,,, | Performed by: PSYCHIATRY & NEUROLOGY

## 2020-05-05 RX ORDER — CITALOPRAM 40 MG/1
TABLET, FILM COATED ORAL
Qty: 90 TABLET | Refills: 1 | OUTPATIENT
Start: 2020-05-05

## 2020-05-05 RX ORDER — ESCITALOPRAM OXALATE 20 MG/1
20 TABLET ORAL DAILY
Qty: 30 TABLET | Refills: 6 | Status: SHIPPED | OUTPATIENT
Start: 2020-05-05 | End: 2020-11-30 | Stop reason: SDUPTHER

## 2020-05-05 NOTE — PROGRESS NOTES
The patient location is:  home  The chief complaint leading to consultation is:  Anxiety  Visit type: audiovisual  Total time spent with patient:  25 minutes  Each patient to whom he or she provides medical services by telemedicine is:  (1) informed of the relationship between the physician and patient and the respective role of any other health care provider with respect to management of the patient; and (2) notified that he or she may decline to receive medical services by telemedicine and may withdraw from such care at any time.    Notes:     Outpatient Psychiatry Follow-Up Visit (MD/NP)    5/5/2020    Clinical Status of Patient:  Outpatient (Ambulatory)    Chief Complaint:  Tammy Bonner is a 29 y.o. female who presents today for follow-up of anxiety primarily, hx of low mood, stuttering, asperger's associated issues        Met with patient.      Interval History and Content of Current Session:  Interim Events/Subjective Report/Content of Current Session:   At our last appt citalopram 40 mg was switched to escitalopram 20 mg.  The patient reports this went well.  She reports she notices no drastic changes with transition.  She reports that she has not been very worried except with beginning the corona virus pandemic.  Was somewhat worried as well when she started to work.  She reports she has not been losing temper feels that this is better with the medication.  She denies any self-harm.  She is eating well.    She has now been working from home more than 6 weeks.  She continues to work for a [employer] company.  She is now working in earlier shift, 8:00 a.m. to 430 which leaves her a more time after work to engage in other activities.  She has been walking with her mom.  She is sleeping well.  She is trying to engage in yoga as well.        Psychotherapy:  · Target symptoms: mood swings, impulsivity, SI  · Why chosen therapy is appropriate versus another modality: relevant to diagnosis, patient responds to  this modality  · Outcome monitoring methods: self-report, observation  · Therapeutic intervention type: supportive psychotherapy  · Topics discussed/themes: work stress, difficulty managing affect in interpersonal relationships, building skills sets for symptom management, symptom recognition.    · The patient's response to the intervention is accepting. The patient's progress toward treatment goals is good.   · Duration of intervention: 10 mins    Review of Systems   Constitutional: Negative for chills and fever.   Respiratory: Negative for cough, shortness of breath and wheezing.    Cardiovascular: Negative for chest pain and palpitations.   Gastrointestinal: Negative for abdominal pain, diarrhea and vomiting.   Genitourinary: Negative for dysuria.   Neurological: Negative for weakness and headaches.         Past Medical, Family and Social History: The patient's past medical, family and social history have been reviewed and updated as appropriate within the electronic medical record - see encounter notes.    Compliance: yes    Side effects: None    Risk Parameters:  Patient reports no suicidal ideation  Patient reports no homicidal ideation  Patient reports no self-injurious behavior  Patient reports no violent behavior    Exam (detailed: at least 9 elements; comprehensive: all 15 elements)   Constitutional  Vitals: 1 Most recent vital signs, dated less than 90 days prior to this appointment, were reviewed.    There were no vitals filed for this visit.     General:  age appropriate, casually dressed, neatly groomed, overweight     Musculoskeletal  Muscle Strength/Tone:  no tremor   Gait & Station:  not tested     Psychiatric  Speech:  clearly audible, verbal, not pressured   Mood:   Affect:  euthymic  congruent and appropriate   Thought Process:  logical   Associations:  intact   Thought Content:  normal, no suicidality, no homicidality, delusions, or paranoia   Insight:   Judgement:  fair  adequate to  circumstances   Orientation:  grossly intact   Memory: intact for content of interview   Language: grossly intact   Attention Span & Concentration:  able to focus   Fund of Knowledge:  intact and appropriate to age and level of education     Assessment and Diagnosis   Status/Progress: Based on the examination today, the patient's problem(s) is/are well controlled.  New problems have been presented today.   Comorbidities are complicating management of the primary condition:Asperger's complicating her DORIS.  There are no active rule-out diagnoses for this patient at this time.    General Impression: 29 y.o. F with Asperger's and DORIS.  DORIS relatively well controlled currently.   Has some problems with anger at times, typically with parents.      Diagnosis:  Asperger's Disorder (299.80)  Generalized Anxiety Disorder (300.02).      Intervention/Counseling/Treatment Plan   · Continue lexapro 20 mg daily          Return to Clinic: 6 months

## 2020-06-02 ENCOUNTER — HOSPITAL ENCOUNTER (OUTPATIENT)
Dept: RADIOLOGY | Facility: HOSPITAL | Age: 30
Discharge: HOME | End: 2020-06-02
Attending: PHYSICIAN ASSISTANT
Payer: COMMERCIAL

## 2020-06-02 DIAGNOSIS — N63.20 BREAST MASS, LEFT: ICD-10-CM

## 2020-06-02 PROCEDURE — 76642 ULTRASOUND BREAST LIMITED: CPT | Mod: TC,PO,LT

## 2020-06-02 PROCEDURE — 76642 US BREAST LEFT LIMITED: ICD-10-PCS | Mod: 26,LT,, | Performed by: RADIOLOGY

## 2020-06-02 PROCEDURE — 76642 ULTRASOUND BREAST LIMITED: CPT | Mod: 26,LT,, | Performed by: RADIOLOGY

## 2020-06-11 ENCOUNTER — DOCUMENTATION ONLY (OUTPATIENT)
Dept: SURGERY | Facility: CLINIC | Age: 30
End: 2020-06-11

## 2020-06-11 DIAGNOSIS — N63.20 LEFT BREAST MASS: Primary | ICD-10-CM

## 2020-06-11 NOTE — PROGRESS NOTES
I spoke to patient and discussed u/s findings.  She can follow up in one year with a Left breast u/s.

## 2020-06-11 NOTE — PROGRESS NOTES
Tried calling patient with breast ultrasound results but she didn't answer. I will try reaching her again later today. She will need a follow up left breast u/s in one year.      Giselle

## 2020-07-14 ENCOUNTER — OFFICE VISIT (OUTPATIENT)
Dept: INTERNAL MEDICINE | Facility: CLINIC | Age: 30
End: 2020-07-14
Payer: COMMERCIAL

## 2020-07-14 ENCOUNTER — IMMUNIZATION (OUTPATIENT)
Dept: PHARMACY | Facility: CLINIC | Age: 30
End: 2020-07-14
Payer: COMMERCIAL

## 2020-07-14 VITALS
OXYGEN SATURATION: 97 % | SYSTOLIC BLOOD PRESSURE: 118 MMHG | HEIGHT: 59 IN | BODY MASS INDEX: 32.4 KG/M2 | WEIGHT: 160.69 LBS | HEART RATE: 90 BPM | DIASTOLIC BLOOD PRESSURE: 60 MMHG

## 2020-07-14 DIAGNOSIS — Z00.00 ANNUAL PHYSICAL EXAM: Primary | ICD-10-CM

## 2020-07-14 DIAGNOSIS — D24.9 FIBROADENOMA OF BREAST, UNSPECIFIED LATERALITY: ICD-10-CM

## 2020-07-14 DIAGNOSIS — L85.3 DRY SKIN DERMATITIS: ICD-10-CM

## 2020-07-14 DIAGNOSIS — L70.9 ACNE, UNSPECIFIED ACNE TYPE: ICD-10-CM

## 2020-07-14 PROCEDURE — 99395 PREV VISIT EST AGE 18-39: CPT | Mod: S$GLB,,, | Performed by: INTERNAL MEDICINE

## 2020-07-14 PROCEDURE — 99999 PR PBB SHADOW E&M-EST. PATIENT-LVL IV: ICD-10-PCS | Mod: PBBFAC,,, | Performed by: INTERNAL MEDICINE

## 2020-07-14 PROCEDURE — 99999 PR PBB SHADOW E&M-EST. PATIENT-LVL IV: CPT | Mod: PBBFAC,,, | Performed by: INTERNAL MEDICINE

## 2020-07-14 PROCEDURE — 99395 PR PREVENTIVE VISIT,EST,18-39: ICD-10-PCS | Mod: S$GLB,,, | Performed by: INTERNAL MEDICINE

## 2020-07-14 NOTE — PROGRESS NOTES
Answers for HPI/ROS submitted by the patient on 7/9/2020   activity change: No  unexpected weight change: No  neck pain: No  hearing loss: No  rhinorrhea: No  trouble swallowing: No  eye discharge: No  visual disturbance: No  chest tightness: No  wheezing: No  chest pain: No  palpitations: No  blood in stool: No  constipation: No  vomiting: No  diarrhea: No  polydipsia: No  polyuria: No  difficulty urinating: No  hematuria: No  menstrual problem: No  dysuria: No  joint swelling: No  arthralgias: No  headaches: No  weakness: No  confusion: No  dysphoric mood: No  Subjective:       Patient ID: Tammy Bonner is a 30 y.o. female.    Chief Complaint: Annual Exam   This is a 30-year-old who presents today for checkup.  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  She continues to say active has been exercising walking and going to the gym to maintain her activity.  She still has issues with heavy cycles at times but takes a multivitamin over-the-counter.  She continues to follow with psychiatry and has had some adjustments in her medication reports her Lexapro was increased due to some increasing anxiety with improvement.  She also recently saw the breast clinic and had a follow-up ultrasound with stable fibroadenoma and hx breast cysts   She has dry skin on her feet and hands they tend to get Sha itchy at times she tries to moisturize she does do a lot hand washing.    HPI  Review of Systems   Constitutional: Negative for activity change and unexpected weight change.   HENT: Negative for hearing loss, rhinorrhea and trouble swallowing.    Eyes: Negative for discharge and visual disturbance.   Respiratory: Negative for chest tightness and wheezing.    Cardiovascular: Negative for chest pain and palpitations.   Gastrointestinal: Negative for blood in stool, constipation, diarrhea and vomiting.   Endocrine: Negative for polydipsia and polyuria.  "  Genitourinary: Negative for difficulty urinating, dysuria, hematuria and menstrual problem.   Musculoskeletal: Negative for arthralgias, joint swelling and neck pain.   Skin:        Acne dry skin    Neurological: Negative for weakness and headaches.   Psychiatric/Behavioral: Negative for confusion and dysphoric mood.       Objective:     Blood pressure 118/60, pulse 90, height 4' 11" (1.499 m), weight 72.9 kg (160 lb 11.5 oz), SpO2 97 %.    Physical Exam  Constitutional:       General: She is not in acute distress.  HENT:      Head: Normocephalic.   Eyes:      General: No scleral icterus.  Neck:      Musculoskeletal: Neck supple.   Cardiovascular:      Rate and Rhythm: Normal rate and regular rhythm.      Heart sounds: Normal heart sounds. No murmur. No friction rub. No gallop.    Pulmonary:      Effort: Pulmonary effort is normal. No respiratory distress.      Breath sounds: Normal breath sounds.   Abdominal:      General: Bowel sounds are normal.      Palpations: Abdomen is soft. There is no mass.      Tenderness: There is no abdominal tenderness.   Skin:     Findings: No erythema.      Comments: Dry skin erythema hands  And top of feet some excoriation    Neurological:      Mental Status: She is alert.         Assessment:       1. Annual physical exam    2. Acne, unspecified acne type    3. Dry skin dermatitis    4. Fibroadenoma of breast, unspecified laterality        Plan:       Tammy was seen today for annual exam.    Diagnoses and all orders for this visit:    Annual physical exam  -     Basic metabolic panel; Future  -     CBC auto differential; Future  -     Hepatic function panel; Future  -     Lipid Panel; Future  -     TSH; Future  -     Ferritin; Future  -     Iron and TIBC; Future    Acne, unspecified acne type  Dry skin dermatitis  Discussed with patient avoid scratching moisturize and dermatology referral placed  -     Ambulatory referral/consult to Dermatology; Future    Fibroadenoma of " breast, unspecified laterality  History of with breast cyst she is followed by the breast Center and had recent ultrasound    She continues to follow with gynecologist up-to-date on her Paps and pelvic with heavy cycle she is taking multivitamin    Update tetanus pertussis continue regular exercise    Follow up annually sooner if concern

## 2020-07-20 ENCOUNTER — LAB VISIT (OUTPATIENT)
Dept: LAB | Facility: HOSPITAL | Age: 30
End: 2020-07-20
Attending: INTERNAL MEDICINE
Payer: COMMERCIAL

## 2020-07-20 DIAGNOSIS — Z00.00 ANNUAL PHYSICAL EXAM: ICD-10-CM

## 2020-07-20 LAB
ALBUMIN SERPL BCP-MCNC: 3.5 G/DL (ref 3.5–5.2)
ALP SERPL-CCNC: 79 U/L (ref 55–135)
ALT SERPL W/O P-5'-P-CCNC: 17 U/L (ref 10–44)
ANION GAP SERPL CALC-SCNC: 7 MMOL/L (ref 8–16)
AST SERPL-CCNC: 20 U/L (ref 10–40)
BASOPHILS # BLD AUTO: 0.05 K/UL (ref 0–0.2)
BASOPHILS NFR BLD: 0.7 % (ref 0–1.9)
BILIRUB DIRECT SERPL-MCNC: 0.2 MG/DL (ref 0.1–0.3)
BILIRUB SERPL-MCNC: 0.4 MG/DL (ref 0.1–1)
BUN SERPL-MCNC: 13 MG/DL (ref 6–20)
CALCIUM SERPL-MCNC: 9.2 MG/DL (ref 8.7–10.5)
CHLORIDE SERPL-SCNC: 104 MMOL/L (ref 95–110)
CHOLEST SERPL-MCNC: 226 MG/DL (ref 120–199)
CHOLEST/HDLC SERPL: 3.3 {RATIO} (ref 2–5)
CO2 SERPL-SCNC: 27 MMOL/L (ref 23–29)
CREAT SERPL-MCNC: 0.8 MG/DL (ref 0.5–1.4)
DIFFERENTIAL METHOD: ABNORMAL
EOSINOPHIL # BLD AUTO: 0.3 K/UL (ref 0–0.5)
EOSINOPHIL NFR BLD: 4.1 % (ref 0–8)
ERYTHROCYTE [DISTWIDTH] IN BLOOD BY AUTOMATED COUNT: 13 % (ref 11.5–14.5)
EST. GFR  (AFRICAN AMERICAN): >60 ML/MIN/1.73 M^2
EST. GFR  (NON AFRICAN AMERICAN): >60 ML/MIN/1.73 M^2
FERRITIN SERPL-MCNC: 56 NG/ML (ref 20–300)
GLUCOSE SERPL-MCNC: 84 MG/DL (ref 70–110)
HCT VFR BLD AUTO: 41.6 % (ref 37–48.5)
HDLC SERPL-MCNC: 69 MG/DL (ref 40–75)
HDLC SERPL: 30.5 % (ref 20–50)
HGB BLD-MCNC: 13.4 G/DL (ref 12–16)
IMM GRANULOCYTES # BLD AUTO: 0.04 K/UL (ref 0–0.04)
IMM GRANULOCYTES NFR BLD AUTO: 0.6 % (ref 0–0.5)
IRON SERPL-MCNC: 124 UG/DL (ref 30–160)
LDLC SERPL CALC-MCNC: 122.2 MG/DL (ref 63–159)
LYMPHOCYTES # BLD AUTO: 2.1 K/UL (ref 1–4.8)
LYMPHOCYTES NFR BLD: 28.9 % (ref 18–48)
MCH RBC QN AUTO: 29.5 PG (ref 27–31)
MCHC RBC AUTO-ENTMCNC: 32.2 G/DL (ref 32–36)
MCV RBC AUTO: 91 FL (ref 82–98)
MONOCYTES # BLD AUTO: 0.6 K/UL (ref 0.3–1)
MONOCYTES NFR BLD: 8.7 % (ref 4–15)
NEUTROPHILS # BLD AUTO: 4 K/UL (ref 1.8–7.7)
NEUTROPHILS NFR BLD: 57 % (ref 38–73)
NONHDLC SERPL-MCNC: 157 MG/DL
NRBC BLD-RTO: 0 /100 WBC
PLATELET # BLD AUTO: 383 K/UL (ref 150–350)
PMV BLD AUTO: 9.4 FL (ref 9.2–12.9)
POTASSIUM SERPL-SCNC: 4.3 MMOL/L (ref 3.5–5.1)
PROT SERPL-MCNC: 7.3 G/DL (ref 6–8.4)
RBC # BLD AUTO: 4.55 M/UL (ref 4–5.4)
SATURATED IRON: 24 % (ref 20–50)
SODIUM SERPL-SCNC: 138 MMOL/L (ref 136–145)
TOTAL IRON BINDING CAPACITY: 527 UG/DL (ref 250–450)
TRANSFERRIN SERPL-MCNC: 356 MG/DL (ref 200–375)
TRIGL SERPL-MCNC: 174 MG/DL (ref 30–150)
TSH SERPL DL<=0.005 MIU/L-ACNC: 1.45 UIU/ML (ref 0.4–4)
WBC # BLD AUTO: 7.09 K/UL (ref 3.9–12.7)

## 2020-07-20 PROCEDURE — 80061 LIPID PANEL: CPT

## 2020-07-20 PROCEDURE — 36415 COLL VENOUS BLD VENIPUNCTURE: CPT

## 2020-07-20 PROCEDURE — 85025 COMPLETE CBC W/AUTO DIFF WBC: CPT

## 2020-07-20 PROCEDURE — 83540 ASSAY OF IRON: CPT

## 2020-07-20 PROCEDURE — 80048 BASIC METABOLIC PNL TOTAL CA: CPT

## 2020-07-20 PROCEDURE — 82728 ASSAY OF FERRITIN: CPT

## 2020-07-20 PROCEDURE — 80076 HEPATIC FUNCTION PANEL: CPT

## 2020-07-20 PROCEDURE — 84443 ASSAY THYROID STIM HORMONE: CPT

## 2020-07-21 ENCOUNTER — TELEPHONE (OUTPATIENT)
Dept: INTERNAL MEDICINE | Facility: CLINIC | Age: 30
End: 2020-07-21

## 2020-09-21 ENCOUNTER — PATIENT OUTREACH (OUTPATIENT)
Dept: ADMINISTRATIVE | Facility: OTHER | Age: 30
End: 2020-09-21

## 2020-09-22 ENCOUNTER — OFFICE VISIT (OUTPATIENT)
Dept: DERMATOLOGY | Facility: CLINIC | Age: 30
End: 2020-09-22
Payer: COMMERCIAL

## 2020-09-22 DIAGNOSIS — L30.9 HAND ECZEMA: Primary | ICD-10-CM

## 2020-09-22 DIAGNOSIS — L24.9 IRRITANT CONTACT DERMATITIS, UNSPECIFIED TRIGGER: ICD-10-CM

## 2020-09-22 PROCEDURE — 99999 PR PBB SHADOW E&M-EST. PATIENT-LVL III: ICD-10-PCS | Mod: PBBFAC,,, | Performed by: DERMATOLOGY

## 2020-09-22 PROCEDURE — 99202 PR OFFICE/OUTPT VISIT, NEW, LEVL II, 15-29 MIN: ICD-10-PCS | Mod: S$GLB,,, | Performed by: DERMATOLOGY

## 2020-09-22 PROCEDURE — 99999 PR PBB SHADOW E&M-EST. PATIENT-LVL III: CPT | Mod: PBBFAC,,, | Performed by: DERMATOLOGY

## 2020-09-22 PROCEDURE — 99202 OFFICE O/P NEW SF 15 MIN: CPT | Mod: S$GLB,,, | Performed by: DERMATOLOGY

## 2020-09-22 RX ORDER — TRIAMCINOLONE ACETONIDE 1 MG/G
OINTMENT TOPICAL
Qty: 80 G | Refills: 3 | Status: SHIPPED | OUTPATIENT
Start: 2020-09-22

## 2020-09-22 NOTE — LETTER
September 22, 2020      Radha Macias MD  [address]  [facility] [address]           [facility] [address] [address]  [address]  [facility] [address]  Phone: [phone]  Fax: [phone]          Patient: Tammy Bonner   MR Number: [id number]   YOB: 1990   Date of Visit: 9/22/2020       Dear Dr. Radha Macias:    Thank you for referring Tammy Bonner to me for evaluation. Attached you will find relevant portions of my assessment and plan of care.    If you have questions, please do not hesitate to call me. I look forward to following Tammy Bonner along with you.    Sincerely,    Thuy Alfaro MD    Enclosure  CC:  No Recipients    If you would like to receive this communication electronically, please contact [email or web address] or [phone] to request more information on [facility] access.    For providers and/or their staff who would like to refer a patient to [facility], please contact us through our one-stop-shop provider referral line, [facility] [place], at [phone].    If you feel you have received this communication in error or would no longer like to receive these types of communications, please e-mail [email or web address]

## 2020-09-22 NOTE — PATIENT INSTRUCTIONS
Hand eczema  -     triamcinolone acetonide 0.1% (KENALOG) 0.1 % ointment; AAA bid  Dispense: 80 g; Refill: 3  Recommend when washing hands use lukewarm water with gentle soap such as dove hand soap, pat dry do not rub then apply hand cream (O'james's working hands)    Recommend Elta MD So Silky Hand CREME or O'James's working handsafter hand washing and every hour while awake.    Recommend applying steroid ointment to hot spots or rough dry areas or red scaly areas, then apply vaseline jelly all over at night   Under white cotton gloves and can do same for feet under socks at nigth    Once clear, Recommend Vaseline jelly under white cotton gloves every night.         Irritant contact dermatitis, unspecified trigger  -     Ambulatory referral/consult to Dermatology  -     triamcinolone acetonide 0.1% (KENALOG) 0.1 % ointment; AAA bid  Dispense: 80 g; Refill: 3  Start dove senstive skin or eczema body wash by cerave  Start vanicream or cerave cream or eczema creamy oil  q pm-- after bathing and patting dry skin, apply two times a day at least if not 3x a day  Change to free and clear detergent (all, tide, purex)  Use warm water (no hot water)  If there are red inflammed areas apply steroid ointment at night      XEROSIS (DRY SKIN)        1. Definition    Xerosis is the term for dry skin.  We all have a natural oil coating over our skin produced by the skin oil glands.  If this oil is removed, the skin becomes dry which can lead to cracking, which can lead to inflammation.  Xerosis is usually a long-term problem that recurs often, especially in the winter.    2. Cause     Long hot baths or showers can remove our natural oil and lead to xerosis.  One should never take more than one bath or shower a day and for no longer than ten minutes.   Use of harsh soaps such as Zest, Dial, and Ivory can worsen and cause xerosis.   Cold winter weather worsens xerosis because the amount of moisture contained in cold air is much  less than the amount of moisture in warm air.    3. Treatment     Treatment is intended to restore the natural oil to your skin.  Keep the skin lubricated.     Do not take more than one bath or shower a day.  Use lukewarm water, not hot.  Hot water dries out the skin.     Use a gentle moisturizing soap such as Cetaphil soap, Oil of Olay, Dove, Basis, Ivory moisture care, Restoraderm cleanser.     When toweling dry, dont rub.  Blot the skin so there is still some water left on the skin.  You should apply a moisturizing cream to all of the skin such as Cerave cream, Cetaphil cream, Restoraderm or Eucerin Original Formula cream.   Alpha hydroxyacid lotions, i.e., AmLactin, also work very well for preventing dry skin, but may burn when used on inflamed or reddened skin.     If you like to swim during the winter months, you should not use soap when getting out of the pool.  When you have finished swimming, rinse off the chlorine with cool to warm water.  If this will be the only shower of the day, then you may use Cetaphil or another mild soap to cleanse your skin.  After the shower, apply a moisturizing cream to all of the skin as above.        [facility] [place] [address]/ [phone] FAX/ [email or web address]

## 2020-09-22 NOTE — PROGRESS NOTES
Subjective:       Patient ID:  Tammy Bonner is a 30 y.o. female who presents for   Chief Complaint   Patient presents with    Itching     hands and feet     Pt reports several months of itchy dry irritated hands and feet, denies other skin affected. Uses dove soap in shower and bath and body works eucalyptus soap to wash hands usually.     Itching - Initial  Affected locations: right hand, left hand, right foot and left foot  Duration: 7 months  Signs / symptoms: itching  Severity: mild  Timing: constant  Aggravated by: nothing  Relieving factors/Treatments tried: OTC hydrocortisone  Improvement on treatment: no relief        Review of Systems   Constitutional: Negative for fever, chills and fatigue.   Skin: Positive for itching and daily sunscreen use. Negative for recent sunburn and wears hat.   Hematologic/Lymphatic: Bruises/bleeds easily.        Objective:    Physical Exam   Constitutional: She appears well-developed and well-nourished.   Neurological: She is alert and oriented to person, place, and time.   Psychiatric: She has a normal mood and affect.   Skin:   Areas Examined (abnormalities noted in diagram):   RUE Inspected  LUE Inspection Performed  RLE Inspected  LLE Inspection Performed                 Diagram Legend     Erythematous scaling macule/papule c/w actinic keratosis       Vascular papule c/w angioma      Pigmented verrucoid papule/plaque c/w seborrheic keratosis      Yellow umbilicated papule c/w sebaceous hyperplasia      Irregularly shaped tan macule c/w lentigo     1-2 mm smooth white papules consistent with Milia      Movable subcutaneous cyst with punctum c/w epidermal inclusion cyst      Subcutaneous movable cyst c/w pilar cyst      Firm pink to brown papule c/w dermatofibroma      Pedunculated fleshy papule(s) c/w skin tag(s)      Evenly pigmented macule c/w junctional nevus     Mildly variegated pigmented, slightly irregular-bordered macule c/w mildly atypical nevus      Flesh  colored to evenly pigmented papule c/w intradermal nevus       Pink pearly papule/plaque c/w basal cell carcinoma      Erythematous hyperkeratotic cursted plaque c/w SCC      Surgical scar with no sign of skin cancer recurrence      Open and closed comedones      Inflammatory papules and pustules      Verrucoid papule consistent consistent with wart     Erythematous eczematous patches and plaques     Dystrophic onycholytic nail with subungual debris c/w onychomycosis     Umbilicated papule    Erythematous-base heme-crusted tan verrucoid plaque consistent with inflamed seborrheic keratosis     Erythematous Silvery Scaling Plaque c/w Psoriasis     See annotation      Assessment / Plan:        Hand eczema  -     triamcinolone acetonide 0.1% (KENALOG) 0.1 % ointment; AAA bid  Dispense: 80 g; Refill: 3  Recommend when washing hands use lukewarm water with gentle soap such as dove hand soap, pat dry do not rub then apply hand cream (O'james's working hands)    Recommend Elta MD So Silky Hand CREME or O'James's working hands q hand washing and q hour while awake.    Recommend applying steroid ointment to hot spots or rough dry areas or red scaly areas, then apply vaseline jelly all over at night   Under white cotton gloves    Once clear, Recommend Vaseline jelly under white cotton gloves every night.         Irritant contact dermatitis, unspecified trigger  -     Ambulatory referral/consult to Dermatology  -     triamcinolone acetonide 0.1% (KENALOG) 0.1 % ointment; AAA bid  Dispense: 80 g; Refill: 3  Start dove senstive skin or eczema body wash by cerave  Start vanicream or cerave cream or eczema creamy oil  q pm-- after bathing and patting dry skin, apply two times a day at least if not 3x a day  Change to free and clear detergent (all, tide, purex)  Use warm water (no hot water)  If there are red inflammed areas apply steroid ointment at night             No follow-ups on file.

## 2020-10-20 ENCOUNTER — IMMUNIZATION (OUTPATIENT)
Dept: INTERNAL MEDICINE | Facility: CLINIC | Age: 30
End: 2020-10-20
Payer: COMMERCIAL

## 2020-10-20 PROCEDURE — 90471 IMMUNIZATION ADMIN: CPT | Mod: S$GLB,,, | Performed by: INTERNAL MEDICINE

## 2020-10-20 PROCEDURE — 90471 FLU VACCINE (QUAD) GREATER THAN OR EQUAL TO 3YO PRESERVATIVE FREE IM: ICD-10-PCS | Mod: S$GLB,,, | Performed by: INTERNAL MEDICINE

## 2020-10-20 PROCEDURE — 90686 IIV4 VACC NO PRSV 0.5 ML IM: CPT | Mod: S$GLB,,, | Performed by: INTERNAL MEDICINE

## 2020-10-20 PROCEDURE — 90686 FLU VACCINE (QUAD) GREATER THAN OR EQUAL TO 3YO PRESERVATIVE FREE IM: ICD-10-PCS | Mod: S$GLB,,, | Performed by: INTERNAL MEDICINE

## 2020-12-07 ENCOUNTER — PATIENT MESSAGE (OUTPATIENT)
Dept: PSYCHIATRY | Facility: CLINIC | Age: 30
End: 2020-12-07

## 2020-12-15 ENCOUNTER — OFFICE VISIT (OUTPATIENT)
Dept: DERMATOLOGY | Facility: CLINIC | Age: 30
End: 2020-12-15
Payer: COMMERCIAL

## 2020-12-15 DIAGNOSIS — L30.1 DYSHIDROTIC ECZEMA: Primary | ICD-10-CM

## 2020-12-15 DIAGNOSIS — L81.9 POSTINFLAMMATORY PIGMENTARY CHANGES: ICD-10-CM

## 2020-12-15 PROCEDURE — 99214 PR OFFICE/OUTPT VISIT, EST, LEVL IV, 30-39 MIN: ICD-10-PCS | Mod: S$GLB,,, | Performed by: DERMATOLOGY

## 2020-12-15 PROCEDURE — 99214 OFFICE O/P EST MOD 30 MIN: CPT | Mod: S$GLB,,, | Performed by: DERMATOLOGY

## 2020-12-15 PROCEDURE — 99999 PR PBB SHADOW E&M-EST. PATIENT-LVL III: ICD-10-PCS | Mod: PBBFAC,,, | Performed by: DERMATOLOGY

## 2020-12-15 PROCEDURE — 1126F AMNT PAIN NOTED NONE PRSNT: CPT | Mod: S$GLB,,, | Performed by: DERMATOLOGY

## 2020-12-15 PROCEDURE — 1126F PR PAIN SEVERITY QUANTIFIED, NO PAIN PRESENT: ICD-10-PCS | Mod: S$GLB,,, | Performed by: DERMATOLOGY

## 2020-12-15 PROCEDURE — 99999 PR PBB SHADOW E&M-EST. PATIENT-LVL III: CPT | Mod: PBBFAC,,, | Performed by: DERMATOLOGY

## 2020-12-15 NOTE — PATIENT INSTRUCTIONS
Dyshidrotic eczema  - start certain dri over the counter anti-perisperant to bone dry skin at night, then moisturize  Continue:Using TAC 0.1% ointment to flared areas, using warm water, patting skin dry, moisturizing with Vanicream after hand washing, then apply vaseline with gloves and socks at night      Postinflammatory pigmentary changes  - Use silicone scar gel to affected areas, counseled these areas will go away with time  -Can apply silicone gel over the counter to scar such as scar away gel/pads or strataderm blue film forming dressing with silicone to help minimize scar.   - make sure to use SPF 30 or above to prevent hyperpigmentation (darkening) of scar

## 2020-12-15 NOTE — PROGRESS NOTES
Subjective:       Patient ID:  Tammy Bonner is a 30 y.o. female who presents for   Chief Complaint   Patient presents with    Follow-up     eczema is improving      Follow-up - Follow-up  Symptom course: improving  Affected locations: right hand, left hand, right foot and left foot  Signs / symptoms: asymptomatic  Severity: mild    Using TAC 0.1% ointment to flared areas, using warm water, moisturizing with VAnicream after hand washing, then apply vaseline with gloves and socks at night  Concerned for scars on hands/feet after active areas have gone away, tried mederma, without relief    Review of Systems   Constitutional: Negative for fever, chills and fatigue.   Skin: Positive for daily sunscreen use. Negative for recent sunburn and wears hat.   Hematologic/Lymphatic: Bruises/bleeds easily.        Objective:    Physical Exam   Constitutional: She appears well-developed and well-nourished.   Neurological: She is alert and oriented to person, place, and time.   Psychiatric: She has a normal mood and affect.   Skin:   Areas Examined (abnormalities noted in diagram):   RLE Inspected  LLE Inspection Performed  Nails and Digits Inspection Performed                 Diagram Legend     Erythematous scaling macule/papule c/w actinic keratosis       Vascular papule c/w angioma      Pigmented verrucoid papule/plaque c/w seborrheic keratosis      Yellow umbilicated papule c/w sebaceous hyperplasia      Irregularly shaped tan macule c/w lentigo     1-2 mm smooth white papules consistent with Milia      Movable subcutaneous cyst with punctum c/w epidermal inclusion cyst      Subcutaneous movable cyst c/w pilar cyst      Firm pink to brown papule c/w dermatofibroma      Pedunculated fleshy papule(s) c/w skin tag(s)      Evenly pigmented macule c/w junctional nevus     Mildly variegated pigmented, slightly irregular-bordered macule c/w mildly atypical nevus      Flesh colored to evenly pigmented papule c/w intradermal  nevus       Pink pearly papule/plaque c/w basal cell carcinoma      Erythematous hyperkeratotic cursted plaque c/w SCC      Surgical scar with no sign of skin cancer recurrence      Open and closed comedones      Inflammatory papules and pustules      Verrucoid papule consistent consistent with wart     Erythematous eczematous patches and plaques     Dystrophic onycholytic nail with subungual debris c/w onychomycosis     Umbilicated papule    Erythematous-base heme-crusted tan verrucoid plaque consistent with inflamed seborrheic keratosis     Erythematous Silvery Scaling Plaque c/w Psoriasis     See annotation      Assessment / Plan:        Dyshidrotic eczema  - start certain dri over the counter anti-perisperant to bone dry skin at night, then moisturize  Continue:Using TAC 0.1% ointment to flared areas, using warm water, patting skin dry, moisturizing with Vanicream after hand washing, then apply vaseline with gloves and socks at night      Postinflammatory pigmentary changes  - Use silicone scar gel to affected areas, counseled these areas will go away with time  -Can apply silicone gel over the counter to scar such as scar away gel/pads or strataderm blue film forming dressing with silicone to help minimize scar.   - make sure to use SPF 30 or above to prevent hyperpigmentation (darkening) of scar           Follow up in about 6 months (around 6/15/2021) for hand/foot eczema.

## 2020-12-29 ENCOUNTER — OFFICE VISIT (OUTPATIENT)
Dept: PSYCHIATRY | Facility: CLINIC | Age: 30
End: 2020-12-29
Payer: COMMERCIAL

## 2020-12-29 DIAGNOSIS — F41.1 GAD (GENERALIZED ANXIETY DISORDER): ICD-10-CM

## 2020-12-29 DIAGNOSIS — F84.5 ASPERGER SYNDROME: Primary | ICD-10-CM

## 2020-12-29 PROCEDURE — 99214 PR OFFICE/OUTPT VISIT, EST, LEVL IV, 30-39 MIN: ICD-10-PCS | Mod: 95,,, | Performed by: PSYCHIATRY & NEUROLOGY

## 2020-12-29 PROCEDURE — 99214 OFFICE O/P EST MOD 30 MIN: CPT | Mod: 95,,, | Performed by: PSYCHIATRY & NEUROLOGY

## 2020-12-29 RX ORDER — ESCITALOPRAM OXALATE 20 MG/1
20 TABLET ORAL DAILY
Qty: 90 TABLET | Refills: 1 | Status: SHIPPED | OUTPATIENT
Start: 2020-12-29 | End: 2021-06-29 | Stop reason: SDUPTHER

## 2020-12-29 NOTE — PROGRESS NOTES
The patient location is:  home  The chief complaint leading to consultation is:  Anxiety  Visit type: audiovisual  Total time spent with patient:  20 minutes  Each patient to whom he or she provides medical services by telemedicine is:  (1) informed of the relationship between the physician and patient and the respective role of any other health care provider with respect to management of the patient; and (2) notified that he or she may decline to receive medical services by telemedicine and may withdraw from such care at any time.    Notes:     Outpatient Psychiatry Follow-Up Visit (MD/NP)    12/29/2020    Clinical Status of Patient:  Outpatient (Ambulatory)    Chief Complaint:  Tammy Bonner is a 30 y.o. female who presents today for follow-up of anxiety primarily, hx of low mood, stuttering, asperger's associated issues        Met with patient.      Interval History and Content of Current Session:  Interim Events/Subjective Report/Content of Current Session:   Pt reports she has been good.  Working from home still. Has worked some overtime and it was slow.  Generally thinks he rmood has been good.  Not often irritable except for around menstruation.  She has been going the gym, takes spinning classes at [employer] a few days a week.  Mom has joined as well.  Denies recent desire for self harm or SI.  Eating well.  Sleep has been good as well.    Grandmother passed away a week ago.      Forgot to get rx refilled and had some withdrawal, was extra-sensitive and had some diarrhea.      She desires no medication changes.        Psychotherapy:  · Target symptoms: mood swings, impulsivity, SI  · Why chosen therapy is appropriate versus another modality: relevant to diagnosis, patient responds to this modality  · Outcome monitoring methods: self-report, observation  · Therapeutic intervention type: supportive psychotherapy  · Topics discussed/themes: work stress, difficulty managing affect in interpersonal  relationships, building skills sets for symptom management, symptom recognition.    · The patient's response to the intervention is accepting. The patient's progress toward treatment goals is good.   · Duration of intervention: 5 mins    Review of Systems   Constitutional: Negative for chills and fever.   Respiratory: Negative for cough, shortness of breath and wheezing.    Cardiovascular: Negative for chest pain and palpitations.   Gastrointestinal: Negative for abdominal pain, diarrhea and vomiting.   Genitourinary: Negative for dysuria.         Past Medical, Family and Social History: The patient's past medical, family and social history have been reviewed and updated as appropriate within the electronic medical record - see encounter notes.    Compliance: yes    Side effects: None    Risk Parameters:  Patient reports no suicidal ideation  Patient reports no homicidal ideation  Patient reports no self-injurious behavior  Patient reports no violent behavior    Exam (detailed: at least 9 elements; comprehensive: all 15 elements)   Constitutional  Vitals: 1 Most recent vital signs, dated less than 90 days prior to this appointment, were reviewed.    There were no vitals filed for this visit.     General:  age appropriate, casually dressed, neatly groomed     Musculoskeletal  Muscle Strength/Tone:  no dyskinesia, no tic   Gait & Station:  not observed     Psychiatric  Speech:  clearly audible, verbal, not pressured   Mood:   Affect:  euthymic  congruent and appropriate   Thought Process:  logical   Associations:  intact   Thought Content:  normal, no suicidality, no homicidality, delusions, or paranoia   Insight:   Judgement:  fair  adequate to circumstances   Orientation:  grossly intact   Memory: intact for content of interview   Language: grossly intact   Attention Span & Concentration:  able to focus   Fund of Knowledge:  intact and appropriate to age and level of education     Assessment and Diagnosis    Status/Progress: Based on the examination today, the patient's problem(s) is/are well controlled.  New problems have been presented today.   Comorbidities are complicating management of the primary condition:Asperger's complicating her DORIS.  There are no active rule-out diagnoses for this patient at this time.    General Impression: 30 y.o. F with Asperger's and DORIS.  DORIS relatively well controlled currently.   Has some problems with anger at times, typically with parents.      Diagnosis:  Asperger's Disorder (299.80)  Generalized Anxiety Disorder (300.02).      Intervention/Counseling/Treatment Plan   · Continue lexapro 20 mg daily          Return to Clinic: 6 months

## 2021-03-29 ENCOUNTER — IMMUNIZATION (OUTPATIENT)
Dept: PRIMARY CARE CLINIC | Facility: CLINIC | Age: 31
End: 2021-03-29
Payer: COMMERCIAL

## 2021-03-29 DIAGNOSIS — Z23 NEED FOR VACCINATION: Primary | ICD-10-CM

## 2021-03-29 PROCEDURE — 0011A PR IMMUNIZ ADMIN, SARS-COV-2 COVID-19 VACC, 100MCG/0.5ML, 1ST DOSE: ICD-10-PCS | Mod: CV19,S$GLB,, | Performed by: INTERNAL MEDICINE

## 2021-03-29 PROCEDURE — 0011A PR IMMUNIZ ADMIN, SARS-COV-2 COVID-19 VACC, 100MCG/0.5ML, 1ST DOSE: CPT | Mod: CV19,S$GLB,, | Performed by: INTERNAL MEDICINE

## 2021-03-29 PROCEDURE — 91301 PR SARS-COV-2 COVID-19 VACCINE, NO PRSV, 100MCG/0.5ML, IM: CPT | Mod: S$GLB,,, | Performed by: INTERNAL MEDICINE

## 2021-03-29 PROCEDURE — 91301 PR SARS-COV-2 COVID-19 VACCINE, NO PRSV, 100MCG/0.5ML, IM: ICD-10-PCS | Mod: S$GLB,,, | Performed by: INTERNAL MEDICINE

## 2021-03-29 RX ADMIN — Medication 0.5 ML: at 04:03

## 2021-04-28 ENCOUNTER — IMMUNIZATION (OUTPATIENT)
Dept: PRIMARY CARE CLINIC | Facility: CLINIC | Age: 31
End: 2021-04-28
Payer: COMMERCIAL

## 2021-04-28 DIAGNOSIS — Z23 NEED FOR VACCINATION: Primary | ICD-10-CM

## 2021-04-28 PROCEDURE — 0012A PR IMMUNIZ ADMIN, SARS-COV-2 COVID-19 VACC, 100MCG/0.5ML, 2ND DOSE: ICD-10-PCS | Mod: CV19,S$GLB,, | Performed by: INTERNAL MEDICINE

## 2021-04-28 PROCEDURE — 91301 PR SARS-COV-2 COVID-19 VACCINE, NO PRSV, 100MCG/0.5ML, IM: ICD-10-PCS | Mod: S$GLB,,, | Performed by: INTERNAL MEDICINE

## 2021-04-28 PROCEDURE — 91301 PR SARS-COV-2 COVID-19 VACCINE, NO PRSV, 100MCG/0.5ML, IM: CPT | Mod: S$GLB,,, | Performed by: INTERNAL MEDICINE

## 2021-04-28 PROCEDURE — 0012A PR IMMUNIZ ADMIN, SARS-COV-2 COVID-19 VACC, 100MCG/0.5ML, 2ND DOSE: CPT | Mod: CV19,S$GLB,, | Performed by: INTERNAL MEDICINE

## 2021-04-28 RX ADMIN — Medication 0.5 ML: at 07:04

## 2021-06-29 ENCOUNTER — OFFICE VISIT (OUTPATIENT)
Dept: PSYCHIATRY | Facility: CLINIC | Age: 31
End: 2021-06-29
Payer: COMMERCIAL

## 2021-06-29 DIAGNOSIS — F41.1 GAD (GENERALIZED ANXIETY DISORDER): ICD-10-CM

## 2021-06-29 DIAGNOSIS — F84.5 ASPERGER SYNDROME: Primary | ICD-10-CM

## 2021-06-29 PROCEDURE — 99214 PR OFFICE/OUTPT VISIT, EST, LEVL IV, 30-39 MIN: ICD-10-PCS | Mod: 95,,, | Performed by: PSYCHIATRY & NEUROLOGY

## 2021-06-29 PROCEDURE — 99214 OFFICE O/P EST MOD 30 MIN: CPT | Mod: 95,,, | Performed by: PSYCHIATRY & NEUROLOGY

## 2021-06-29 RX ORDER — ESCITALOPRAM OXALATE 20 MG/1
20 TABLET ORAL DAILY
Qty: 90 TABLET | Refills: 1 | Status: SHIPPED | OUTPATIENT
Start: 2021-06-29 | End: 2022-02-22 | Stop reason: SDUPTHER

## 2021-07-17 ENCOUNTER — PATIENT MESSAGE (OUTPATIENT)
Dept: INTERNAL MEDICINE | Facility: CLINIC | Age: 31
End: 2021-07-17

## 2021-08-15 ENCOUNTER — PATIENT MESSAGE (OUTPATIENT)
Dept: INTERNAL MEDICINE | Facility: CLINIC | Age: 31
End: 2021-08-15

## 2021-08-18 DIAGNOSIS — Z00.00 ANNUAL PHYSICAL EXAM: Primary | ICD-10-CM

## 2021-08-19 ENCOUNTER — TELEPHONE (OUTPATIENT)
Dept: INTERNAL MEDICINE | Facility: CLINIC | Age: 31
End: 2021-08-19

## 2021-08-20 ENCOUNTER — TELEPHONE (OUTPATIENT)
Dept: INTERNAL MEDICINE | Facility: CLINIC | Age: 31
End: 2021-08-20

## 2021-08-23 ENCOUNTER — LAB VISIT (OUTPATIENT)
Dept: LAB | Facility: HOSPITAL | Age: 31
End: 2021-08-23
Attending: INTERNAL MEDICINE
Payer: COMMERCIAL

## 2021-08-23 DIAGNOSIS — Z00.00 ANNUAL PHYSICAL EXAM: ICD-10-CM

## 2021-08-23 LAB
BASOPHILS # BLD AUTO: 0.03 K/UL (ref 0–0.2)
BASOPHILS NFR BLD: 0.4 % (ref 0–1.9)
DIFFERENTIAL METHOD: NORMAL
EOSINOPHIL # BLD AUTO: 0.1 K/UL (ref 0–0.5)
EOSINOPHIL NFR BLD: 1.7 % (ref 0–8)
ERYTHROCYTE [DISTWIDTH] IN BLOOD BY AUTOMATED COUNT: 12.7 % (ref 11.5–14.5)
HCT VFR BLD AUTO: 38.5 % (ref 37–48.5)
HGB BLD-MCNC: 12.9 G/DL (ref 12–16)
IMM GRANULOCYTES # BLD AUTO: 0.02 K/UL (ref 0–0.04)
IMM GRANULOCYTES NFR BLD AUTO: 0.3 % (ref 0–0.5)
LYMPHOCYTES # BLD AUTO: 1.8 K/UL (ref 1–4.8)
LYMPHOCYTES NFR BLD: 24.2 % (ref 18–48)
MCH RBC QN AUTO: 29.7 PG (ref 27–31)
MCHC RBC AUTO-ENTMCNC: 33.5 G/DL (ref 32–36)
MCV RBC AUTO: 89 FL (ref 82–98)
MONOCYTES # BLD AUTO: 0.5 K/UL (ref 0.3–1)
MONOCYTES NFR BLD: 7 % (ref 4–15)
NEUTROPHILS # BLD AUTO: 4.8 K/UL (ref 1.8–7.7)
NEUTROPHILS NFR BLD: 66.4 % (ref 38–73)
NRBC BLD-RTO: 0 /100 WBC
PLATELET # BLD AUTO: 353 K/UL (ref 150–450)
PMV BLD AUTO: 9.5 FL (ref 9.2–12.9)
RBC # BLD AUTO: 4.35 M/UL (ref 4–5.4)
TSH SERPL DL<=0.005 MIU/L-ACNC: 1.47 UIU/ML (ref 0.4–4)
WBC # BLD AUTO: 7.27 K/UL (ref 3.9–12.7)

## 2021-08-23 PROCEDURE — 36415 COLL VENOUS BLD VENIPUNCTURE: CPT | Performed by: INTERNAL MEDICINE

## 2021-08-23 PROCEDURE — 85025 COMPLETE CBC W/AUTO DIFF WBC: CPT | Performed by: INTERNAL MEDICINE

## 2021-08-23 PROCEDURE — 80048 BASIC METABOLIC PNL TOTAL CA: CPT | Performed by: INTERNAL MEDICINE

## 2021-08-23 PROCEDURE — 83036 HEMOGLOBIN GLYCOSYLATED A1C: CPT | Performed by: INTERNAL MEDICINE

## 2021-08-23 PROCEDURE — 84443 ASSAY THYROID STIM HORMONE: CPT | Performed by: INTERNAL MEDICINE

## 2021-08-23 PROCEDURE — 80076 HEPATIC FUNCTION PANEL: CPT | Performed by: INTERNAL MEDICINE

## 2021-08-23 PROCEDURE — 80061 LIPID PANEL: CPT | Performed by: INTERNAL MEDICINE

## 2021-08-24 LAB
ALBUMIN SERPL BCP-MCNC: 3.5 G/DL (ref 3.5–5.2)
ALP SERPL-CCNC: 75 U/L (ref 55–135)
ALT SERPL W/O P-5'-P-CCNC: 13 U/L (ref 10–44)
ANION GAP SERPL CALC-SCNC: 11 MMOL/L (ref 8–16)
AST SERPL-CCNC: 18 U/L (ref 10–40)
BILIRUB DIRECT SERPL-MCNC: 0.2 MG/DL (ref 0.1–0.3)
BILIRUB SERPL-MCNC: 0.5 MG/DL (ref 0.1–1)
BUN SERPL-MCNC: 10 MG/DL (ref 6–20)
CALCIUM SERPL-MCNC: 9.8 MG/DL (ref 8.7–10.5)
CHLORIDE SERPL-SCNC: 104 MMOL/L (ref 95–110)
CHOLEST SERPL-MCNC: 193 MG/DL (ref 120–199)
CHOLEST/HDLC SERPL: 2.9 {RATIO} (ref 2–5)
CO2 SERPL-SCNC: 22 MMOL/L (ref 23–29)
CREAT SERPL-MCNC: 0.8 MG/DL (ref 0.5–1.4)
EST. GFR  (AFRICAN AMERICAN): >60 ML/MIN/1.73 M^2
EST. GFR  (NON AFRICAN AMERICAN): >60 ML/MIN/1.73 M^2
ESTIMATED AVG GLUCOSE: 97 MG/DL (ref 68–131)
GLUCOSE SERPL-MCNC: 79 MG/DL (ref 70–110)
HBA1C MFR BLD: 5 % (ref 4–5.6)
HDLC SERPL-MCNC: 67 MG/DL (ref 40–75)
HDLC SERPL: 34.7 % (ref 20–50)
LDLC SERPL CALC-MCNC: 92.6 MG/DL (ref 63–159)
NONHDLC SERPL-MCNC: 126 MG/DL
POTASSIUM SERPL-SCNC: 4.1 MMOL/L (ref 3.5–5.1)
PROT SERPL-MCNC: 6.9 G/DL (ref 6–8.4)
SODIUM SERPL-SCNC: 137 MMOL/L (ref 136–145)
TRIGL SERPL-MCNC: 167 MG/DL (ref 30–150)

## 2021-08-25 ENCOUNTER — OFFICE VISIT (OUTPATIENT)
Dept: INTERNAL MEDICINE | Facility: CLINIC | Age: 31
End: 2021-08-25
Payer: COMMERCIAL

## 2021-08-25 VITALS
OXYGEN SATURATION: 98 % | HEART RATE: 90 BPM | HEIGHT: 59 IN | DIASTOLIC BLOOD PRESSURE: 72 MMHG | BODY MASS INDEX: 32.36 KG/M2 | SYSTOLIC BLOOD PRESSURE: 118 MMHG | WEIGHT: 160.5 LBS

## 2021-08-25 DIAGNOSIS — F41.9 ANXIETY: ICD-10-CM

## 2021-08-25 DIAGNOSIS — D24.9 FIBROADENOMA OF BREAST, UNSPECIFIED LATERALITY: ICD-10-CM

## 2021-08-25 DIAGNOSIS — Z00.00 ANNUAL PHYSICAL EXAM: Primary | ICD-10-CM

## 2021-08-25 PROCEDURE — 99999 PR PBB SHADOW E&M-EST. PATIENT-LVL IV: ICD-10-PCS | Mod: PBBFAC,,, | Performed by: INTERNAL MEDICINE

## 2021-08-25 PROCEDURE — 99999 PR PBB SHADOW E&M-EST. PATIENT-LVL IV: CPT | Mod: PBBFAC,,, | Performed by: INTERNAL MEDICINE

## 2021-08-25 PROCEDURE — 99395 PREV VISIT EST AGE 18-39: CPT | Mod: S$GLB,,, | Performed by: INTERNAL MEDICINE

## 2021-08-25 PROCEDURE — 99395 PR PREVENTIVE VISIT,EST,18-39: ICD-10-PCS | Mod: S$GLB,,, | Performed by: INTERNAL MEDICINE

## 2021-09-01 ENCOUNTER — PATIENT MESSAGE (OUTPATIENT)
Dept: PSYCHIATRY | Facility: CLINIC | Age: 31
End: 2021-09-01

## 2021-09-20 ENCOUNTER — PATIENT MESSAGE (OUTPATIENT)
Dept: INTERNAL MEDICINE | Facility: CLINIC | Age: 31
End: 2021-09-20

## 2021-09-21 ENCOUNTER — OFFICE VISIT (OUTPATIENT)
Dept: URGENT CARE | Facility: CLINIC | Age: 31
End: 2021-09-21
Payer: COMMERCIAL

## 2021-09-21 VITALS
HEIGHT: 59 IN | HEART RATE: 104 BPM | DIASTOLIC BLOOD PRESSURE: 76 MMHG | WEIGHT: 165 LBS | OXYGEN SATURATION: 98 % | TEMPERATURE: 99 F | RESPIRATION RATE: 16 BRPM | SYSTOLIC BLOOD PRESSURE: 128 MMHG | BODY MASS INDEX: 33.26 KG/M2

## 2021-09-21 DIAGNOSIS — R19.7 DIARRHEA OF PRESUMED INFECTIOUS ORIGIN: Primary | ICD-10-CM

## 2021-09-21 LAB
CTP QC/QA: YES
SARS-COV-2 RDRP RESP QL NAA+PROBE: NEGATIVE

## 2021-09-21 PROCEDURE — 99214 OFFICE O/P EST MOD 30 MIN: CPT | Mod: S$GLB,CS,, | Performed by: STUDENT IN AN ORGANIZED HEALTH CARE EDUCATION/TRAINING PROGRAM

## 2021-09-21 PROCEDURE — U0002: ICD-10-PCS | Mod: QW,S$GLB,, | Performed by: STUDENT IN AN ORGANIZED HEALTH CARE EDUCATION/TRAINING PROGRAM

## 2021-09-21 PROCEDURE — 99214 PR OFFICE/OUTPT VISIT, EST, LEVL IV, 30-39 MIN: ICD-10-PCS | Mod: S$GLB,CS,, | Performed by: STUDENT IN AN ORGANIZED HEALTH CARE EDUCATION/TRAINING PROGRAM

## 2021-09-21 PROCEDURE — U0002 COVID-19 LAB TEST NON-CDC: HCPCS | Mod: QW,S$GLB,, | Performed by: STUDENT IN AN ORGANIZED HEALTH CARE EDUCATION/TRAINING PROGRAM

## 2021-09-21 RX ORDER — ONDANSETRON 4 MG/1
4 TABLET, FILM COATED ORAL EVERY 6 HOURS PRN
Qty: 12 TABLET | Refills: 0 | Status: SHIPPED | OUTPATIENT
Start: 2021-09-21 | End: 2021-09-24

## 2021-09-21 RX ORDER — CIPROFLOXACIN 500 MG/1
500 TABLET ORAL EVERY 12 HOURS
Qty: 6 TABLET | Refills: 0 | Status: SHIPPED | OUTPATIENT
Start: 2021-09-21 | End: 2021-09-24

## 2021-10-05 ENCOUNTER — IMMUNIZATION (OUTPATIENT)
Dept: INTERNAL MEDICINE | Facility: CLINIC | Age: 31
End: 2021-10-05
Payer: COMMERCIAL

## 2021-10-05 PROCEDURE — 90686 IIV4 VACC NO PRSV 0.5 ML IM: CPT | Mod: S$GLB,,, | Performed by: INTERNAL MEDICINE

## 2021-10-05 PROCEDURE — 90686 FLU VACCINE (QUAD) GREATER THAN OR EQUAL TO 3YO PRESERVATIVE FREE IM: ICD-10-PCS | Mod: S$GLB,,, | Performed by: INTERNAL MEDICINE

## 2021-10-05 PROCEDURE — 90471 IMMUNIZATION ADMIN: CPT | Mod: S$GLB,,, | Performed by: INTERNAL MEDICINE

## 2021-10-05 PROCEDURE — 90471 FLU VACCINE (QUAD) GREATER THAN OR EQUAL TO 3YO PRESERVATIVE FREE IM: ICD-10-PCS | Mod: S$GLB,,, | Performed by: INTERNAL MEDICINE

## 2021-10-26 ENCOUNTER — PATIENT MESSAGE (OUTPATIENT)
Dept: INTERNAL MEDICINE | Facility: CLINIC | Age: 31
End: 2021-10-26
Payer: COMMERCIAL

## 2021-10-27 ENCOUNTER — OFFICE VISIT (OUTPATIENT)
Dept: INTERNAL MEDICINE | Facility: CLINIC | Age: 31
End: 2021-10-27
Payer: COMMERCIAL

## 2021-10-27 ENCOUNTER — TELEPHONE (OUTPATIENT)
Dept: INTERNAL MEDICINE | Facility: CLINIC | Age: 31
End: 2021-10-27
Payer: COMMERCIAL

## 2021-10-27 VITALS
BODY MASS INDEX: 32.13 KG/M2 | WEIGHT: 159.38 LBS | DIASTOLIC BLOOD PRESSURE: 82 MMHG | HEIGHT: 59 IN | OXYGEN SATURATION: 98 % | SYSTOLIC BLOOD PRESSURE: 118 MMHG | HEART RATE: 81 BPM | TEMPERATURE: 98 F

## 2021-10-27 DIAGNOSIS — R30.0 DYSURIA: Primary | ICD-10-CM

## 2021-10-27 DIAGNOSIS — R31.9 URINARY TRACT INFECTION WITH HEMATURIA, SITE UNSPECIFIED: ICD-10-CM

## 2021-10-27 DIAGNOSIS — N39.0 URINARY TRACT INFECTION WITH HEMATURIA, SITE UNSPECIFIED: ICD-10-CM

## 2021-10-27 LAB
BILIRUB SERPL-MCNC: ABNORMAL MG/DL
BLOOD URINE, POC: 205
CLARITY, POC UA: ABNORMAL
COLOR, POC UA: YELLOW
GLUCOSE UR QL STRIP: ABNORMAL
KETONES UR QL STRIP: ABNORMAL
LEUKOCYTE ESTERASE URINE, POC: ABNORMAL
NITRITE, POC UA: ABNORMAL
PH, POC UA: 6
PROTEIN, POC: ABNORMAL
SPECIFIC GRAVITY, POC UA: 1.01
UROBILINOGEN, POC UA: ABNORMAL

## 2021-10-27 PROCEDURE — 87086 URINE CULTURE/COLONY COUNT: CPT | Performed by: INTERNAL MEDICINE

## 2021-10-27 PROCEDURE — 87186 SC STD MICRODIL/AGAR DIL: CPT | Performed by: INTERNAL MEDICINE

## 2021-10-27 PROCEDURE — 99213 OFFICE O/P EST LOW 20 MIN: CPT | Mod: S$GLB,,, | Performed by: INTERNAL MEDICINE

## 2021-10-27 PROCEDURE — 81002 URINALYSIS NONAUTO W/O SCOPE: CPT | Mod: PBBFAC | Performed by: INTERNAL MEDICINE

## 2021-10-27 PROCEDURE — 99999 PR PBB SHADOW E&M-EST. PATIENT-LVL III: CPT | Mod: PBBFAC,,, | Performed by: INTERNAL MEDICINE

## 2021-10-27 PROCEDURE — 87088 URINE BACTERIA CULTURE: CPT | Performed by: INTERNAL MEDICINE

## 2021-10-27 PROCEDURE — 99999 PR PBB SHADOW E&M-EST. PATIENT-LVL III: ICD-10-PCS | Mod: PBBFAC,,, | Performed by: INTERNAL MEDICINE

## 2021-10-27 PROCEDURE — 99213 PR OFFICE/OUTPT VISIT, EST, LEVL III, 20-29 MIN: ICD-10-PCS | Mod: S$GLB,,, | Performed by: INTERNAL MEDICINE

## 2021-10-27 PROCEDURE — 87077 CULTURE AEROBIC IDENTIFY: CPT | Performed by: INTERNAL MEDICINE

## 2021-10-27 RX ORDER — CIPROFLOXACIN 500 MG/1
500 TABLET ORAL 2 TIMES DAILY
Qty: 14 TABLET | Refills: 0 | Status: SHIPPED | OUTPATIENT
Start: 2021-10-27 | End: 2022-01-03 | Stop reason: SDUPTHER

## 2021-11-03 ENCOUNTER — TELEPHONE (OUTPATIENT)
Dept: INTERNAL MEDICINE | Facility: CLINIC | Age: 31
End: 2021-11-03
Payer: COMMERCIAL

## 2021-11-03 ENCOUNTER — PATIENT MESSAGE (OUTPATIENT)
Dept: INTERNAL MEDICINE | Facility: CLINIC | Age: 31
End: 2021-11-03
Payer: COMMERCIAL

## 2021-11-03 DIAGNOSIS — N39.0 URINARY TRACT INFECTION WITHOUT HEMATURIA, SITE UNSPECIFIED: Primary | ICD-10-CM

## 2021-11-03 LAB — BACTERIA UR CULT: ABNORMAL

## 2021-11-23 ENCOUNTER — PATIENT MESSAGE (OUTPATIENT)
Dept: INTERNAL MEDICINE | Facility: CLINIC | Age: 31
End: 2021-11-23
Payer: COMMERCIAL

## 2021-12-06 ENCOUNTER — TELEPHONE (OUTPATIENT)
Dept: INTERNAL MEDICINE | Facility: CLINIC | Age: 31
End: 2021-12-06
Payer: COMMERCIAL

## 2021-12-11 ENCOUNTER — PATIENT MESSAGE (OUTPATIENT)
Dept: INTERNAL MEDICINE | Facility: CLINIC | Age: 31
End: 2021-12-11
Payer: COMMERCIAL

## 2021-12-14 ENCOUNTER — IMMUNIZATION (OUTPATIENT)
Dept: PHARMACY | Facility: CLINIC | Age: 31
End: 2021-12-14
Payer: COMMERCIAL

## 2021-12-14 DIAGNOSIS — Z23 NEED FOR VACCINATION: Primary | ICD-10-CM

## 2021-12-25 ENCOUNTER — PATIENT MESSAGE (OUTPATIENT)
Dept: INTERNAL MEDICINE | Facility: CLINIC | Age: 31
End: 2021-12-25
Payer: COMMERCIAL

## 2021-12-25 DIAGNOSIS — R30.0 DYSURIA: Primary | ICD-10-CM

## 2021-12-29 ENCOUNTER — LAB VISIT (OUTPATIENT)
Dept: LAB | Facility: HOSPITAL | Age: 31
End: 2021-12-29
Attending: INTERNAL MEDICINE
Payer: COMMERCIAL

## 2021-12-29 DIAGNOSIS — N39.0 URINARY TRACT INFECTION WITHOUT HEMATURIA, SITE UNSPECIFIED: ICD-10-CM

## 2021-12-29 DIAGNOSIS — R30.0 DYSURIA: ICD-10-CM

## 2021-12-29 PROCEDURE — 81001 URINALYSIS AUTO W/SCOPE: CPT | Mod: 91 | Performed by: PHYSICIAN ASSISTANT

## 2021-12-29 PROCEDURE — 87088 URINE BACTERIA CULTURE: CPT | Performed by: PHYSICIAN ASSISTANT

## 2021-12-29 PROCEDURE — 87088 URINE BACTERIA CULTURE: CPT | Mod: 59 | Performed by: INTERNAL MEDICINE

## 2021-12-29 PROCEDURE — 87186 SC STD MICRODIL/AGAR DIL: CPT | Mod: 59 | Performed by: INTERNAL MEDICINE

## 2021-12-29 PROCEDURE — 87086 URINE CULTURE/COLONY COUNT: CPT | Mod: 59 | Performed by: PHYSICIAN ASSISTANT

## 2021-12-29 PROCEDURE — 87077 CULTURE AEROBIC IDENTIFY: CPT | Mod: 59 | Performed by: INTERNAL MEDICINE

## 2021-12-29 PROCEDURE — 87186 SC STD MICRODIL/AGAR DIL: CPT | Performed by: PHYSICIAN ASSISTANT

## 2021-12-29 PROCEDURE — 81001 URINALYSIS AUTO W/SCOPE: CPT | Performed by: INTERNAL MEDICINE

## 2021-12-29 PROCEDURE — 87086 URINE CULTURE/COLONY COUNT: CPT | Performed by: INTERNAL MEDICINE

## 2021-12-29 PROCEDURE — 87077 CULTURE AEROBIC IDENTIFY: CPT | Performed by: PHYSICIAN ASSISTANT

## 2021-12-30 LAB
BACTERIA #/AREA URNS AUTO: ABNORMAL /HPF
BACTERIA #/AREA URNS AUTO: ABNORMAL /HPF
BILIRUB UR QL STRIP: NEGATIVE
BILIRUB UR QL STRIP: NEGATIVE
CLARITY UR REFRACT.AUTO: ABNORMAL
CLARITY UR REFRACT.AUTO: CLEAR
COLOR UR AUTO: YELLOW
COLOR UR AUTO: YELLOW
GLUCOSE UR QL STRIP: NEGATIVE
GLUCOSE UR QL STRIP: NEGATIVE
HGB UR QL STRIP: ABNORMAL
HGB UR QL STRIP: ABNORMAL
KETONES UR QL STRIP: NEGATIVE
KETONES UR QL STRIP: NEGATIVE
LEUKOCYTE ESTERASE UR QL STRIP: ABNORMAL
LEUKOCYTE ESTERASE UR QL STRIP: ABNORMAL
MICROSCOPIC COMMENT: ABNORMAL
MICROSCOPIC COMMENT: ABNORMAL
NITRITE UR QL STRIP: NEGATIVE
NITRITE UR QL STRIP: NEGATIVE
PH UR STRIP: 5 [PH] (ref 5–8)
PH UR STRIP: 5 [PH] (ref 5–8)
PROT UR QL STRIP: NEGATIVE
PROT UR QL STRIP: NEGATIVE
RBC #/AREA URNS AUTO: 2 /HPF (ref 0–4)
RBC #/AREA URNS AUTO: 2 /HPF (ref 0–4)
SP GR UR STRIP: 1.01 (ref 1–1.03)
SP GR UR STRIP: 1.01 (ref 1–1.03)
SQUAMOUS #/AREA URNS AUTO: 2 /HPF
SQUAMOUS #/AREA URNS AUTO: 4 /HPF
URN SPEC COLLECT METH UR: ABNORMAL
URN SPEC COLLECT METH UR: ABNORMAL
WBC #/AREA URNS AUTO: 21 /HPF (ref 0–5)
WBC #/AREA URNS AUTO: 22 /HPF (ref 0–5)

## 2022-01-03 ENCOUNTER — TELEPHONE (OUTPATIENT)
Dept: INTERNAL MEDICINE | Facility: CLINIC | Age: 32
End: 2022-01-03
Payer: COMMERCIAL

## 2022-01-03 DIAGNOSIS — N39.0 ACUTE UTI: Primary | ICD-10-CM

## 2022-01-03 DIAGNOSIS — A02.9 SALMONELLA INFECTION, UNSPECIFIED: ICD-10-CM

## 2022-01-03 RX ORDER — CIPROFLOXACIN 500 MG/1
500 TABLET ORAL 2 TIMES DAILY
Qty: 14 TABLET | Refills: 0 | Status: SHIPPED | OUTPATIENT
Start: 2022-01-03 | End: 2022-01-10

## 2022-01-03 NOTE — TELEPHONE ENCOUNTER
Spoke with pt   Discussed urine symptoms  And cullture with uti sympotms  She will start cipro and discussed  With salmonella and recurrent uti  Referral to urology order placed    Please assist pt in scheuduling pt urology appt  Next week or after

## 2022-01-04 LAB
BACTERIA UR CULT: ABNORMAL
BACTERIA UR CULT: ABNORMAL

## 2022-01-11 ENCOUNTER — OFFICE VISIT (OUTPATIENT)
Dept: SURGERY | Facility: CLINIC | Age: 32
End: 2022-01-11
Payer: COMMERCIAL

## 2022-01-11 VITALS
WEIGHT: 167 LBS | SYSTOLIC BLOOD PRESSURE: 117 MMHG | BODY MASS INDEX: 33.67 KG/M2 | HEART RATE: 100 BPM | DIASTOLIC BLOOD PRESSURE: 65 MMHG | HEIGHT: 59 IN

## 2022-01-11 DIAGNOSIS — D24.9 FIBROADENOMA OF BREAST, UNSPECIFIED LATERALITY: Primary | ICD-10-CM

## 2022-01-11 PROCEDURE — 99999 PR PBB SHADOW E&M-EST. PATIENT-LVL III: ICD-10-PCS | Mod: PBBFAC,,, | Performed by: PHYSICIAN ASSISTANT

## 2022-01-11 PROCEDURE — 99214 PR OFFICE/OUTPT VISIT, EST, LEVL IV, 30-39 MIN: ICD-10-PCS | Mod: S$GLB,,, | Performed by: PHYSICIAN ASSISTANT

## 2022-01-11 PROCEDURE — 99214 OFFICE O/P EST MOD 30 MIN: CPT | Mod: S$GLB,,, | Performed by: PHYSICIAN ASSISTANT

## 2022-01-11 PROCEDURE — 99999 PR PBB SHADOW E&M-EST. PATIENT-LVL III: CPT | Mod: PBBFAC,,, | Performed by: PHYSICIAN ASSISTANT

## 2022-01-11 NOTE — PROGRESS NOTES
[facility]  Department of Surgery    2022    REFERRING PROVIDER: Radha Macias Md  [address] [place] [address] Radha Macias MD  CHIEF COMPLAINT: bilateral fibroadenoma      Subjective:      Tammy Bonner is a 31 y.o. premenopausal female referred for evaluation of bilateral breast mass. Patient has a history of fibroadenomas. History of excision of R breast fibroadenoma by Dr. Chávez in . Followed at [facility] since 2018. Last imaging performed on 2020, US of L breast. Noted 21 mm mass at 6 OC without significant changes from previous study. Recommended to have 1 year follow imaging at 2 year ruben to assure stability. Patient is now overdue for that imaging. She states she feels like it has grown and also says her R breast has been more tender lately.     GYN History:  Age of menarche was 12.   Patient denies hormonal therapy.   Patient is .     Review of Systems: Denies any cough, chest pain or shortness of breath.  Denies any fever or chills.  See HPI/ Interval History for other systems reviewed.      Past Medical History:   Diagnosis Date    Abnormal cervical Papanicolaou smear     Re-pap    ALLERGIC RHINITIS     Anxiety     Asperger's syndrome     learning disability    Depression     Fibroadenoma of right breast     Glaucoma suspect      Past Surgical History:   Procedure Laterality Date    BREAST BIOPSY      Right - fiboradenoma    right submandibular siliathosis removal  2018    removal gland and stones     WISDOM TOOTH EXTRACTION       Current Outpatient Medications on File Prior to Visit   Medication Sig Dispense Refill    EScitalopram oxalate (LEXAPRO) 20 MG tablet Take 1 tablet (20 mg total) by mouth once daily. 90 tablet 1    FEMYNOR 0.25-35 mg-mcg per tablet TAKE 1 TABLET BY MOUTH EVERY DAY 84 tablet 4    multivitamin (THERAGRAN) per tablet Take 1 tablet by mouth once daily.      triamcinolone acetonide 0.1%  "(KENALOG) 0.1 % ointment AAA bid 80 g 3     No current facility-administered medications on file prior to visit.     Social History     Socioeconomic History    Marital status: Single   Tobacco Use    Smoking status: Never Smoker    Smokeless tobacco: Never Used   Substance and Sexual Activity    Alcohol use: No    Drug use: No    Sexual activity: Yes     Partners: Female     Birth control/protection: OCP     Social Determinants of Health     Financial Resource Strain: Low Risk     Difficulty of Paying Living Expenses: Not very hard   Food Insecurity: No Food Insecurity    Worried About Running Out of Food in the Last Year: Never true    Ran Out of Food in the Last Year: Never true   Transportation Needs: No Transportation Needs    Lack of Transportation (Medical): No    Lack of Transportation (Non-Medical): No   Physical Activity: Insufficiently Active    Days of Exercise per Week: 2 days    Minutes of Exercise per Session: 60 min   Stress: No Stress Concern Present    Feeling of Stress : Only a little   Social Connections: Unknown    Frequency of Communication with Friends and Family: Twice a week    Frequency of Social Gatherings with Friends and Family: Once a week    Active Member of Clubs or Organizations: No    Attends Club or Organization Meetings: Never    Marital Status: Never    Housing Stability: Low Risk     Unable to Pay for Housing in the Last Year: No    Number of Places Lived in the Last Year: 0    Unstable Housing in the Last Year: No     Family History   Problem Relation Age of Onset    Hypertension Mother     Diabetes Mother     Glaucoma Father     Ovarian cancer Other         mat GGM    Breast cancer Neg Hx     Colon cancer Neg Hx         Objective:        /65 (BP Location: Right arm, Patient Position: Sitting, BP Method: Medium (Automatic))   Pulse 100   Ht 4' 11" (1.499 m)   Wt 75.8 kg (167 lb)   LMP 12/30/2021 (Exact Date)   BMI 33.73 kg/m² "   Physical Exam   Vitals reviewed.  Constitutional: She is oriented to person, place, and time. She appears well-developed and well-nourished. No distress.   HENT:   Head: Normocephalic and atraumatic.   Eyes: Pupils are equal, round, and reactive to light.   Pulmonary/Chest: Effort normal and breath sounds normal. No respiratory distress. She exhibits no mass, no tenderness and no edema. Right breast exhibits no inverted nipple, no mass, no nipple discharge, no skin change and no tenderness. Left breast exhibits mass. Left breast exhibits no inverted nipple, no nipple discharge, no skin change and no tenderness.       Abdominal: Soft.   Neurological: She is alert and oriented to person, place, and time.   Skin: Skin is warm and dry. She is not diaphoretic.     Psychiatric: She has a normal mood and affect. Her behavior is normal.        Radiology review: Images personally reviewed by me in the clinic.    6/2/2020 US Left Breast:    Findings:     There is a 21 mm x 8 mm x 19 mm oval, parallel, hypoechoic mass with circumscribed margins seen in the left breast at 6 o'clock in the anterior depth, 1 cm from the nipple. Compared to the previous study, there are no significant changes.      Impression:  Left  Mass: Left breast 21 mm x 8 mm x 19 mm mass at the anterior 6 o'clock position. Assessment: 3 - Probably benign. Short Interval Follow-Up in 6 Months is recommended.      BI-RADS Category:   Overall: 3 - Probably Benign     Recommendation:  As this has been stable for 1 year, an additional follow-up ultrasound is recommended one year from now to establish 2 years of stability.         Assessment:      Tammy Bonner is a 31 y.o.  female who presents for follow up evaluation of L breast mass, fibroadenoma.      Plan:   1. On examination, there is a palpable mass that correlated to known finding on previous US in June of 2020.   2. Patient has an extensive history of bilateral fibroadenoma's.   3. Imagine in June  of 2020 noted stability and recommended 1 year follow up. Patient is over due will schedule. Patient is now 31 years old and per protocol a mmg will be performed. If stable, ok to see back PRN.   4. Patient verbalized understanding of plan. All questions asked and answered. Advised to call our office with any new or worsening sxs.       Stella Ricketts PA-C  Breast Surgery

## 2022-01-25 ENCOUNTER — HOSPITAL ENCOUNTER (OUTPATIENT)
Dept: RADIOLOGY | Facility: HOSPITAL | Age: 32
Discharge: HOME | End: 2022-01-25
Attending: PHYSICIAN ASSISTANT
Payer: COMMERCIAL

## 2022-01-25 VITALS — WEIGHT: 167 LBS | BODY MASS INDEX: 33.67 KG/M2 | HEIGHT: 59 IN

## 2022-01-25 DIAGNOSIS — D24.9 FIBROADENOMA OF BREAST, UNSPECIFIED LATERALITY: ICD-10-CM

## 2022-01-25 PROCEDURE — 77066 DX MAMMO INCL CAD BI: CPT | Mod: 26,,, | Performed by: RADIOLOGY

## 2022-01-25 PROCEDURE — 77062 BREAST TOMOSYNTHESIS BI: CPT | Mod: TC

## 2022-01-25 PROCEDURE — 77066 MAMMO DIGITAL DIAGNOSTIC BILAT WITH TOMO: ICD-10-PCS | Mod: 26,,, | Performed by: RADIOLOGY

## 2022-01-25 PROCEDURE — 77062 BREAST TOMOSYNTHESIS BI: CPT | Mod: 26,,, | Performed by: RADIOLOGY

## 2022-01-25 PROCEDURE — 77062 MAMMO DIGITAL DIAGNOSTIC BILAT WITH TOMO: ICD-10-PCS | Mod: 26,,, | Performed by: RADIOLOGY

## 2022-02-22 ENCOUNTER — OFFICE VISIT (OUTPATIENT)
Dept: PSYCHIATRY | Facility: CLINIC | Age: 32
End: 2022-02-22
Payer: COMMERCIAL

## 2022-02-22 DIAGNOSIS — F84.5 ASPERGER SYNDROME: Primary | ICD-10-CM

## 2022-02-22 DIAGNOSIS — F41.1 GAD (GENERALIZED ANXIETY DISORDER): ICD-10-CM

## 2022-02-22 PROCEDURE — 99213 OFFICE O/P EST LOW 20 MIN: CPT | Mod: 95,,, | Performed by: PSYCHIATRY & NEUROLOGY

## 2022-02-22 PROCEDURE — 99213 PR OFFICE/OUTPT VISIT, EST, LEVL III, 20-29 MIN: ICD-10-PCS | Mod: 95,,, | Performed by: PSYCHIATRY & NEUROLOGY

## 2022-02-22 RX ORDER — ESCITALOPRAM OXALATE 20 MG/1
20 TABLET ORAL DAILY
Qty: 90 TABLET | Refills: 1 | Status: SHIPPED | OUTPATIENT
Start: 2022-02-22 | End: 2022-08-29

## 2022-02-22 NOTE — PROGRESS NOTES
"The patient location is: [facility] on [address] in car  The chief complaint leading to consultation is: anxiety    Visit type: audiovisual    Face to Face time with patient: 22 minutes  26 minutes of total time spent on the encounter, which includes face to face time and non-face to face time preparing to see the patient (eg, review of tests), Obtaining and/or reviewing separately obtained history, Documenting clinical information in the electronic or other health record, Independently interpreting results (not separately reported) and communicating results to the patient/family/caregiver, or Care coordination (not separately reported).     Each patient to whom he or she provides medical services by telemedicine is:  (1) informed of the relationship between the physician and patient and the respective role of any other health care provider with respect to management of the patient; and (2) notified that he or she may decline to receive medical services by telemedicine and may withdraw from such care at any time.    Notes:       Outpatient Psychiatry Follow-Up Visit (MD/NP)    2/22/2022    Clinical Status of Patient:  Outpatient (Ambulatory)    Chief Complaint:  Tammy Bonner is a 31 y.o. female who presents today for follow-up of anxiety primarily, hx of low mood, stuttering, asperger's associated issues        Met with patient.      Interval History and Content of Current Session:  Interim Events/Subjective Report/Content of Current Session:   Pt reports she has been "hanging in there".  Work is good.  Still at [employer] and working from home.  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  Uses her fitbit to help with breathing exersizes.  Has been working overtime.  Tries to go to the gym or visit with brenda with free time.  Reports one of two crying spells in the last few months.  Denies anger or conflicts.  Reports no major " conflicts with her parents.  Reports she is sleeping pretty well.  Only worries are about work.  Denies any recent desiring to die.        Psychotherapy:  · Target symptoms: mood swings, impulsivity, SI  · Why chosen therapy is appropriate versus another modality: relevant to diagnosis, patient responds to this modality  · Outcome monitoring methods: self-report, observation  · Therapeutic intervention type: supportive psychotherapy  · Topics discussed/themes: work stress, building skills sets for symptom management, symptom recognition.    · The patient's response to the intervention is accepting. The patient's progress toward treatment goals is good.   · Duration of intervention: 5 mins    Review of Systems   Constitutional: Negative for chills, fever and weight loss.   Respiratory: Negative for cough, shortness of breath and wheezing.    Cardiovascular: Negative for chest pain and palpitations.   Gastrointestinal: Negative for abdominal pain, diarrhea and vomiting.   Genitourinary: Negative for dysuria.         Past Medical, Family and Social History: The patient's past medical, family and social history have been reviewed and updated as appropriate within the electronic medical record - see encounter notes.    Compliance: yes    Side effects: None    Risk Parameters:  Patient reports no suicidal ideation  Patient reports no homicidal ideation  Patient reports no self-injurious behavior  Patient reports no violent behavior    Exam (detailed: at least 9 elements; comprehensive: all 15 elements)   Constitutional  Vitals: 1 Most recent vital signs, dated less than 90 days prior to this appointment, were reviewed.    There were no vitals filed for this visit.     General:  age appropriate, casually dressed, neatly groomed     Musculoskeletal  Muscle Strength/Tone:  no dyskinesia, no tic   Gait & Station:  not observed     Psychiatric  Speech:  clearly audible, verbal, not pressured, quite verbal   Mood:   Affect:   euthymic  congruent and appropriate   Thought Process:  logical   Associations:  intact   Thought Content:  normal, no suicidality, no homicidality, delusions, or paranoia, focused on work   Insight:   Judgement:  fair  adequate to circumstances   Orientation:  grossly intact   Memory: intact for content of interview   Language: grossly intact   Attention Span & Concentration:  able to focus   Fund of Knowledge:  intact and appropriate to age and level of education     Assessment and Diagnosis   Status/Progress: Based on the examination today, the patient's problem(s) is/are well controlled.  New problems have been presented today.   Comorbidities are complicating management of the primary condition:Asperger's complicating her DORIS.  There are no active rule-out diagnoses for this patient at this time.    General Impression: 31 y.o. F with Asperger's and DORIS.  DORIS relatively well controlled currently.   Has some problems with anger at times, typically with parents.      Diagnosis:  Asperger's Disorder (299.80)  Generalized Anxiety Disorder (300.02).      Intervention/Counseling/Treatment Plan   · Continue lexapro 20 mg daily          Return to Clinic: 6 months

## 2022-04-05 ENCOUNTER — OFFICE VISIT (OUTPATIENT)
Dept: UROLOGY | Facility: CLINIC | Age: 32
End: 2022-04-05
Payer: COMMERCIAL

## 2022-04-05 VITALS
SYSTOLIC BLOOD PRESSURE: 113 MMHG | HEART RATE: 101 BPM | DIASTOLIC BLOOD PRESSURE: 77 MMHG | HEIGHT: 59 IN | BODY MASS INDEX: 33.34 KG/M2 | WEIGHT: 165.38 LBS

## 2022-04-05 DIAGNOSIS — N30.01 ACUTE CYSTITIS WITH HEMATURIA: Primary | ICD-10-CM

## 2022-04-05 PROCEDURE — 99999 PR PBB SHADOW E&M-EST. PATIENT-LVL III: ICD-10-PCS | Mod: PBBFAC,,, | Performed by: UROLOGY

## 2022-04-05 PROCEDURE — 99999 PR PBB SHADOW E&M-EST. PATIENT-LVL III: CPT | Mod: PBBFAC,,, | Performed by: UROLOGY

## 2022-04-05 PROCEDURE — 87086 URINE CULTURE/COLONY COUNT: CPT | Performed by: UROLOGY

## 2022-04-05 PROCEDURE — 99203 PR OFFICE/OUTPT VISIT, NEW, LEVL III, 30-44 MIN: ICD-10-PCS | Mod: S$GLB,,, | Performed by: UROLOGY

## 2022-04-05 PROCEDURE — 99203 OFFICE O/P NEW LOW 30 MIN: CPT | Mod: S$GLB,,, | Performed by: UROLOGY

## 2022-04-05 NOTE — PROGRESS NOTES
Subjective:       Patient ID: Tammy Bonner is a 31 y.o. female.    Chief Complaint: No chief complaint on file.    HPI  Patient had recent UTIs with salmonella.  No flank pain fever chills.  She did think that she saw blood it 1 time.  No history of stones and she feels well now.  Her urinalysis is totally clear today  Past Medical History:   Diagnosis Date    Abnormal cervical Papanicolaou smear 2011    Re-pap    ALLERGIC RHINITIS     Anxiety     Asperger's syndrome     learning disability    Depression     Fibroadenoma of right breast     Glaucoma suspect        Past Surgical History:   Procedure Laterality Date    BREAST BIOPSY  2011    Right - fiboradenoma    right submandibular siliathosis removal  05/2018    removal gland and stones     WISDOM TOOTH EXTRACTION         Family History   Problem Relation Age of Onset    Hypertension Mother     Diabetes Mother     Glaucoma Father     Ovarian cancer Other         mat GGM    Breast cancer Neg Hx     Colon cancer Neg Hx        Social History     Socioeconomic History    Marital status: Single   Tobacco Use    Smoking status: Never Smoker    Smokeless tobacco: Never Used   Substance and Sexual Activity    Alcohol use: No    Drug use: No    Sexual activity: Yes     Partners: Female     Birth control/protection: OCP     Social Determinants of Health     Financial Resource Strain: Low Risk     Difficulty of Paying Living Expenses: Not very hard   Food Insecurity: No Food Insecurity    Worried About Running Out of Food in the Last Year: Never true    Ran Out of Food in the Last Year: Never true   Transportation Needs: No Transportation Needs    Lack of Transportation (Medical): No    Lack of Transportation (Non-Medical): No   Physical Activity: Insufficiently Active    Days of Exercise per Week: 2 days    Minutes of Exercise per Session: 60 min   Stress: No Stress Concern Present    Feeling of Stress : Only a little   Social  Connections: Unknown    Frequency of Communication with Friends and Family: Twice a week    Frequency of Social Gatherings with Friends and Family: Once a week    Active Member of Clubs or Organizations: No    Attends Club or Organization Meetings: Never    Marital Status: Never    Housing Stability: Low Risk     Unable to Pay for Housing in the Last Year: No    Number of Places Lived in the Last Year: 0    Unstable Housing in the Last Year: No       Allergies:  Patient has no known allergies.    Medications:    Current Outpatient Medications:     EScitalopram oxalate (LEXAPRO) 20 MG tablet, Take 1 tablet (20 mg total) by mouth once daily., Disp: 90 tablet, Rfl: 1    FEMYNOR 0.25-35 mg-mcg per tablet, TAKE 1 TABLET BY MOUTH EVERY DAY, Disp: 84 tablet, Rfl: 4    multivitamin (THERAGRAN) per tablet, Take 1 tablet by mouth once daily., Disp: , Rfl:     triamcinolone acetonide 0.1% (KENALOG) 0.1 % ointment, AAA bid, Disp: 80 g, Rfl: 3    Review of Systems    Objective:      Physical Exam  Constitutional:       Appearance: She is well-developed.   HENT:      Head: Normocephalic.   Cardiovascular:      Rate and Rhythm: Normal rate.   Pulmonary:      Effort: Pulmonary effort is normal.   Abdominal:      Palpations: Abdomen is soft.   Musculoskeletal:         General: Normal range of motion.   Skin:     General: Skin is warm.   Neurological:      Mental Status: She is alert.         Assessment:       1. Acute cystitis with hematuria        Plan:       Diagnoses and all orders for this visit:    Acute cystitis with hematuria  -     Urine culture  -     US Retroperitoneal Complete; Future  -     Cystoscopy; Future

## 2022-04-06 LAB — BACTERIA UR CULT: NO GROWTH

## 2022-04-19 ENCOUNTER — HOSPITAL ENCOUNTER (OUTPATIENT)
Dept: RADIOLOGY | Facility: HOSPITAL | Age: 32
Discharge: HOME | End: 2022-04-19
Attending: UROLOGY
Payer: COMMERCIAL

## 2022-04-19 DIAGNOSIS — N30.01 ACUTE CYSTITIS WITH HEMATURIA: ICD-10-CM

## 2022-04-19 PROCEDURE — 76770 US RETROPERITONEAL COMPLETE: ICD-10-PCS | Mod: 26,,, | Performed by: RADIOLOGY

## 2022-04-19 PROCEDURE — 76770 US EXAM ABDO BACK WALL COMP: CPT | Mod: TC

## 2022-04-19 PROCEDURE — 76770 US EXAM ABDO BACK WALL COMP: CPT | Mod: 26,,, | Performed by: RADIOLOGY

## 2022-05-17 ENCOUNTER — PROCEDURE VISIT (OUTPATIENT)
Dept: UROLOGY | Facility: CLINIC | Age: 32
End: 2022-05-17
Payer: COMMERCIAL

## 2022-05-17 VITALS
TEMPERATURE: 98 F | WEIGHT: 172.5 LBS | DIASTOLIC BLOOD PRESSURE: 67 MMHG | SYSTOLIC BLOOD PRESSURE: 117 MMHG | HEART RATE: 78 BPM | RESPIRATION RATE: 16 BRPM | HEIGHT: 59 IN | BODY MASS INDEX: 34.77 KG/M2

## 2022-05-17 DIAGNOSIS — N30.01 ACUTE CYSTITIS WITH HEMATURIA: ICD-10-CM

## 2022-05-17 PROCEDURE — 52000 CYSTOSCOPY: ICD-10-PCS | Mod: S$GLB,,, | Performed by: UROLOGY

## 2022-05-17 PROCEDURE — 52000 CYSTOURETHROSCOPY: CPT | Mod: S$GLB,,, | Performed by: UROLOGY

## 2022-05-17 RX ORDER — LIDOCAINE HYDROCHLORIDE 20 MG/ML
JELLY TOPICAL ONCE
Status: COMPLETED | OUTPATIENT
Start: 2022-05-17 | End: 2022-05-17

## 2022-05-17 RX ADMIN — LIDOCAINE HYDROCHLORIDE: 20 JELLY TOPICAL at 02:05

## 2022-05-17 NOTE — PATIENT INSTRUCTIONS
What to Expect After a Cystoscopy  For the next 24-48 hours, you may feel a mild burning when you urinate. This burning is normal and expected. Drink plenty of water to dilute the urine to help relieve the burning sensation. You may also see a small amount of blood in your urine after the procedure.    Unless you are already taking antibiotics, you may be given an antibiotic after the test to prevent infection.    Signs and Symptoms to Report  Call the [facility] at [phone] if you develop any of the following:  Fever of 101 degrees or higher  Chills or persistent bleeding  Inability to urinate

## 2022-05-17 NOTE — PROCEDURES
Cystoscopy    Date/Time: 5/17/2022 1:45 PM  Performed by: Bran Medina Jr., MD  Authorized by: Bran Medina Jr., MD     Consent Done?:  Yes (Written)  Timeout: prior to procedure the correct patient, procedure, and site was verified    Prep: patient was prepped and draped in usual sterile fashion    Local anesthesia used?: No    Anesthesia:  Lidocaine jelly  Indications: recurrent UTIs    Position:  Dorsal lithotomy  Anesthesia:  Lidocaine jelly  Patient sedated?: No    Preparation: Patient was prepped and draped in usual sterile fashion    Scope type:  Flexible cystoscope  Stent inserted: No    Stent removed: No    External exam normal: No    Urethral Meatus Normal: Yes    Meatal stenosis: No    Hypospadius: No    Condyloma: No    Urethra normal: Yes    Bladder neck normal: Yes    Bladder normal: Yes    Comments:      Usual prec  RTC prn uti  US neg

## 2022-05-30 ENCOUNTER — PATIENT MESSAGE (OUTPATIENT)
Dept: ADMINISTRATIVE | Facility: HOSPITAL | Age: 32
End: 2022-05-30
Payer: COMMERCIAL

## 2022-07-11 ENCOUNTER — PATIENT MESSAGE (OUTPATIENT)
Dept: ADMINISTRATIVE | Facility: HOSPITAL | Age: 32
End: 2022-07-11
Payer: COMMERCIAL

## 2022-07-20 ENCOUNTER — PATIENT MESSAGE (OUTPATIENT)
Dept: INTERNAL MEDICINE | Facility: CLINIC | Age: 32
End: 2022-07-20
Payer: COMMERCIAL

## 2022-07-21 NOTE — TELEPHONE ENCOUNTER
Called and spoke to pt. Pt started with symptoms Monday, tested positive Wednesday. Pt c/o fever, up to 99.4, nasal congestion, sore throat, nonproduxctive cough. Pt denies CP and SOB. Reviewed isolation protocol with patient as well as symptomatic treatment.     covid risk 1

## 2022-10-04 ENCOUNTER — PATIENT MESSAGE (OUTPATIENT)
Dept: INTERNAL MEDICINE | Facility: CLINIC | Age: 32
End: 2022-10-04
Payer: COMMERCIAL

## 2022-10-04 DIAGNOSIS — Z00.00 ANNUAL PHYSICAL EXAM: Primary | ICD-10-CM

## 2022-10-04 NOTE — TELEPHONE ENCOUNTER
Annual lab orders in including cholesterol  Have her abiola fasting lab prior to appt as requested

## 2022-10-05 ENCOUNTER — TELEPHONE (OUTPATIENT)
Dept: OBSTETRICS AND GYNECOLOGY | Facility: CLINIC | Age: 32
End: 2022-10-05
Payer: COMMERCIAL

## 2022-10-27 ENCOUNTER — LAB VISIT (OUTPATIENT)
Dept: LAB | Facility: HOSPITAL | Age: 32
End: 2022-10-27
Attending: INTERNAL MEDICINE
Payer: COMMERCIAL

## 2022-10-27 DIAGNOSIS — Z00.00 ANNUAL PHYSICAL EXAM: ICD-10-CM

## 2022-10-27 LAB
ALBUMIN SERPL BCP-MCNC: 3.4 G/DL (ref 3.5–5.2)
ALP SERPL-CCNC: 70 U/L (ref 55–135)
ALT SERPL W/O P-5'-P-CCNC: 18 U/L (ref 10–44)
ANION GAP SERPL CALC-SCNC: 11 MMOL/L (ref 8–16)
AST SERPL-CCNC: 20 U/L (ref 10–40)
BASOPHILS # BLD AUTO: 0.03 K/UL (ref 0–0.2)
BASOPHILS NFR BLD: 0.3 % (ref 0–1.9)
BILIRUB SERPL-MCNC: 0.5 MG/DL (ref 0.1–1)
BUN SERPL-MCNC: 14 MG/DL (ref 6–20)
CALCIUM SERPL-MCNC: 9.2 MG/DL (ref 8.7–10.5)
CHLORIDE SERPL-SCNC: 105 MMOL/L (ref 95–110)
CHOLEST SERPL-MCNC: 202 MG/DL (ref 120–199)
CHOLEST/HDLC SERPL: 2.9 {RATIO} (ref 2–5)
CO2 SERPL-SCNC: 23 MMOL/L (ref 23–29)
CREAT SERPL-MCNC: 0.8 MG/DL (ref 0.5–1.4)
DIFFERENTIAL METHOD: ABNORMAL
EOSINOPHIL # BLD AUTO: 0.2 K/UL (ref 0–0.5)
EOSINOPHIL NFR BLD: 1.7 % (ref 0–8)
ERYTHROCYTE [DISTWIDTH] IN BLOOD BY AUTOMATED COUNT: 13.2 % (ref 11.5–14.5)
EST. GFR  (NO RACE VARIABLE): >60 ML/MIN/1.73 M^2
ESTIMATED AVG GLUCOSE: 97 MG/DL (ref 68–131)
GLUCOSE SERPL-MCNC: 94 MG/DL (ref 70–110)
HBA1C MFR BLD: 5 % (ref 4–5.6)
HCT VFR BLD AUTO: 39.8 % (ref 37–48.5)
HDLC SERPL-MCNC: 70 MG/DL (ref 40–75)
HDLC SERPL: 34.7 % (ref 20–50)
HGB BLD-MCNC: 12.8 G/DL (ref 12–16)
IMM GRANULOCYTES # BLD AUTO: 0.05 K/UL (ref 0–0.04)
IMM GRANULOCYTES NFR BLD AUTO: 0.6 % (ref 0–0.5)
LDLC SERPL CALC-MCNC: 104.8 MG/DL (ref 63–159)
LYMPHOCYTES # BLD AUTO: 2 K/UL (ref 1–4.8)
LYMPHOCYTES NFR BLD: 22.8 % (ref 18–48)
MCH RBC QN AUTO: 29.9 PG (ref 27–31)
MCHC RBC AUTO-ENTMCNC: 32.2 G/DL (ref 32–36)
MCV RBC AUTO: 93 FL (ref 82–98)
MONOCYTES # BLD AUTO: 0.8 K/UL (ref 0.3–1)
MONOCYTES NFR BLD: 8.6 % (ref 4–15)
NEUTROPHILS # BLD AUTO: 5.9 K/UL (ref 1.8–7.7)
NEUTROPHILS NFR BLD: 66 % (ref 38–73)
NONHDLC SERPL-MCNC: 132 MG/DL
NRBC BLD-RTO: 0 /100 WBC
PLATELET # BLD AUTO: 347 K/UL (ref 150–450)
PMV BLD AUTO: 9.5 FL (ref 9.2–12.9)
POTASSIUM SERPL-SCNC: 4.2 MMOL/L (ref 3.5–5.1)
PROT SERPL-MCNC: 7.2 G/DL (ref 6–8.4)
RBC # BLD AUTO: 4.28 M/UL (ref 4–5.4)
SODIUM SERPL-SCNC: 139 MMOL/L (ref 136–145)
TRIGL SERPL-MCNC: 136 MG/DL (ref 30–150)
TSH SERPL DL<=0.005 MIU/L-ACNC: 1.58 UIU/ML (ref 0.4–4)
WBC # BLD AUTO: 8.94 K/UL (ref 3.9–12.7)

## 2022-10-27 PROCEDURE — 84443 ASSAY THYROID STIM HORMONE: CPT | Performed by: INTERNAL MEDICINE

## 2022-10-27 PROCEDURE — 85025 COMPLETE CBC W/AUTO DIFF WBC: CPT | Performed by: INTERNAL MEDICINE

## 2022-10-27 PROCEDURE — 80053 COMPREHEN METABOLIC PANEL: CPT | Performed by: INTERNAL MEDICINE

## 2022-10-27 PROCEDURE — 80061 LIPID PANEL: CPT | Performed by: INTERNAL MEDICINE

## 2022-10-27 PROCEDURE — 36415 COLL VENOUS BLD VENIPUNCTURE: CPT | Performed by: INTERNAL MEDICINE

## 2022-10-27 PROCEDURE — 83036 HEMOGLOBIN GLYCOSYLATED A1C: CPT | Performed by: INTERNAL MEDICINE

## 2022-11-01 ENCOUNTER — IMMUNIZATION (OUTPATIENT)
Dept: INTERNAL MEDICINE | Facility: CLINIC | Age: 32
End: 2022-11-01
Payer: COMMERCIAL

## 2022-11-01 ENCOUNTER — OFFICE VISIT (OUTPATIENT)
Dept: INTERNAL MEDICINE | Facility: CLINIC | Age: 32
End: 2022-11-01
Payer: COMMERCIAL

## 2022-11-01 VITALS
WEIGHT: 177.5 LBS | BODY MASS INDEX: 35.78 KG/M2 | OXYGEN SATURATION: 98 % | DIASTOLIC BLOOD PRESSURE: 76 MMHG | HEART RATE: 88 BPM | SYSTOLIC BLOOD PRESSURE: 112 MMHG | HEIGHT: 59 IN

## 2022-11-01 DIAGNOSIS — Z23 ENCOUNTER FOR ADMINISTRATION OF VACCINE: Primary | ICD-10-CM

## 2022-11-01 DIAGNOSIS — F41.9 ANXIETY: ICD-10-CM

## 2022-11-01 DIAGNOSIS — Z00.00 ANNUAL PHYSICAL EXAM: Primary | ICD-10-CM

## 2022-11-01 PROCEDURE — 90471 FLU VACCINE (QUAD) GREATER THAN OR EQUAL TO 3YO PRESERVATIVE FREE IM: ICD-10-PCS | Mod: S$GLB,,, | Performed by: INTERNAL MEDICINE

## 2022-11-01 PROCEDURE — 99395 PREV VISIT EST AGE 18-39: CPT | Mod: 25,S$GLB,, | Performed by: INTERNAL MEDICINE

## 2022-11-01 PROCEDURE — 90686 FLU VACCINE (QUAD) GREATER THAN OR EQUAL TO 3YO PRESERVATIVE FREE IM: ICD-10-PCS | Mod: S$GLB,,, | Performed by: INTERNAL MEDICINE

## 2022-11-01 PROCEDURE — 99999 PR PBB SHADOW E&M-EST. PATIENT-LVL IV: CPT | Mod: PBBFAC,,, | Performed by: INTERNAL MEDICINE

## 2022-11-01 PROCEDURE — 99395 PR PREVENTIVE VISIT,EST,18-39: ICD-10-PCS | Mod: 25,S$GLB,, | Performed by: INTERNAL MEDICINE

## 2022-11-01 PROCEDURE — 90471 IMMUNIZATION ADMIN: CPT | Mod: S$GLB,,, | Performed by: INTERNAL MEDICINE

## 2022-11-01 PROCEDURE — 90686 IIV4 VACC NO PRSV 0.5 ML IM: CPT | Mod: S$GLB,,, | Performed by: INTERNAL MEDICINE

## 2022-11-01 PROCEDURE — 99999 PR PBB SHADOW E&M-EST. PATIENT-LVL IV: ICD-10-PCS | Mod: PBBFAC,,, | Performed by: INTERNAL MEDICINE

## 2022-11-01 NOTE — PROGRESS NOTES
Answers submitted by the patient for this visit:  Review of Systems Questionnaire (Submitted on 11/1/2022)  activity change: No  unexpected weight change: No  neck pain: No  hearing loss: No  rhinorrhea: No  trouble swallowing: No  eye discharge: No  visual disturbance: No  chest tightness: No  wheezing: No  chest pain: No  palpitations: No  blood in stool: No  constipation: No  vomiting: No  diarrhea: No  polydipsia: No  polyuria: No  difficulty urinating: No  hematuria: No  menstrual problem: No  dysuria: No  joint swelling: No  arthralgias: No  headaches: No  weakness: No  confusion: No  dysphoric mood: No  Subjective:       Patient ID: Tammy Bonner is a 32 y.o. female.    Chief Complaint: Annual Exam  This is a 32-year-old who presents today for physical.  Patient reports in general doing well she continues to work virtually number she enjoys although she has been more sedentary and her weight is trending up over time she just started back into an exercise program of walking and hopes to build her exercise tolerance back up.  Patient reports she is in the process of establishing with a new gynecologist since hers is not practicing at this time.  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   HENT:  Negative for hearing loss, rhinorrhea and trouble swallowing.    Eyes:  Negative for discharge and visual disturbance.   Respiratory:  Negative for chest tightness and wheezing.    Cardiovascular:  Negative for chest pain and palpitations.   Gastrointestinal:  Negative for blood in stool, constipation, diarrhea and vomiting.   Endocrine: Negative for polydipsia and polyuria.   Genitourinary:  Negative for difficulty urinating,  "dysuria, hematuria and menstrual problem.   Musculoskeletal:  Negative for arthralgias, joint swelling and neck pain.   Neurological:  Negative for weakness and headaches.   Psychiatric/Behavioral:  Negative for confusion and dysphoric mood.      Objective:    Blood pressure 112/76, pulse 88, height 4' 11" (1.499 m), weight 80.5 kg (177 lb 7.5 oz), SpO2 98 %.   Physical Exam  Constitutional:       General: She is not in acute distress.  HENT:      Head: Normocephalic.      Mouth/Throat:      Pharynx: Oropharynx is clear.   Eyes:      General: No scleral icterus.  Cardiovascular:      Rate and Rhythm: Normal rate and regular rhythm.      Heart sounds: Normal heart sounds. No murmur heard.    No friction rub. No gallop.      Comments: Surgical scar right breast   Pulmonary:      Effort: Pulmonary effort is normal. No respiratory distress.      Breath sounds: Normal breath sounds.   Abdominal:      General: Bowel sounds are normal.      Palpations: Abdomen is soft. There is no mass.      Tenderness: There is no abdominal tenderness.   Musculoskeletal:      Cervical back: Neck supple.   Skin:     Findings: No erythema.   Neurological:      Mental Status: She is alert.   Psychiatric:         Mood and Affect: Mood normal.       Assessment:       1. Annual physical exam    2. Anxiety          Plan:       Tammy was seen today for annual exam.    Diagnoses and all orders for this visit:    Annual physical exam    Anxiety  History of she remains on Lexapro follows with her psychiatrist and does counseling through work she will continue we discussed exercise and walking program to help prevent further weight gain and also for her moods    History of fibroadenoma lows with prior surgeries she has had no recent changes and had an appoint with the breast clinic    Labs reviewed flu shot recommended  COVID booster discussed    She will follow-up with gyn as planned              "

## 2022-11-01 NOTE — PROGRESS NOTES
After obtaining consent, and per orders of Dr. Radha Macias, injection of Fluarix given in left deltoid by Joan Lucero. Patient instructed to remain in clinic for 15 minutes afterwards, and to report any adverse reaction to staff immediately. Pt tolerated vaccine well.

## 2022-11-10 ENCOUNTER — TELEPHONE (OUTPATIENT)
Dept: INTERNAL MEDICINE | Facility: CLINIC | Age: 32
End: 2022-11-10
Payer: COMMERCIAL

## 2022-11-11 ENCOUNTER — TELEPHONE (OUTPATIENT)
Dept: INTERNAL MEDICINE | Facility: CLINIC | Age: 32
End: 2022-11-11
Payer: COMMERCIAL

## 2022-11-14 ENCOUNTER — TELEPHONE (OUTPATIENT)
Dept: INTERNAL MEDICINE | Facility: CLINIC | Age: 32
End: 2022-11-14
Payer: COMMERCIAL

## 2022-11-15 ENCOUNTER — TELEPHONE (OUTPATIENT)
Dept: INTERNAL MEDICINE | Facility: CLINIC | Age: 32
End: 2022-11-15
Payer: COMMERCIAL

## 2022-11-15 DIAGNOSIS — R63.5 WEIGHT GAIN: Primary | ICD-10-CM

## 2022-11-15 DIAGNOSIS — E66.9 CLASS 2 OBESITY WITH BODY MASS INDEX (BMI) OF 35.0 TO 35.9 IN ADULT, UNSPECIFIED OBESITY TYPE, UNSPECIFIED WHETHER SERIOUS COMORBIDITY PRESENT: ICD-10-CM

## 2022-11-15 NOTE — TELEPHONE ENCOUNTER
Called and reviewed with pt  Referal to dietician  And referral to bariatric medicine   As discussed give pt numbers for [facility]

## 2022-11-15 NOTE — TELEPHONE ENCOUNTER
----- Message from Justina Vera sent at 11/15/2022  2:42 PM CST -----  Contact: [phone]  Patient is returning a phone call.  Who left a message for the patient: Radha Macias MD     Does patient know what this is regarding:  yes   Would you like a call back, or a response through your [facility] portal?:   call back  Comments:

## 2022-11-29 ENCOUNTER — IMMUNIZATION (OUTPATIENT)
Dept: PHARMACY | Facility: CLINIC | Age: 32
End: 2022-11-29
Payer: COMMERCIAL

## 2022-11-29 DIAGNOSIS — Z23 NEED FOR VACCINATION: Primary | ICD-10-CM

## 2023-02-07 ENCOUNTER — OFFICE VISIT (OUTPATIENT)
Dept: PSYCHIATRY | Facility: CLINIC | Age: 33
End: 2023-02-07
Payer: COMMERCIAL

## 2023-02-07 DIAGNOSIS — F41.1 GAD (GENERALIZED ANXIETY DISORDER): ICD-10-CM

## 2023-02-07 DIAGNOSIS — F84.5 ASPERGER SYNDROME: Primary | ICD-10-CM

## 2023-02-07 PROCEDURE — 99214 OFFICE O/P EST MOD 30 MIN: CPT | Mod: 95,,, | Performed by: PSYCHIATRY & NEUROLOGY

## 2023-02-07 PROCEDURE — 99214 PR OFFICE/OUTPT VISIT, EST, LEVL IV, 30-39 MIN: ICD-10-PCS | Mod: 95,,, | Performed by: PSYCHIATRY & NEUROLOGY

## 2023-02-07 RX ORDER — ESCITALOPRAM OXALATE 20 MG/1
20 TABLET ORAL DAILY
Qty: 90 TABLET | Refills: 0 | Status: SHIPPED | OUTPATIENT
Start: 2023-02-07 | End: 2023-04-05 | Stop reason: SDUPTHER

## 2023-02-07 RX ORDER — BUPROPION HYDROCHLORIDE 150 MG/1
150 TABLET ORAL DAILY
Qty: 90 TABLET | Refills: 0 | Status: SHIPPED | OUTPATIENT
Start: 2023-02-07 | End: 2023-04-05

## 2023-02-07 NOTE — PROGRESS NOTES
"The patient location is:[facility] [address]  The chief complaint leading to consultation is: anxiety    Visit type: audiovisual    Face to Face time with patient: 26 minutes    32 minutes of total time spent on the encounter, which includes face to face time and non-face to face time preparing to see the patient (eg, review of tests), Obtaining and/or reviewing separately obtained history, Documenting clinical information in the electronic or other health record, Independently interpreting results (not separately reported) and communicating results to the patient/family/caregiver, or Care coordination (not separately reported).     Each patient to whom he or she provides medical services by telemedicine is:  (1) informed of the relationship between the physician and patient and the respective role of any other health care provider with respect to management of the patient; and (2) notified that he or she may decline to receive medical services by telemedicine and may withdraw from such care at any time.    Notes:       Outpatient Psychiatry Follow-Up Visit (MD/NP)    2/7/2023    Clinical Status of Patient:  Outpatient (Ambulatory)    Chief Complaint:  Tammy Bonner is a 32 y.o. female who presents today for follow-up of anxiety primarily, hx of low mood, stuttering, asperger's associated issues        Met with patient.      Interval History and Content of Current Session:  Interim Events/Subjective Report/Content of Current Session:   "I've been okay, been hanging in there."  Work has been hectic.  She may be depressed.  Her motivation has declined to do things like exercise, mostly wants to sit and do nothing.  Less interested in organizing.  Feels down on herself does not deserve to live and better off dead.  Has had peopries of wishing she would drop dead.  No thoughts of harming herself.  Relates to her menstrual cycle.   Had episodes of anxiety with difficulty breathing.  Difficulty dealing with mean " customers.  Gradually over the last two years perhaps since the pandemic.  Still enjoys going to [employer].  Has days when she over analyzes things.      Describes few situations of inappropriate anger.  Has had to apologize to mom and finacee.     Seeing a virtual online therapist through work, Roxane Berger through [employer].          Psychotherapy:  Target symptoms: mood swings, impulsivity , SI  Why chosen therapy is appropriate versus another modality: relevant to diagnosis, patient responds to this modality  Outcome monitoring methods: self-report, observation  Therapeutic intervention type: supportive psychotherapy  Topics discussed/themes: work stress, building skills sets for symptom management, symptom recognition.    The patient's response to the intervention is accepting. The patient's progress toward treatment goals is fair.   Duration of intervention:  mins    Review of Systems   Constitutional:  Negative for chills, fever and weight loss.   Respiratory:  Negative for cough, shortness of breath and wheezing.    Cardiovascular:  Negative for chest pain and palpitations.   Gastrointestinal:  Negative for abdominal pain, diarrhea and vomiting.   Genitourinary:  Negative for dysuria.         Past Medical, Family and Social History: The patient's past medical, family and social history have been reviewed and updated as appropriate within the electronic medical record - see encounter notes.    Compliance: yes    Side effects: None    Risk Parameters:  Patient reports no suicidal ideation  Patient reports no homicidal ideation  Patient reports no self-injurious behavior  Patient reports no violent behavior    Exam (detailed: at least 9 elements; comprehensive: all 15 elements)   Constitutional  Vitals: 1 Most recent vital signs, dated less than 90 days prior to this appointment, were reviewed.    There were no vitals filed for this visit.     General:  age appropriate, casually dressed, neatly groomed      Musculoskeletal  Muscle Strength/Tone:  no dyskinesia, no tic   Gait & Station:  Ambulates without difficulty during session     Psychiatric  Speech:  clearly audible, not pressured , quite verbal   Mood:   Affect:  euthymic  congruent and appropriate   Thought Process:  logical   Associations:  intact   Thought Content:  normal, no suicidality, no homicidality, delusions, or paranoia, focused on work   Insight:   Judgement:  fair  adequate to circumstances   Orientation:  grossly intact   Memory: intact for content of interview   Language: grossly intact   Attention Span & Concentration:  able to focus   Fund of Knowledge:  intact and appropriate to age and level of education     Assessment and Diagnosis   Status/Progress: Based on the examination today, the patient's problem(s) is/are well controlled.  New problems have been presented today.   Comorbidities are complicating management of the primary condition:Asperger's complicating her DORIS.  There are no active rule-out diagnoses for this patient at this time.    General Impression: 32 y.o. F with Asperger's and DORIS.  DORIS relatively well controlled currently.   Has some problems with anger at times, typically with parents.      Diagnosis:  Asperger's Disorder (299.80)  Generalized Anxiety Disorder (300.02).      Intervention/Counseling/Treatment Plan   Augmentation with Wellbutrin  mg daily.  Described risks and benefits in detail.  Continue lexapro 20 mg daily  Continue psychotherapy with Roxane Berger          Return to Clinic:  1 month/next available

## 2023-03-02 ENCOUNTER — OFFICE VISIT (OUTPATIENT)
Dept: OBSTETRICS AND GYNECOLOGY | Facility: CLINIC | Age: 33
End: 2023-03-02
Payer: COMMERCIAL

## 2023-03-02 ENCOUNTER — PATIENT MESSAGE (OUTPATIENT)
Dept: PSYCHIATRY | Facility: CLINIC | Age: 33
End: 2023-03-02
Payer: COMMERCIAL

## 2023-03-02 VITALS
SYSTOLIC BLOOD PRESSURE: 124 MMHG | HEIGHT: 59 IN | DIASTOLIC BLOOD PRESSURE: 66 MMHG | WEIGHT: 174 LBS | BODY MASS INDEX: 35.08 KG/M2

## 2023-03-02 DIAGNOSIS — Z01.419 WELL WOMAN EXAM WITH ROUTINE GYNECOLOGICAL EXAM: Primary | ICD-10-CM

## 2023-03-02 DIAGNOSIS — Z30.41 ENCOUNTER FOR SURVEILLANCE OF CONTRACEPTIVE PILLS: ICD-10-CM

## 2023-03-02 PROCEDURE — 99385 PR PREVENTIVE VISIT,NEW,18-39: ICD-10-PCS | Mod: S$GLB,,, | Performed by: OBSTETRICS & GYNECOLOGY

## 2023-03-02 PROCEDURE — 99999 PR PBB SHADOW E&M-EST. PATIENT-LVL III: ICD-10-PCS | Mod: PBBFAC,,, | Performed by: OBSTETRICS & GYNECOLOGY

## 2023-03-02 PROCEDURE — 87624 HPV HI-RISK TYP POOLED RSLT: CPT | Performed by: OBSTETRICS & GYNECOLOGY

## 2023-03-02 PROCEDURE — 99385 PREV VISIT NEW AGE 18-39: CPT | Mod: S$GLB,,, | Performed by: OBSTETRICS & GYNECOLOGY

## 2023-03-02 PROCEDURE — 88175 CYTOPATH C/V AUTO FLUID REDO: CPT | Performed by: OBSTETRICS & GYNECOLOGY

## 2023-03-02 PROCEDURE — 99999 PR PBB SHADOW E&M-EST. PATIENT-LVL III: CPT | Mod: PBBFAC,,, | Performed by: OBSTETRICS & GYNECOLOGY

## 2023-03-02 RX ORDER — NORGESTIMATE AND ETHINYL ESTRADIOL 0.25-0.035
1 KIT ORAL DAILY
Qty: 84 TABLET | Refills: 4 | Status: SHIPPED | OUTPATIENT
Start: 2023-03-02

## 2023-03-02 NOTE — PROGRESS NOTES
History & Physical  Gynecology      SUBJECTIVE:     Chief Complaint: Well Woman       History of Present Illness:  Annual Exam-Premenopausal  Patient presents for annual exam. She has no complaints today. Menstrual cycles are monthly.  She is sexually active. GYN screening history: last pap: approximate date  and was normal. She participates in regular exercise: yes.  Smoking status:  no    Contraception: OCP    FH:  Breast cancer: neg  Colon cancer: neg  Ovarian cancer: mat ggm    Review of patient's allergies indicates:  No Known Allergies    Past Medical History:   Diagnosis Date    Abnormal cervical Papanicolaou smear     Re-pap    ALLERGIC RHINITIS     Anxiety     Asperger's syndrome     learning disability    Depression     Fibroadenoma of right breast     Glaucoma suspect      Past Surgical History:   Procedure Laterality Date    BREAST BIOPSY      Right - fiboradenoma    right submandibular siliathosis removal  2018    removal gland and stones     WISDOM TOOTH EXTRACTION       OB History          0    Para        Term                AB        Living             SAB        IAB        Ectopic        Multiple        Live Births                   Family History   Problem Relation Age of Onset    Hypertension Mother     Diabetes Mother     Glaucoma Father     Ovarian cancer Other         mat GGM    Breast cancer Neg Hx     Colon cancer Neg Hx      Social History     Tobacco Use    Smoking status: Never    Smokeless tobacco: Never   Substance Use Topics    Alcohol use: No    Drug use: No       Current Outpatient Medications   Medication Sig    buPROPion (WELLBUTRIN XL) 150 MG TB24 tablet Take 1 tablet (150 mg total) by mouth once daily.    EScitalopram oxalate (LEXAPRO) 20 MG tablet Take 1 tablet (20 mg total) by mouth once daily.    multivitamin (THERAGRAN) per tablet Take 1 tablet by mouth once daily.    triamcinolone acetonide 0.1% (KENALOG) 0.1 % ointment AAA bid     norgestimate-ethinyl estradioL (ESTARYLLA) 0.25-35 mg-mcg per tablet Take 1 tablet by mouth once daily.     No current facility-administered medications for this visit.         Review of Systems:  Review of Systems   Constitutional:  Negative for appetite change, fever and unexpected weight change.   Respiratory:  Negative for shortness of breath.    Cardiovascular:  Negative for chest pain.   Gastrointestinal:  Negative for nausea and vomiting.   Genitourinary:  Negative for menorrhagia, menstrual problem, pelvic pain, vaginal bleeding, vaginal discharge and vaginal pain.   Integumentary:  Negative for breast mass.   Breast: Negative for lump and mass     OBJECTIVE:     Physical Exam:  Physical Exam  Vitals and nursing note reviewed.   Constitutional:       Appearance: She is well-developed.   Cardiovascular:      Rate and Rhythm: Normal rate and regular rhythm.      Heart sounds: Normal heart sounds.   Pulmonary:      Effort: Pulmonary effort is normal.      Breath sounds: Normal breath sounds.   Chest:   Breasts:     Breasts are symmetrical.      Right: No inverted nipple, mass, nipple discharge, skin change or tenderness.      Left: No inverted nipple, mass, nipple discharge, skin change or tenderness.   Abdominal:      Palpations: Abdomen is soft.   Genitourinary:     General: Normal vulva.      Labia:         Right: No rash, tenderness, lesion or injury.         Left: No rash, tenderness, lesion or injury.       Urethra: No prolapse, urethral pain, urethral swelling or urethral lesion.      Vagina: Normal. No signs of injury and foreign body. No vaginal discharge, erythema, tenderness or bleeding.      Cervix: No cervical motion tenderness, discharge or friability.      Uterus: Not deviated, not enlarged, not fixed and not tender.       Adnexa:         Right: No mass, tenderness or fullness.          Left: No mass, tenderness or fullness.        Rectum: No anal fissure or external hemorrhoid.      Comments:  Urethral meatus: normal size, anterior vaginal wall with no prolapse, no lesions  Bladder: no fullness, masses or tenderness  Musculoskeletal:      Cervical back: Normal range of motion.   Neurological:      Mental Status: She is alert and oriented to person, place, and time.   Psychiatric:         Behavior: Behavior normal.         Thought Content: Thought content normal.         Judgment: Judgment normal.       Chaperoned by: Noel    ASSESSMENT:       ICD-10-CM ICD-9-CM    1. Well woman exam with routine gynecological exam  Z01.419 V72.31 Liquid-Based Pap Smear, Screening      HPV High Risk Genotypes, PCR      2. Encounter for surveillance of contraceptive pills  Z30.41 V25.41 norgestimate-ethinyl estradioL (ESTARYLLA) 0.25-35 mg-mcg per tablet             Plan:      Tammy was seen today for well woman.    Diagnoses and all orders for this visit:    Well woman exam with routine gynecological exam  -     Liquid-Based Pap Smear, Screening  -     HPV High Risk Genotypes, PCR    Encounter for surveillance of contraceptive pills  -     norgestimate-ethinyl estradioL (ESTARYLLA) 0.25-35 mg-mcg per tablet; Take 1 tablet by mouth once daily.        Orders Placed This Encounter   Procedures    HPV High Risk Genotypes, PCR       Well Woman:  - Pap smear and hpv today  - Birth control: refilled  - GC/CT:n/a  - Mammogram: due age 41  - Smoking cessation: n/a  - Labs: none required   - Vaccines: hpv completed  - Exercise counseling        Follow up in  one year for annual or prn.    Leticia Tanner

## 2023-03-07 LAB
FINAL PATHOLOGIC DIAGNOSIS: NORMAL
Lab: NORMAL

## 2023-03-08 LAB
HPV HR 12 DNA SPEC QL NAA+PROBE: NEGATIVE
HPV16 AG SPEC QL: NEGATIVE
HPV18 DNA SPEC QL NAA+PROBE: NEGATIVE

## 2023-03-15 ENCOUNTER — PATIENT MESSAGE (OUTPATIENT)
Dept: INTERNAL MEDICINE | Facility: CLINIC | Age: 33
End: 2023-03-15
Payer: COMMERCIAL

## 2023-04-05 ENCOUNTER — OFFICE VISIT (OUTPATIENT)
Dept: PSYCHIATRY | Facility: CLINIC | Age: 33
End: 2023-04-05
Payer: COMMERCIAL

## 2023-04-05 VITALS
HEART RATE: 99 BPM | SYSTOLIC BLOOD PRESSURE: 113 MMHG | DIASTOLIC BLOOD PRESSURE: 68 MMHG | WEIGHT: 174.25 LBS | BODY MASS INDEX: 35.2 KG/M2

## 2023-04-05 DIAGNOSIS — F84.5 ASPERGER SYNDROME: Primary | ICD-10-CM

## 2023-04-05 DIAGNOSIS — F41.1 GAD (GENERALIZED ANXIETY DISORDER): ICD-10-CM

## 2023-04-05 DIAGNOSIS — F32.A DEPRESSION, UNSPECIFIED DEPRESSION TYPE: ICD-10-CM

## 2023-04-05 PROCEDURE — 99999 PR PBB SHADOW E&M-EST. PATIENT-LVL II: ICD-10-PCS | Mod: PBBFAC,,, | Performed by: PSYCHIATRY & NEUROLOGY

## 2023-04-05 PROCEDURE — 99999 PR PBB SHADOW E&M-EST. PATIENT-LVL II: CPT | Mod: PBBFAC,,, | Performed by: PSYCHIATRY & NEUROLOGY

## 2023-04-05 PROCEDURE — 99213 OFFICE O/P EST LOW 20 MIN: CPT | Mod: S$GLB,,, | Performed by: PSYCHIATRY & NEUROLOGY

## 2023-04-05 PROCEDURE — 99213 PR OFFICE/OUTPT VISIT, EST, LEVL III, 20-29 MIN: ICD-10-PCS | Mod: S$GLB,,, | Performed by: PSYCHIATRY & NEUROLOGY

## 2023-04-05 RX ORDER — BUPROPION HYDROCHLORIDE 300 MG/1
300 TABLET ORAL DAILY
Qty: 90 TABLET | Refills: 0 | Status: SHIPPED | OUTPATIENT
Start: 2023-04-05 | End: 2023-05-19 | Stop reason: SDUPTHER

## 2023-04-05 RX ORDER — ESCITALOPRAM OXALATE 20 MG/1
20 TABLET ORAL DAILY
Qty: 90 TABLET | Refills: 0 | Status: SHIPPED | OUTPATIENT
Start: 2023-04-05 | End: 2023-05-19 | Stop reason: SDUPTHER

## 2023-04-05 NOTE — PROGRESS NOTES
Outpatient Psychiatry Follow-Up Visit (MD/NP)    4/5/2023    Clinical Status of Patient:  Outpatient (Ambulatory)    Chief Complaint:  Tammy Bonner is a 32 y.o. female who presents today for follow-up of anxiety primarily, hx of low mood, stuttering, asperger's associated issues        Met with patient.      Interval History and Content of Current Session:  Interim Events/Subjective Report/Content of Current Session:   she feels the med has helped a lot.  No anxiety attacks.  Work stress is better managable.   Better able to handle difficult customers.  Still having trouble with motivation.  Struggles to work out, do chores around the house. Causing her to have some anxiety with being undecided with what to do.  Has been less down on herself.  Has not had episodes of wishing she were dead.  Enjoys the gym once she is there, and making protein shakes.      Anger has improved.      Meets with Ms Berger once a month and check in through chat weekly.        Psychotherapy:  Target symptoms: mood swings, impulsivity , SI  Why chosen therapy is appropriate versus another modality: relevant to diagnosis, patient responds to this modality  Outcome monitoring methods: self-report, observation  Therapeutic intervention type: supportive psychotherapy  Topics discussed/themes: work stress, building skills sets for symptom management, symptom recognition.    The patient's response to the intervention is accepting. The patient's progress toward treatment goals is fair.   Duration of intervention:  mins    Review of Systems   Constitutional:  Negative for chills, fever and weight loss.   Respiratory:  Negative for cough, shortness of breath and wheezing.    Cardiovascular:  Negative for chest pain and palpitations.       Past Medical, Family and Social History: The patient's past medical, family and social history have been reviewed and updated as appropriate within the electronic medical record - see encounter  notes.    Compliance: yes    Side effects: None    Risk Parameters:  Patient reports no suicidal ideation  Patient reports no homicidal ideation  Patient reports no self-injurious behavior  Patient reports no violent behavior    Exam (detailed: at least 9 elements; comprehensive: all 15 elements)   Constitutional  Vitals: 1 Most recent vital signs, dated less than 90 days prior to this appointment, were reviewed.    Vitals:    04/05/23 0836   BP: 113/68   Pulse: 99   Weight: 79.1 kg (174 lb 4.4 oz)        General:  age appropriate, casually dressed, neatly groomed     Musculoskeletal  Muscle Strength/Tone:  no dyskinesia, no tic   Gait & Station:  Ambulates without difficulty during session     Psychiatric  Speech:  clearly audible, not pressured , quite verbal   Mood:   Affect:  euthymic  congruent and appropriate   Thought Process:  logical   Associations:  intact   Thought Content:  normal, no suicidality, no homicidality, delusions, or paranoia, focused on work   Insight:   Judgement:  fair  adequate to circumstances   Orientation:  grossly intact   Memory: intact for content of interview   Language: grossly intact   Attention Span & Concentration:  able to focus   Fund of Knowledge:  intact and appropriate to age and level of education     Assessment and Diagnosis   Status/Progress: Based on the examination today, the patient's problem(s) is/are well controlled.  New problems have been presented today.   Comorbidities are complicating management of the primary condition:Asperger's complicating her DORIS.  There are no active rule-out diagnoses for this patient at this time.    General Impression: 32 y.o. F with Asperger's, depression, and DORIS.  DORIS relatively well controlled currently.   Has some problems with anger at times, typically with parents.  Depression is a more recent problem.    Diagnosis:  Asperger's Disorder (299.80)  Generalized Anxiety Disorder (300.02).  Depression  unspec      Intervention/Counseling/Treatment Plan   Augmentation with Wellbutrin  mg daily.  Described risks and benefits in detail.  Continue lexapro 20 mg daily  Continue psychotherapy with Roxane Berger          Return to Clinic:  1 month/next available

## 2023-05-19 ENCOUNTER — OFFICE VISIT (OUTPATIENT)
Dept: PSYCHIATRY | Facility: CLINIC | Age: 33
End: 2023-05-19
Payer: COMMERCIAL

## 2023-05-19 VITALS
WEIGHT: 170.63 LBS | HEART RATE: 91 BPM | SYSTOLIC BLOOD PRESSURE: 107 MMHG | BODY MASS INDEX: 34.46 KG/M2 | DIASTOLIC BLOOD PRESSURE: 64 MMHG

## 2023-05-19 DIAGNOSIS — F84.5 ASPERGER SYNDROME: Primary | ICD-10-CM

## 2023-05-19 DIAGNOSIS — F32.A DEPRESSION, UNSPECIFIED DEPRESSION TYPE: ICD-10-CM

## 2023-05-19 DIAGNOSIS — F41.1 GAD (GENERALIZED ANXIETY DISORDER): ICD-10-CM

## 2023-05-19 PROCEDURE — 99214 OFFICE O/P EST MOD 30 MIN: CPT | Mod: S$GLB,,, | Performed by: PSYCHIATRY & NEUROLOGY

## 2023-05-19 PROCEDURE — 99214 PR OFFICE/OUTPT VISIT, EST, LEVL IV, 30-39 MIN: ICD-10-PCS | Mod: S$GLB,,, | Performed by: PSYCHIATRY & NEUROLOGY

## 2023-05-19 PROCEDURE — 99999 PR PBB SHADOW E&M-EST. PATIENT-LVL II: ICD-10-PCS | Mod: PBBFAC,,, | Performed by: PSYCHIATRY & NEUROLOGY

## 2023-05-19 PROCEDURE — 99999 PR PBB SHADOW E&M-EST. PATIENT-LVL II: CPT | Mod: PBBFAC,,, | Performed by: PSYCHIATRY & NEUROLOGY

## 2023-05-19 RX ORDER — BUPROPION HYDROCHLORIDE 300 MG/1
300 TABLET ORAL DAILY
Qty: 90 TABLET | Refills: 0 | Status: SHIPPED | OUTPATIENT
Start: 2023-05-19 | End: 2023-09-08 | Stop reason: SDUPTHER

## 2023-05-19 RX ORDER — ESCITALOPRAM OXALATE 20 MG/1
20 TABLET ORAL DAILY
Qty: 90 TABLET | Refills: 0 | Status: SHIPPED | OUTPATIENT
Start: 2023-05-19 | End: 2023-09-08 | Stop reason: SDUPTHER

## 2023-05-19 NOTE — PROGRESS NOTES
Outpatient Psychiatry Follow-Up Visit (MD/NP)    5/19/2023    Clinical Status of Patient:  Outpatient (Ambulatory)    Chief Complaint:  Tammy Bonner is a 32 y.o. female who presents today for follow-up of anxiety primarily, hx of low mood, stuttering, asperger's associated issues        Met with patient.      Interval History and Content of Current Session:  Interim Events/Subjective Report/Content of Current Session:   Pt reports she is slightly improved on the higher dose of wellbutrin..  Motivaton to do chores a little better.  Eventually does them, with a delay. Found seth, Me+ putting daily tasks on itbetter care of skin and taking multivitamins.  Seth hellps with spreading tasks through the week.  Work is less busy.  Has been doing some reading.  Anger remains much improved.  Is not having anxiety attacks or feeling down on herself.   More content with herself, less self critcal .  No better off dead feelings.      Does not notice any side effects       Psychotherapy:  Target symptoms: mood swings, impulsivity , SI  Why chosen therapy is appropriate versus another modality: relevant to diagnosis, patient responds to this modality  Outcome monitoring methods: self-report, observation  Therapeutic intervention type: supportive psychotherapy  Topics discussed/themes: work stress, building skills sets for symptom management, symptom recognition.    The patient's response to the intervention is accepting. The patient's progress toward treatment goals is fair.   Duration of intervention:  mins    Review of Systems   Constitutional:  Negative for chills and fever.   Respiratory:  Negative for cough, shortness of breath and wheezing.    Cardiovascular:  Negative for chest pain and palpitations.       Past Medical, Family and Social History: The patient's past medical, family and social history have been reviewed and updated as appropriate within the electronic medical record - see encounter notes.    Compliance:  yes    Side effects: None    Risk Parameters:  Patient reports no suicidal ideation  Patient reports no homicidal ideation  Patient reports no self-injurious behavior  Patient reports no violent behavior    Exam (detailed: at least 9 elements; comprehensive: all 15 elements)   Constitutional  Vitals: 1 Most recent vital signs, dated less than 90 days prior to this appointment, were reviewed.    Vitals:    05/19/23 0805   BP: 107/64   Pulse: 91   Weight: 77.4 kg (170 lb 10.2 oz)      General:  age appropriate, casually dressed, neatly groomed     Musculoskeletal  Muscle Strength/Tone:  no dyskinesia, no tic   Gait & Station:  Ambulates without difficulty during session     Psychiatric  Speech:  clearly audible, not pressured , quite verbal   Mood:   Affect:  euthymic  congruent and appropriate   Thought Process:  logical   Associations:  intact   Thought Content:  normal, no suicidality, no homicidality, delusions, or paranoia, focused on work   Insight:   Judgement:  fair  adequate to circumstances   Orientation:  grossly intact   Memory: intact for content of interview   Language: grossly intact   Attention Span & Concentration:  able to focus   Fund of Knowledge:  intact and appropriate to age and level of education     Assessment and Diagnosis   Status/Progress: Based on the examination today, the patient's problem(s) is/are well controlled.  New problems have been presented today.   Comorbidities are complicating management of the primary condition:Asperger's complicating her DORIS.  There are no active rule-out diagnoses for this patient at this time.    General Impression: 32 y.o. F with Asperger's, depression, and DORIS.  DORIS relatively well controlled currently.   Has some problems with anger at times, typically with parents.  Depression is a more recent problem.    Diagnosis:  Asperger's Disorder (299.80)  Generalized Anxiety Disorder (300.02).  Depression unspec      Intervention/Counseling/Treatment Plan    Continue Wellbutrin  mg daily  Continue lexapro 20 mg daily  Continue psychotherapy with Roxane Berger          Return to Clinic: 3 months

## 2023-06-06 ENCOUNTER — OFFICE VISIT (OUTPATIENT)
Dept: URGENT CARE | Facility: CLINIC | Age: 33
End: 2023-06-06
Payer: COMMERCIAL

## 2023-06-06 VITALS
SYSTOLIC BLOOD PRESSURE: 120 MMHG | OXYGEN SATURATION: 97 % | HEART RATE: 95 BPM | DIASTOLIC BLOOD PRESSURE: 80 MMHG | HEIGHT: 59 IN | WEIGHT: 170 LBS | RESPIRATION RATE: 18 BRPM | BODY MASS INDEX: 34.27 KG/M2 | TEMPERATURE: 98 F

## 2023-06-06 DIAGNOSIS — H60.502 ACUTE NONINFECTIVE OTITIS EXTERNA OF LEFT EAR, UNSPECIFIED TYPE: ICD-10-CM

## 2023-06-06 DIAGNOSIS — H61.23 BILATERAL IMPACTED CERUMEN: Primary | ICD-10-CM

## 2023-06-06 LAB — Lab: NORMAL

## 2023-06-06 PROCEDURE — 99203 PR OFFICE/OUTPT VISIT, NEW, LEVL III, 30-44 MIN: ICD-10-PCS | Mod: 25,S$GLB,, | Performed by: NURSE PRACTITIONER

## 2023-06-06 PROCEDURE — 69209 REMOVE IMPACTED EAR WAX UNI: CPT | Mod: 50,S$GLB,, | Performed by: NURSE PRACTITIONER

## 2023-06-06 PROCEDURE — 69209 EAR CERUMEN REMOVAL: ICD-10-PCS | Mod: 50,S$GLB,, | Performed by: NURSE PRACTITIONER

## 2023-06-06 PROCEDURE — 99203 OFFICE O/P NEW LOW 30 MIN: CPT | Mod: 25,S$GLB,, | Performed by: NURSE PRACTITIONER

## 2023-06-06 RX ORDER — NEOMYCIN SULFATE, POLYMYXIN B SULFATE, HYDROCORTISONE 3.5; 10000; 1 MG/ML; [USP'U]/ML; MG/ML
3 SOLUTION/ DROPS AURICULAR (OTIC) 3 TIMES DAILY
Qty: 10 ML | Refills: 0 | Status: SHIPPED | OUTPATIENT
Start: 2023-06-06 | End: 2023-06-13

## 2023-06-06 NOTE — PATIENT INSTRUCTIONS
Bilateral impacted cerumen  -     Ear wax removal    Acute noninfective otitis externa of left ear, unspecified type  -     neomycin-polymyxin-hydrocortisone (CORTISPORIN) otic solution; Place 3 drops into the left ear 3 (three) times daily. for 7 days  Dispense: 10 mL; Refill: 0      Ear Wax     - Use Over the Counter Debrox as directed    - Avoid cleaning ears with any foreign objects    -  Please return here or go to the Emergency Department for any concerns or worsening of condition.    -  Please follow up with your primary care doctor or specialist in the next 48-72hrs as needed.

## 2023-06-06 NOTE — PROGRESS NOTES
"Subjective:      Patient ID: Tammy Bonner is a 33 y.o. female.    Vitals:  height is 4' 11" (1.499 m) and weight is 77.1 kg (170 lb). Her temperature is 97.6 °F (36.4 °C). Her blood pressure is 120/80 and her pulse is 95. Her respiration is 18 and oxygen saturation is 97%.     Chief Complaint: Ear Fullness (Left ear fullness)      Provider note begins below:    Pt is a 33 yr female presenting with bilateral ear fullness (left > right).  Symptoms started about two days ago. Treatments at home include ear ear wash.    Ear Fullness   There is pain in both ears. This is a new problem. The problem occurs constantly. The problem has been gradually worsening. There has been no fever. The pain is at a severity of 0/10. The patient is experiencing no pain. Associated symptoms include coughing and hearing loss. Pertinent negatives include no diarrhea, ear discharge, headaches, neck pain, rhinorrhea, sore throat or vomiting. She has tried ear drops for the symptoms. The treatment provided no relief.     Constitution: Negative for chills, fatigue and fever.   HENT:  Positive for hearing loss. Negative for ear pain, ear discharge, congestion, sinus pain and sore throat.    Neck: Negative for neck pain.   Cardiovascular:  Negative for chest pain.   Respiratory:  Positive for cough. Negative for sputum production and shortness of breath.    Gastrointestinal:  Negative for nausea, vomiting and diarrhea.   Musculoskeletal:  Negative for muscle ache.   Neurological:  Negative for headaches.    Objective:     Physical Exam   Constitutional: She is oriented to person, place, and time.   HENT:   Head: Normocephalic.   Ears:   Right Ear: Hearing and external ear normal. There is cerumen present. No no drainage, swelling or tenderness. Tympanic membrane is not erythematous, not retracted and not bulging. No middle ear effusion.   Left Ear: Hearing and external ear normal. There is tenderness and cerumen present. No no drainage or " swelling. Tympanic membrane is not erythematous, not retracted and not bulging.  No middle ear effusion.   Nose: Nose normal. No mucosal edema, rhinorrhea or purulent discharge. Right sinus exhibits no maxillary sinus tenderness and no frontal sinus tenderness. Left sinus exhibits no maxillary sinus tenderness and no frontal sinus tenderness.   Mouth/Throat: Uvula is midline, oropharynx is clear and moist and mucous membranes are normal. No uvula swelling. No oropharyngeal exudate, posterior oropharyngeal edema or posterior oropharyngeal erythema.   Pre-procedure: bilateral ears fully occluded with soft cerumen.  Post-procedure: bilateral canals free of cerumen.  Left canal has mild erythema and edema.        Comments: Pre-procedure: bilateral ears fully occluded with soft cerumen.  Post-procedure: bilateral canals free of cerumen.  Left canal has mild erythema and edema.    Cardiovascular: Normal rate and regular rhythm.   Pulmonary/Chest: Effort normal and breath sounds normal.   Neurological: She is alert and oriented to person, place, and time.   Skin: Skin is warm and dry.   Nursing note and vitals reviewed.    Assessment:     1. Bilateral impacted cerumen    2. Acute noninfective otitis externa of left ear, unspecified type        Plan:       Bilateral impacted cerumen  -     Ear wax removal  -     Ear Cerumen Removal    Acute noninfective otitis externa of left ear, unspecified type  -     neomycin-polymyxin-hydrocortisone (CORTISPORIN) otic solution; Place 3 drops into the left ear 3 (three) times daily. for 7 days  Dispense: 10 mL; Refill: 0

## 2023-06-06 NOTE — PROCEDURES
"Ear Cerumen Removal    Date/Time: 6/6/2023 10:15 AM  Performed by: CONCETTA Pierson  Authorized by: CONCETTA Pierson     Time out: Immediately prior to procedure a "time out" was called to verify the correct patient, procedure, equipment, support staff and site/side marked as required.    Consent Done?:  Yes (Verbal)    Local anesthetic:  None  Ceruminolytics applied: Ceruminolytics applied prior to the procedure    Medication Used:  Cerumenex  Location details:  Both ears  Procedure type: irrigation    Cerumen  Removal Results:  Cerumen completely removed  Patient tolerance:  Patient tolerated the procedure well with no immediate complications  "

## 2023-09-11 RX ORDER — BUPROPION HYDROCHLORIDE 300 MG/1
300 TABLET ORAL DAILY
Qty: 90 TABLET | Refills: 0 | Status: SHIPPED | OUTPATIENT
Start: 2023-09-11 | End: 2023-11-14 | Stop reason: SDUPTHER

## 2023-09-11 RX ORDER — ESCITALOPRAM OXALATE 20 MG/1
20 TABLET ORAL DAILY
Qty: 90 TABLET | Refills: 0 | Status: SHIPPED | OUTPATIENT
Start: 2023-09-11 | End: 2023-11-14 | Stop reason: SDUPTHER

## 2023-11-14 ENCOUNTER — OFFICE VISIT (OUTPATIENT)
Dept: PSYCHIATRY | Facility: CLINIC | Age: 33
End: 2023-11-14
Payer: COMMERCIAL

## 2023-11-14 DIAGNOSIS — F84.5 ASPERGER SYNDROME: Primary | ICD-10-CM

## 2023-11-14 DIAGNOSIS — F32.A DEPRESSION, UNSPECIFIED DEPRESSION TYPE: ICD-10-CM

## 2023-11-14 DIAGNOSIS — F41.1 GAD (GENERALIZED ANXIETY DISORDER): ICD-10-CM

## 2023-11-14 PROCEDURE — 99214 PR OFFICE/OUTPT VISIT, EST, LEVL IV, 30-39 MIN: ICD-10-PCS | Mod: 95,,, | Performed by: PSYCHIATRY & NEUROLOGY

## 2023-11-14 PROCEDURE — 99214 OFFICE O/P EST MOD 30 MIN: CPT | Mod: 95,,, | Performed by: PSYCHIATRY & NEUROLOGY

## 2023-11-14 RX ORDER — ESCITALOPRAM OXALATE 20 MG/1
20 TABLET ORAL DAILY
Qty: 90 TABLET | Refills: 0 | Status: SHIPPED | OUTPATIENT
Start: 2023-11-14 | End: 2024-03-04

## 2023-11-14 RX ORDER — BUPROPION HYDROCHLORIDE 300 MG/1
300 TABLET ORAL DAILY
Qty: 90 TABLET | Refills: 0 | Status: SHIPPED | OUTPATIENT
Start: 2023-11-14 | End: 2024-03-13

## 2023-11-14 NOTE — PROGRESS NOTES
"The patient location is: home  The chief complaint leading to consultation is: depression and anxiety    Visit type: audiovisual    Face to Face time with patient: 28 mins  33 minutes of total time spent on the encounter, which includes face to face time and non-face to face time preparing to see the patient (eg, review of tests), Obtaining and/or reviewing separately obtained history, Documenting clinical information in the electronic or other health record, Independently interpreting results (not separately reported) and communicating results to the patient/family/caregiver, or Care coordination (not separately reported).     Each patient to whom he or she provides medical services by telemedicine is:  (1) informed of the relationship between the physician and patient and the respective role of any other health care provider with respect to management of the patient; and (2) notified that he or she may decline to receive medical services by telemedicine and may withdraw from such care at any time.    Notes:     Outpatient Psychiatry Follow-Up Visit (MD/NP)    11/14/2023    Clinical Status of Patient:  Outpatient (Ambulatory)    Chief Complaint:  Tammy Bonner is a 33 y.o. female who presents today for follow-up of anxiety primarily, hx of low mood, stuttering, asperger's associated issues        Met with patient.      Interval History and Content of Current Session:  Interim Events/Subjective Report/Content of Current Session:   Pt reports she is doing okay.  Every now and then some "downfalls" but reports not significantly.  Moments of feeling a little down.  Not worse.  Self doubt.  Still having problems with motivation to lose weight.  Finds herself feeling lazy after work.  Denies crying spells or feeling don for long periods.  Not recently desired death.   Sleeping well.  Getting out of bed at a reasonable time by putting alarm clock on other side of the room.  Work has been okay.  Recently has learned " of some lay offs causing anxiety.  Getting along well with parents and fiancee.      Sees Roxane Berger monthly and texts her weekly.          Psychotherapy:  Target symptoms: mood swings, impulsivity , SI  Why chosen therapy is appropriate versus another modality: relevant to diagnosis, patient responds to this modality  Outcome monitoring methods: self-report, observation  Therapeutic intervention type: supportive psychotherapy  Topics discussed/themes: work stress, building skills sets for symptom management, symptom recognition.    The patient's response to the intervention is accepting. The patient's progress toward treatment goals is fair.   Duration of intervention:  mins    Review of Systems   Constitutional:  Negative for chills and fever.   Respiratory:  Negative for cough, shortness of breath and wheezing.    Cardiovascular:  Negative for chest pain and palpitations.         Past Medical, Family and Social History: The patient's past medical, family and social history have been reviewed and updated as appropriate within the electronic medical record - see encounter notes.    Compliance: yes    Side effects: None    Risk Parameters:  Patient reports no suicidal ideation  Patient reports no homicidal ideation  Patient reports no self-injurious behavior  Patient reports no violent behavior    Exam (detailed: at least 9 elements; comprehensive: all 15 elements)   Constitutional  Vitals: 1 Most recent vital signs, dated less than 90 days prior to this appointment, were reviewed.    There were no vitals filed for this visit.     General:  age appropriate, casually dressed, neatly groomed     Musculoskeletal  Muscle Strength/Tone:  no dyskinesia, no tic   Gait & Station:  Not observed     Psychiatric  Speech:  clearly audible, not pressured , quite verbal   Mood:   Affect:  euthymic  congruent and appropriate   Thought Process:  logical   Associations:  intact   Thought Content:  normal, no suicidality, no  homicidality, delusions, or paranoia   Insight:   Judgement:  fair  adequate to circumstances   Orientation:  grossly intact   Memory: intact for content of interview   Language: grossly intact   Attention Span & Concentration:  able to focus   Fund of Knowledge:  intact and appropriate to age and level of education     Assessment and Diagnosis   Status/Progress: Based on the examination today, the patient's problem(s) is/are well controlled.  New problems have been presented today.   Comorbidities are complicating management of the primary condition:Asperger's complicating her DORIS.  There are no active rule-out diagnoses for this patient at this time.    General Impression: 33 y.o. F with Asperger's, depression, and DORIS.  DORIS relatively well controlled currently.   Has some problems with anger at times, typically with parents.  Depression is a more recent problem.    Diagnosis:  Asperger's Disorder (299.80)  Generalized Anxiety Disorder (300.02).  Depression unspec      Intervention/Counseling/Treatment Plan   Continue Wellbutrin  mg daily  Continue lexapro 20 mg daily  Continue psychotherapy with Roxane Berger          Return to Clinic: 3 months

## 2023-11-21 ENCOUNTER — PATIENT MESSAGE (OUTPATIENT)
Dept: INTERNAL MEDICINE | Facility: CLINIC | Age: 33
End: 2023-11-21
Payer: COMMERCIAL

## 2023-11-22 ENCOUNTER — TELEPHONE (OUTPATIENT)
Dept: INTERNAL MEDICINE | Facility: CLINIC | Age: 33
End: 2023-11-22
Payer: COMMERCIAL

## 2023-11-22 DIAGNOSIS — Z00.00 ANNUAL PHYSICAL EXAM: Primary | ICD-10-CM

## 2023-12-05 ENCOUNTER — LAB VISIT (OUTPATIENT)
Dept: LAB | Facility: HOSPITAL | Age: 33
End: 2023-12-05
Payer: COMMERCIAL

## 2023-12-05 DIAGNOSIS — Z00.00 ANNUAL PHYSICAL EXAM: ICD-10-CM

## 2023-12-05 LAB
ALBUMIN SERPL BCP-MCNC: 3.7 G/DL (ref 3.5–5.2)
ALP SERPL-CCNC: 79 U/L (ref 55–135)
ALT SERPL W/O P-5'-P-CCNC: 25 U/L (ref 10–44)
ANION GAP SERPL CALC-SCNC: 10 MMOL/L (ref 8–16)
AST SERPL-CCNC: 23 U/L (ref 10–40)
BASOPHILS # BLD AUTO: 0.05 K/UL (ref 0–0.2)
BASOPHILS NFR BLD: 0.6 % (ref 0–1.9)
BILIRUB SERPL-MCNC: 0.5 MG/DL (ref 0.1–1)
BUN SERPL-MCNC: 14 MG/DL (ref 6–20)
CALCIUM SERPL-MCNC: 9.7 MG/DL (ref 8.7–10.5)
CHLORIDE SERPL-SCNC: 104 MMOL/L (ref 95–110)
CHOLEST SERPL-MCNC: 219 MG/DL (ref 120–199)
CHOLEST/HDLC SERPL: 3 {RATIO} (ref 2–5)
CO2 SERPL-SCNC: 27 MMOL/L (ref 23–29)
CREAT SERPL-MCNC: 0.9 MG/DL (ref 0.5–1.4)
DIFFERENTIAL METHOD: ABNORMAL
EOSINOPHIL # BLD AUTO: 0.3 K/UL (ref 0–0.5)
EOSINOPHIL NFR BLD: 2.9 % (ref 0–8)
ERYTHROCYTE [DISTWIDTH] IN BLOOD BY AUTOMATED COUNT: 12.6 % (ref 11.5–14.5)
EST. GFR  (NO RACE VARIABLE): >60 ML/MIN/1.73 M^2
ESTIMATED AVG GLUCOSE: 94 MG/DL (ref 68–131)
GLUCOSE SERPL-MCNC: 96 MG/DL (ref 70–110)
HBA1C MFR BLD: 4.9 % (ref 4–5.6)
HCT VFR BLD AUTO: 39.8 % (ref 37–48.5)
HDLC SERPL-MCNC: 72 MG/DL (ref 40–75)
HDLC SERPL: 32.9 % (ref 20–50)
HGB BLD-MCNC: 13.5 G/DL (ref 12–16)
IMM GRANULOCYTES # BLD AUTO: 0.09 K/UL (ref 0–0.04)
IMM GRANULOCYTES NFR BLD AUTO: 1 % (ref 0–0.5)
LDLC SERPL CALC-MCNC: 103.2 MG/DL (ref 63–159)
LYMPHOCYTES # BLD AUTO: 2.5 K/UL (ref 1–4.8)
LYMPHOCYTES NFR BLD: 28.9 % (ref 18–48)
MCH RBC QN AUTO: 30.3 PG (ref 27–31)
MCHC RBC AUTO-ENTMCNC: 33.9 G/DL (ref 32–36)
MCV RBC AUTO: 89 FL (ref 82–98)
MONOCYTES # BLD AUTO: 0.7 K/UL (ref 0.3–1)
MONOCYTES NFR BLD: 8.3 % (ref 4–15)
NEUTROPHILS # BLD AUTO: 5 K/UL (ref 1.8–7.7)
NEUTROPHILS NFR BLD: 58.3 % (ref 38–73)
NONHDLC SERPL-MCNC: 147 MG/DL
NRBC BLD-RTO: 0 /100 WBC
PLATELET # BLD AUTO: 428 K/UL (ref 150–450)
PMV BLD AUTO: 9.2 FL (ref 9.2–12.9)
POTASSIUM SERPL-SCNC: 4.4 MMOL/L (ref 3.5–5.1)
PROT SERPL-MCNC: 7.5 G/DL (ref 6–8.4)
RBC # BLD AUTO: 4.45 M/UL (ref 4–5.4)
SODIUM SERPL-SCNC: 141 MMOL/L (ref 136–145)
TRIGL SERPL-MCNC: 219 MG/DL (ref 30–150)
TSH SERPL DL<=0.005 MIU/L-ACNC: 2.08 UIU/ML (ref 0.4–4)
WBC # BLD AUTO: 8.64 K/UL (ref 3.9–12.7)

## 2023-12-05 PROCEDURE — 85025 COMPLETE CBC W/AUTO DIFF WBC: CPT | Performed by: INTERNAL MEDICINE

## 2023-12-05 PROCEDURE — 83036 HEMOGLOBIN GLYCOSYLATED A1C: CPT | Performed by: INTERNAL MEDICINE

## 2023-12-05 PROCEDURE — 84443 ASSAY THYROID STIM HORMONE: CPT | Performed by: INTERNAL MEDICINE

## 2023-12-05 PROCEDURE — 36415 COLL VENOUS BLD VENIPUNCTURE: CPT | Performed by: INTERNAL MEDICINE

## 2023-12-05 PROCEDURE — 80053 COMPREHEN METABOLIC PANEL: CPT | Performed by: INTERNAL MEDICINE

## 2023-12-05 PROCEDURE — 80061 LIPID PANEL: CPT | Performed by: INTERNAL MEDICINE

## 2023-12-12 ENCOUNTER — OFFICE VISIT (OUTPATIENT)
Dept: INTERNAL MEDICINE | Facility: CLINIC | Age: 33
End: 2023-12-12
Payer: COMMERCIAL

## 2023-12-12 ENCOUNTER — IMMUNIZATION (OUTPATIENT)
Dept: INTERNAL MEDICINE | Facility: CLINIC | Age: 33
End: 2023-12-12
Payer: COMMERCIAL

## 2023-12-12 VITALS
HEART RATE: 97 BPM | OXYGEN SATURATION: 98 % | SYSTOLIC BLOOD PRESSURE: 116 MMHG | BODY MASS INDEX: 37.33 KG/M2 | DIASTOLIC BLOOD PRESSURE: 70 MMHG | HEIGHT: 59 IN | WEIGHT: 185.19 LBS

## 2023-12-12 DIAGNOSIS — F43.23 ADJUSTMENT REACTION WITH ANXIETY AND DEPRESSION: ICD-10-CM

## 2023-12-12 DIAGNOSIS — E66.9 CLASS 2 OBESITY WITH BODY MASS INDEX (BMI) OF 37.0 TO 37.9 IN ADULT, UNSPECIFIED OBESITY TYPE, UNSPECIFIED WHETHER SERIOUS COMORBIDITY PRESENT: ICD-10-CM

## 2023-12-12 DIAGNOSIS — Z00.00 ANNUAL PHYSICAL EXAM: Primary | ICD-10-CM

## 2023-12-12 PROCEDURE — 3008F BODY MASS INDEX DOCD: CPT | Mod: CPTII,S$GLB,, | Performed by: INTERNAL MEDICINE

## 2023-12-12 PROCEDURE — 90686 IIV4 VACC NO PRSV 0.5 ML IM: CPT | Mod: S$GLB,,, | Performed by: INTERNAL MEDICINE

## 2023-12-12 PROCEDURE — 3074F SYST BP LT 130 MM HG: CPT | Mod: CPTII,S$GLB,, | Performed by: INTERNAL MEDICINE

## 2023-12-12 PROCEDURE — 3078F DIAST BP <80 MM HG: CPT | Mod: CPTII,S$GLB,, | Performed by: INTERNAL MEDICINE

## 2023-12-12 PROCEDURE — G0008 FLU VACCINE (QUAD) GREATER THAN OR EQUAL TO 3YO PRESERVATIVE FREE IM: ICD-10-PCS | Mod: S$GLB,,, | Performed by: INTERNAL MEDICINE

## 2023-12-12 PROCEDURE — G0008 ADMIN INFLUENZA VIRUS VAC: HCPCS | Mod: S$GLB,,, | Performed by: INTERNAL MEDICINE

## 2023-12-12 PROCEDURE — 3044F PR MOST RECENT HEMOGLOBIN A1C LEVEL <7.0%: ICD-10-PCS | Mod: CPTII,S$GLB,, | Performed by: INTERNAL MEDICINE

## 2023-12-12 PROCEDURE — 90686 FLU VACCINE (QUAD) GREATER THAN OR EQUAL TO 3YO PRESERVATIVE FREE IM: ICD-10-PCS | Mod: S$GLB,,, | Performed by: INTERNAL MEDICINE

## 2023-12-12 PROCEDURE — 3008F PR BODY MASS INDEX (BMI) DOCUMENTED: ICD-10-PCS | Mod: CPTII,S$GLB,, | Performed by: INTERNAL MEDICINE

## 2023-12-12 PROCEDURE — 3074F PR MOST RECENT SYSTOLIC BLOOD PRESSURE < 130 MM HG: ICD-10-PCS | Mod: CPTII,S$GLB,, | Performed by: INTERNAL MEDICINE

## 2023-12-12 PROCEDURE — 99395 PREV VISIT EST AGE 18-39: CPT | Mod: S$GLB,,, | Performed by: INTERNAL MEDICINE

## 2023-12-12 PROCEDURE — 99999 PR PBB SHADOW E&M-EST. PATIENT-LVL III: ICD-10-PCS | Mod: PBBFAC,,, | Performed by: INTERNAL MEDICINE

## 2023-12-12 PROCEDURE — 99999 PR PBB SHADOW E&M-EST. PATIENT-LVL III: CPT | Mod: PBBFAC,,, | Performed by: INTERNAL MEDICINE

## 2023-12-12 PROCEDURE — 3044F HG A1C LEVEL LT 7.0%: CPT | Mod: CPTII,S$GLB,, | Performed by: INTERNAL MEDICINE

## 2023-12-12 PROCEDURE — 3078F PR MOST RECENT DIASTOLIC BLOOD PRESSURE < 80 MM HG: ICD-10-PCS | Mod: CPTII,S$GLB,, | Performed by: INTERNAL MEDICINE

## 2023-12-12 PROCEDURE — 99395 PR PREVENTIVE VISIT,EST,18-39: ICD-10-PCS | Mod: S$GLB,,, | Performed by: INTERNAL MEDICINE

## 2023-12-12 NOTE — PROGRESS NOTES
"Subjective:       Patient ID: Tammy Bonner is a 33 y.o. female.    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    HPI  Review of Systems   Constitutional:  Negative for activity change and unexpected weight change.   HENT:  Negative for hearing loss, rhinorrhea and trouble swallowing.    Eyes:  Negative for discharge and visual disturbance.   Respiratory:  Negative for chest tightness and wheezing.    Cardiovascular:  Negative for chest pain and palpitations.   Gastrointestinal:  Negative for blood in stool, constipation, diarrhea and vomiting.   Endocrine: Negative for polydipsia and polyuria.   Genitourinary:  Negative for difficulty urinating, dysuria, hematuria and menstrual problem.   Musculoskeletal:  Negative for arthralgias, joint swelling and neck pain.   Neurological:  Negative for weakness and headaches.   Psychiatric/Behavioral:  Positive for dysphoric mood. Negative for confusion.        Objective:    Blood pressure 116/70, pulse 97, height 4' 11" (1.499 m), weight 84 kg (185 lb 3 oz), SpO2 98 %.   Physical Exam  Constitutional:       General: She is not in acute distress.  HENT:      Head: Normocephalic.      Mouth/Throat:      Pharynx: Oropharynx is clear.   Eyes:      General: No scleral icterus.  Cardiovascular:      Rate and Rhythm: Normal rate and regular rhythm.      Heart sounds: Normal heart sounds. No murmur heard.     " No friction rub. No gallop.      Comments: Surgical scar right breast  Pulmonary:      Effort: Pulmonary effort is normal. No respiratory distress.      Breath sounds: Normal breath sounds.   Abdominal:      General: Bowel sounds are normal.      Palpations: Abdomen is soft. There is no mass.      Tenderness: There is no abdominal tenderness.   Musculoskeletal:      Cervical back: Neck supple.   Skin:     Findings: No erythema.   Neurological:      Mental Status: She is alert.   Psychiatric:         Mood and Affect: Mood normal.         Assessment:       1. Annual physical exam    2. Adjustment reaction with anxiety and depression    3. Class 2 obesity with body mass index (BMI) of 37.0 to 37.9 in adult, unspecified obesity type, unspecified whether serious comorbidity present        Plan:       Tammy was seen today for annual exam.    Diagnoses and all orders for this visit:    Annual physical exam    Adjustment reaction with anxiety and depression  Following with her psychiatrist currently taking Lexapro and Wellbutrin    Class 2 obesity with body mass index (BMI) of 37.0 to 37.9 in adult, unspecified obesity type, unspecified whether serious comorbidity present  We discussed weight has been trending up we discussed dietary measures and healthy exercise program  -     Ambulatory referral/consult to Bariatric Medicine; Future    Labs reviewed    Follow-up annually sooner if concern flu shot recommended             Never smoker

## 2024-01-22 ENCOUNTER — TELEPHONE (OUTPATIENT)
Dept: SURGERY | Facility: CLINIC | Age: 34
End: 2024-01-22
Payer: COMMERCIAL

## 2024-01-29 ENCOUNTER — PATIENT MESSAGE (OUTPATIENT)
Dept: BARIATRICS | Facility: CLINIC | Age: 34
End: 2024-01-29
Payer: COMMERCIAL

## 2024-02-06 ENCOUNTER — OFFICE VISIT (OUTPATIENT)
Dept: BARIATRICS | Facility: CLINIC | Age: 34
End: 2024-02-06
Payer: COMMERCIAL

## 2024-02-06 ENCOUNTER — PATIENT MESSAGE (OUTPATIENT)
Dept: RESEARCH | Facility: HOSPITAL | Age: 34
End: 2024-02-06
Payer: COMMERCIAL

## 2024-02-06 VITALS
WEIGHT: 178.5 LBS | OXYGEN SATURATION: 97 % | HEART RATE: 69 BPM | BODY MASS INDEX: 35.05 KG/M2 | HEIGHT: 60 IN | SYSTOLIC BLOOD PRESSURE: 120 MMHG | DIASTOLIC BLOOD PRESSURE: 75 MMHG

## 2024-02-06 DIAGNOSIS — Z71.3 ENCOUNTER FOR WEIGHT LOSS COUNSELING: ICD-10-CM

## 2024-02-06 DIAGNOSIS — E66.9 CLASS 2 OBESITY WITH BODY MASS INDEX (BMI) OF 37.0 TO 37.9 IN ADULT, UNSPECIFIED OBESITY TYPE, UNSPECIFIED WHETHER SERIOUS COMORBIDITY PRESENT: ICD-10-CM

## 2024-02-06 DIAGNOSIS — E66.09 CLASS 1 OBESITY DUE TO EXCESS CALORIES WITHOUT SERIOUS COMORBIDITY WITH BODY MASS INDEX (BMI) OF 34.0 TO 34.9 IN ADULT: Primary | ICD-10-CM

## 2024-02-06 PROCEDURE — 1160F RVW MEDS BY RX/DR IN RCRD: CPT | Mod: CPTII,S$GLB,, | Performed by: STUDENT IN AN ORGANIZED HEALTH CARE EDUCATION/TRAINING PROGRAM

## 2024-02-06 PROCEDURE — 3078F DIAST BP <80 MM HG: CPT | Mod: CPTII,S$GLB,, | Performed by: STUDENT IN AN ORGANIZED HEALTH CARE EDUCATION/TRAINING PROGRAM

## 2024-02-06 PROCEDURE — 99999 PR PBB SHADOW E&M-EST. PATIENT-LVL III: CPT | Mod: PBBFAC,,, | Performed by: STUDENT IN AN ORGANIZED HEALTH CARE EDUCATION/TRAINING PROGRAM

## 2024-02-06 PROCEDURE — 3074F SYST BP LT 130 MM HG: CPT | Mod: CPTII,S$GLB,, | Performed by: STUDENT IN AN ORGANIZED HEALTH CARE EDUCATION/TRAINING PROGRAM

## 2024-02-06 PROCEDURE — 3008F BODY MASS INDEX DOCD: CPT | Mod: CPTII,S$GLB,, | Performed by: STUDENT IN AN ORGANIZED HEALTH CARE EDUCATION/TRAINING PROGRAM

## 2024-02-06 PROCEDURE — 1159F MED LIST DOCD IN RCRD: CPT | Mod: CPTII,S$GLB,, | Performed by: STUDENT IN AN ORGANIZED HEALTH CARE EDUCATION/TRAINING PROGRAM

## 2024-02-06 PROCEDURE — 99204 OFFICE O/P NEW MOD 45 MIN: CPT | Mod: S$GLB,,, | Performed by: STUDENT IN AN ORGANIZED HEALTH CARE EDUCATION/TRAINING PROGRAM

## 2024-02-06 RX ORDER — TIRZEPATIDE 7.5 MG/.5ML
7.5 INJECTION, SOLUTION SUBCUTANEOUS
Qty: 4 PEN | Refills: 0 | Status: SHIPPED | OUTPATIENT
Start: 2024-04-02 | End: 2024-04-22

## 2024-02-06 RX ORDER — TIRZEPATIDE 5 MG/.5ML
5 INJECTION, SOLUTION SUBCUTANEOUS
Qty: 4 PEN | Refills: 0 | Status: SHIPPED | OUTPATIENT
Start: 2024-03-05 | End: 2024-04-22

## 2024-02-06 RX ORDER — TIRZEPATIDE 2.5 MG/.5ML
2.5 INJECTION, SOLUTION SUBCUTANEOUS
Qty: 4 PEN | Refills: 0 | Status: SHIPPED | OUTPATIENT
Start: 2024-02-06 | End: 2024-04-22

## 2024-02-06 NOTE — PATIENT INSTRUCTIONS
Start Zepbound 2.5 mg once a week for 4 weeks, then increase to 5 mg once a week for 4 weeks, then increase to 7.5 mg once a week for 4 weeks.    Decrease portions as soon as you start Zepbound. Avoid fried, greasy, fatty foods.     Some nausea in the first 2 weeks is not unusual.     If you get pain across the upper abdomen and around to your back, please call the office.     A savings card may be found on the Zepbound website.      Please be aware that Zepbound may reduce the efficacy of oral contraceptives.  A non-oral contraceptive method or addition of barrier method of contraception is recommended for 4 weeks after initiation of Zepbound and for 4 weeks after each dose escalation.      Food and Beverage Log:  Keep a log of all food and beverages that you consume.  Keeping track of calories will help you lose weight, because monitoring will help you become more aware of what you are eating and recognize your eating patterns.  Be mindful of your hunger level before eating and your satiety level after eating.  Just keeping records will help you make healthy changes in your diet and eat fewer calories.    Tips for keeping a calorie count:     Aim for the daily calorie goal of 1200 calories/day.     Record your food intake immediately after eating. If possible, look up calories before or immediately after eating.     Download an allen like [employer] Fitness Pal, Lose It!, or Immunetics (Code: 94252) To keep track of your calories.    Measuring foods (using measuring cups or spoons) will help you be more accurate with counting calories.     Keep a running calorie count throughout the day.     Count daily calories toward a weekly calorie total. If you eat too many calories one day, cut back slightly over the next few days to meet your weekly goal.       Calorie Goal  Daily Calorie Goal: 1200 calories  Macronutrients:  Protein - 120 grams (40%)  Carbohydrates - 90 grams (30%)  Fat - 40 grams (30%)  Weekly Calorie Goal: 8400  calories            Copyright © 2011, [facility]. For more information about The Healthy Eating Plate, please see The Nutrition Source, Department of Nutrition, [employer], www.thenutritionsource.org, and [employer], www.health.[employer].edu.      Meal Planning & Grocery Shopping    Meal planning builds the foundation for healthy eating. When you have structured ideas for healthy meals and foods available at home to prepare those meals, weight control becomes easier.  If only healthy foods are available at home, then you will be much more likely to eat healthy foods. And you will be less likely to go to a restaurant or  a fast food meal, which tend to be unhealthy and higher in calories than meals prepared at home.      Take 5-10 minutes each week to plan meals for the next 7 days.  Make a grocery list based on the meal plan.    Grocery Shopping Tips:  Shop on a full stomach.  Schedule your shopping for times when you are most motivated and able to be disciplined about your purchases. For example, after a stressful day at work it may be difficult to make the healthiest choices. Shopping at other times, such as early in the morning or after dinner, may be easier.  Focus your shopping on the outside aisles of the store, which tend to contain more fresh foods and lower calorie foods. The inside aisles tend to have more processed foods.  Stick to your list. Avoid buying unhealthy items just because they are on sale.   Compare nutrition labels to check the number of calories and percentage of fat.      What to buy:    Vegetables  Fresh vegetables  Frozen vegetables with no sauce or added salt  Canned vegetables with no sauce or added salt    Protein  Lean meats, such as chicken and turkey  Limit red meats, such as beef to no more than 1x/week  Limit processed meats, such as cold cuts, marquis, sausage, and hot dogs. Look for brands that have no nitrites and are  minimally processed. Consider turkey sausage or turkey marquis.  Fish and Shellfish  Eggs  Dried beans  Canned beans (reduced sodium)    Fat  Use healthy oils, such as olive oil or canola oil, for cooking, salad dressings, etc.  Unflavored nuts and seeds  Nut butters (no added sugar)    Dairy  Yogurt (no sugar added)  Cheese  Low-fat milk  Unsweetened nondairy milks (almond milk, soy milk, etc)    Fruit  Fresh Fruit  Frozen fruit with no added sugar  Canned fruit with no added sugar  Dried fruit with no added sugar  100% fruit juice    Whole Grains  Single ingredient grains, such as oats, quinoa, brown rice  Whole-wheat pasta  Sprouted whole-grain bread    What to avoid:  - Avoid fried foods  - Avoid foods with added sugar  - Avoid sugar-sweetened beverages  - Avoid ultra-processed foods      Lifestyle Activity    Lifestyle activity consists of all the activities that burn calories during the course of a normal day. Using the stairs, washing the dishes, or even getting up to turn off the television are all examples of lifestyle activity. All activities, no matter how small, burn calories. Increasing lifestyle activity can help with weight control, so building physical activity into your everyday routine is important.     A pedometer is a tool that can help you track how much lifestyle activity you are getting. It can help you stay active. A pedometer counts each step you take and displays your total steps on the screen. Many smartphones, including iPhones and Androids, have applications that automatically count your steps. If you decide to use this method, make sure you are holding or wearing your smartphone so it can count all of your steps.     During the next 2 weeks, aim for 5,000 or more steps per day. Each week aim to increase your daily step goal by 250 so that you will reach an average of 10,000 steps per day (equal to walking 4 to 5 miles).     Start making more active choices in your routine in order to  increase the amount of walking you do each day.     Strategies to Increase Lifestyle Activity:    Take several 5-10-minute walks during the day.   Set a reminder to take a 5 minute break from your desk every hour.   Choose the farthest entrance to a building that you are entering.   Host walking meetings at work.   Walk down the [place] to contact co-workers face-to-face, instead of emailing or calling.  Walk to a restroom, water cooler, or copy machine on a different floor at work.   Walk during your lunch break.   Park farther away in parking lots.   Get off the bus or train earlier and walk farther to your destination.   Take the stairs rather than the elevator or the escalator.   Start a walking club with co-workers or neighbors.   Walk - don't drive - for trips less than one mile.   Take an after-dinner walk with family.   Go for a walk while talking on your wireless phone.   Walk the dog more often.   If you prefer to stay indoors because of the weather, try walking in a shopping mall or doing laps around a large store.   Purchase a treadmill to use at home.   Schedule time for walking every week and stick to it like any other appointment.   Or think of your own strategy: _______________________________       Physical Activity    Physical activity improves your health and helps with weight control, especially weight loss maintenance.      When starting to incorporate an exercise routine into your day, it is helpful to set specific goals.  For example, I will walk at moderate intensity from 12:30-12:45pm on Monday, Wednesday, and Friday.  Schedule your exercise time into your calendar.  Think of any potential barriers that may come up and prevent you from exercising.  Develop solutions or back-up plans for when these barriers may arise.    Walking is an ideal activity to start with if you do not currently have an exercise routine. You can make the activity more fun by doing it with a friend or listening to  music or a book on tape while you do it. If you don't enjoy walking, think of other activities you like, such as bike riding or swimming.  Other alternatives include group fitness classes or exercise at home using DVDs, Apps, etc.    If you are new to exercising, then start with 10 minutes 3-4 days per week.  After two weeks, increase to 15 minutes 3-4 days per week.  If you already exercise more than this, continue at your higher level, or increase by 10-15 minutes if able.         Aerobic Activity  Aerobic Activity gets you breathing harder and your heart beating faster.  Eventually, you should work up to 150 - 300 minutes of moderate-intensity aerobic activity every week or 75 - 150 minutes of vigorous-intensity aerobic activity every week.  An easy way to gauge the intensity of your activity is with the Talk Test.  During moderate activity, you should be able to talk, but not sing without having to pause for a breath.  During vigorous activity, you shouldn't be able to say more than a few words without pausing for a breath.  You should not push yourself until you are completely out of breath, tired, or sore.    Examples of Aerobic Activity:  Walking  Running  Swimming  Biking  Playing sports like tennis or basketball  Dancing  Jumping Rope      Strength Training  It is also recommended that you perform muscle-strengthening activities at least 2 days per week.  Be sure to include activities that work all major muscle groups (legs, arms, abdomen, back, buttocks, chest, and shoulders)    Examples of Strength Training  Lifting weights  Working with resistance band  Using your body weight for resistance (squats, push-ups, sit-ups)  Pilates  Yoga      https://health.gov/moveyourway/activity-planner/      Remember to keep a record of your activity to track your progress.

## 2024-02-06 NOTE — PROGRESS NOTES
Subjective     Patient ID: Tammy Bonner is a 33 y.o. female.    Chief Complaint: Consult and Obesity    Patient presents for treatment of obesity.     Co-morbidities   Open angle with borderline findings, bilateral - has not had appt with optometry since prior to Covid  Anxiety and Depression - treated by Dr. Ugalde, on Lexapro, Wellbutrin XL    OCP for contraception    Negative for thyroid cancer    Weight History  Lowest adult weight: 145-150 lbs  Highest adult weight: 183 lbs     History of Weight Loss Efforts  No prior use of weight loss medications or structured programs    Current Physical Activity  Gym 1-2x/week - Radhika    Current Eating Habits  Breakfast - cold brew; cereal (chocolate special K, cinnamon toast crunch); banana  Lunch - leftovers; roll ups with low carb wrap and cheese  Dinner - pizza, pasta, mac & cheese, corn, hank salad with blackened chicken  Snacks - apples and peanut butter, animal crackers  Beverages - mostly water, coffee, smoothie    Medical Weight Loss  2/6/2024: 178.5 lbs, BMI 34.9, BFP 39.2%, BFM 70 lbs, SMM 62.6 lbs, BMR 1433 kcal      Review of Systems   Constitutional:  Negative for chills and fever.   Respiratory:  Negative for shortness of breath.    Cardiovascular:  Negative for chest pain and palpitations.   Gastrointestinal:  Negative for abdominal pain, nausea and vomiting.   Neurological:  Negative for dizziness and light-headedness.          Objective    Latest Reference Range & Units 12/05/23 07:21   WBC 3.90 - 12.70 K/uL 8.64   RBC 4.00 - 5.40 M/uL 4.45   Hemoglobin 12.0 - 16.0 g/dL 13.5   Hematocrit 37.0 - 48.5 % 39.8   MCV 82 - 98 fL 89   MCH 27.0 - 31.0 pg 30.3   MCHC 32.0 - 36.0 g/dL 33.9   RDW 11.5 - 14.5 % 12.6   Platelet Count 150 - 450 K/uL 428   MPV 9.2 - 12.9 fL 9.2   Gran % 38.0 - 73.0 % 58.3   Lymph % 18.0 - 48.0 % 28.9   Mono % 4.0 - 15.0 % 8.3   Eos % 0.0 - 8.0 % 2.9   Basophil % 0.0 - 1.9 % 0.6   Immature Granulocytes 0.0 - 0.5 % 1.0 (H)    Gran # (ANC) 1.8 - 7.7 K/uL 5.0   Lymph # 1.0 - 4.8 K/uL 2.5   Mono # 0.3 - 1.0 K/uL 0.7   Eos # 0.0 - 0.5 K/uL 0.3   Baso # 0.00 - 0.20 K/uL 0.05   Immature Grans (Abs) 0.00 - 0.04 K/uL 0.09 (H)   nRBC 0 /100 WBC 0   Differential Method  Automated   Sodium 136 - 145 mmol/L 141   Potassium 3.5 - 5.1 mmol/L 4.4   Chloride 95 - 110 mmol/L 104   CO2 23 - 29 mmol/L 27   Anion Gap 8 - 16 mmol/L 10   BUN 6 - 20 mg/dL 14   Creatinine 0.5 - 1.4 mg/dL 0.9   eGFR >60 mL/min/1.73 m^2 >60.0   Glucose 70 - 110 mg/dL 96   Calcium 8.7 - 10.5 mg/dL 9.7   ALP 55 - 135 U/L 79   PROTEIN TOTAL 6.0 - 8.4 g/dL 7.5   Albumin 3.5 - 5.2 g/dL 3.7   BILIRUBIN TOTAL 0.1 - 1.0 mg/dL 0.5   AST 10 - 40 U/L 23   ALT 10 - 44 U/L 25   Cholesterol Total 120 - 199 mg/dL 219 (H)   HDL 40 - 75 mg/dL 72   HDL/Cholesterol Ratio 20.0 - 50.0 % 32.9   Non-HDL Cholesterol mg/dL 147   Total Cholesterol/HDL Ratio 2.0 - 5.0  3.0   Triglycerides 30 - 150 mg/dL 219 (H)   LDL Cholesterol 63.0 - 159.0 mg/dL 103.2   Hemoglobin A1C External 4.0 - 5.6 % 4.9   Estimated Avg Glucose 68 - 131 mg/dL 94   TSH 0.400 - 4.000 uIU/mL 2.084   (H): Data is abnormally high    Vitals:    02/06/24 0907   BP: 120/75   Pulse: 69       Physical Exam  Vitals reviewed.   Constitutional:       General: She is not in acute distress.     Appearance: Normal appearance. She is obese. She is not ill-appearing, toxic-appearing or diaphoretic.   HENT:      Head: Normocephalic and atraumatic.   Cardiovascular:      Rate and Rhythm: Normal rate.   Pulmonary:      Effort: Pulmonary effort is normal. No respiratory distress.   Skin:     General: Skin is warm and dry.   Neurological:      Mental Status: She is alert and oriented to person, place, and time.            Assessment and Plan     1. Class 1 obesity due to excess calories without serious comorbidity with body mass index (BMI) of 34.0 to 34.9 in adult  -     tirzepatide, weight loss, (ZEPBOUND) 2.5 mg/0.5 mL PnIj; Inject 2.5 mg into the  skin every 7 days.  Dispense: 4 Pen; Refill: 0  -     tirzepatide, weight loss, (ZEPBOUND) 5 mg/0.5 mL PnIj; Inject 5 mg into the skin every 7 days.  Dispense: 4 Pen; Refill: 0  -     tirzepatide, weight loss, (ZEPBOUND) 7.5 mg/0.5 mL PnIj; Inject 7.5 mg into the skin every 7 days.  Dispense: 4 Pen; Refill: 0    2. Encounter for weight loss counseling        - Log all food and beverage intake with a daily calorie goal of 1200 calories per day    - Moderate intensity aerobic exercise for 150 minutes per week

## 2024-02-08 ENCOUNTER — TELEPHONE (OUTPATIENT)
Dept: RESEARCH | Facility: HOSPITAL | Age: 34
End: 2024-02-08
Payer: COMMERCIAL

## 2024-02-09 ENCOUNTER — PATIENT MESSAGE (OUTPATIENT)
Dept: BARIATRICS | Facility: CLINIC | Age: 34
End: 2024-02-09
Payer: COMMERCIAL

## 2024-02-09 DIAGNOSIS — E66.09 CLASS 1 OBESITY DUE TO EXCESS CALORIES WITHOUT SERIOUS COMORBIDITY WITH BODY MASS INDEX (BMI) OF 34.0 TO 34.9 IN ADULT: Primary | ICD-10-CM

## 2024-02-09 RX ORDER — METFORMIN HYDROCHLORIDE 500 MG/1
500 TABLET, EXTENDED RELEASE ORAL
Qty: 90 TABLET | Refills: 0 | Status: SHIPPED | OUTPATIENT
Start: 2024-02-09 | End: 2024-04-22 | Stop reason: SDUPTHER

## 2024-02-26 NOTE — TELEPHONE ENCOUNTER
Called Ms. Bonner to follow up on a portal message sent to her recently regarding an [facility] research study she qualifies to participate in. Discussed study. She stated she would think about it and call me back if she is interested in participating.

## 2024-03-04 RX ORDER — ESCITALOPRAM OXALATE 20 MG/1
20 TABLET ORAL
Qty: 90 TABLET | Refills: 0 | Status: SHIPPED | OUTPATIENT
Start: 2024-03-04

## 2024-03-13 RX ORDER — BUPROPION HYDROCHLORIDE 300 MG/1
300 TABLET ORAL
Qty: 90 TABLET | Refills: 0 | Status: SHIPPED | OUTPATIENT
Start: 2024-03-13

## 2024-04-17 ENCOUNTER — OFFICE VISIT (OUTPATIENT)
Dept: OPTOMETRY | Facility: CLINIC | Age: 34
End: 2024-04-17
Payer: COMMERCIAL

## 2024-04-17 DIAGNOSIS — H40.013 OPEN ANGLE WITH BORDERLINE FINDINGS, LOW RISK, BILATERAL: ICD-10-CM

## 2024-04-17 DIAGNOSIS — H52.13 MYOPIA OF BOTH EYES: ICD-10-CM

## 2024-04-17 PROCEDURE — 1160F RVW MEDS BY RX/DR IN RCRD: CPT | Mod: CPTII,S$GLB,, | Performed by: OPTOMETRIST

## 2024-04-17 PROCEDURE — 1159F MED LIST DOCD IN RCRD: CPT | Mod: CPTII,S$GLB,, | Performed by: OPTOMETRIST

## 2024-04-17 PROCEDURE — 92004 COMPRE OPH EXAM NEW PT 1/>: CPT | Mod: S$GLB,,, | Performed by: OPTOMETRIST

## 2024-04-17 PROCEDURE — 92020 GONIOSCOPY: CPT | Mod: S$GLB,,, | Performed by: OPTOMETRIST

## 2024-04-17 PROCEDURE — 92015 DETERMINE REFRACTIVE STATE: CPT | Mod: S$GLB,,, | Performed by: OPTOMETRIST

## 2024-04-17 PROCEDURE — 99999 PR PBB SHADOW E&M-EST. PATIENT-LVL III: CPT | Mod: PBBFAC,,, | Performed by: OPTOMETRIST

## 2024-04-17 NOTE — PROGRESS NOTES
HPI    34 Y/o female is here for routine eye exam with no C/o about ocular health     Pt denies pain and discomfort   No f/f    Eye med: otc AT'S OU PRN   Last edited by Tequila Martins MA on 4/17/2024 10:11 AM.            Assessment /Plan     For exam results, see Encounter Report.    Myopia of both eyes    Open angle with borderline findings, low risk, bilateral  -     [place] Visual Field - OU - Extended - Both Eyes; Future; Expected date: 05/17/2024  -     Posterior Segment OCT Optic Nerve- Both eyes; Future; Expected date: 05/17/2024        1. Glauc susp by CDs--see OCT (RNFL wnl OU).  iop hi normal without meds.  Last VF wnl.  +famhx (dad). Pach: 682/684.  Angles open by gonio OU.  Needs repeat VF since it has been 5 years  2. Slight myopia--wrote dist Rx for night driving.  Discussed CRIZAL for glare      PLAN:    HVF 24-2sf/OCT.  Appt w me after for iop ck/review

## 2024-04-21 ENCOUNTER — PATIENT MESSAGE (OUTPATIENT)
Dept: OBSTETRICS AND GYNECOLOGY | Facility: CLINIC | Age: 34
End: 2024-04-21
Payer: COMMERCIAL

## 2024-04-21 DIAGNOSIS — Z30.41 ENCOUNTER FOR SURVEILLANCE OF CONTRACEPTIVE PILLS: ICD-10-CM

## 2024-04-21 RX ORDER — NORGESTIMATE AND ETHINYL ESTRADIOL 0.25-0.035
1 KIT ORAL DAILY
Qty: 84 TABLET | Refills: 0 | Status: SHIPPED | OUTPATIENT
Start: 2024-04-21 | End: 2024-04-23 | Stop reason: SDUPTHER

## 2024-04-22 ENCOUNTER — OFFICE VISIT (OUTPATIENT)
Dept: BARIATRICS | Facility: CLINIC | Age: 34
End: 2024-04-22
Payer: COMMERCIAL

## 2024-04-22 VITALS
HEART RATE: 100 BPM | BODY MASS INDEX: 32.65 KG/M2 | DIASTOLIC BLOOD PRESSURE: 73 MMHG | WEIGHT: 167.19 LBS | OXYGEN SATURATION: 98 % | SYSTOLIC BLOOD PRESSURE: 123 MMHG

## 2024-04-22 DIAGNOSIS — Z71.3 ENCOUNTER FOR WEIGHT LOSS COUNSELING: ICD-10-CM

## 2024-04-22 DIAGNOSIS — E66.09 CLASS 1 OBESITY DUE TO EXCESS CALORIES WITHOUT SERIOUS COMORBIDITY WITH BODY MASS INDEX (BMI) OF 32.0 TO 32.9 IN ADULT: Primary | ICD-10-CM

## 2024-04-22 PROCEDURE — 3074F SYST BP LT 130 MM HG: CPT | Mod: CPTII,S$GLB,, | Performed by: STUDENT IN AN ORGANIZED HEALTH CARE EDUCATION/TRAINING PROGRAM

## 2024-04-22 PROCEDURE — 3008F BODY MASS INDEX DOCD: CPT | Mod: CPTII,S$GLB,, | Performed by: STUDENT IN AN ORGANIZED HEALTH CARE EDUCATION/TRAINING PROGRAM

## 2024-04-22 PROCEDURE — 99213 OFFICE O/P EST LOW 20 MIN: CPT | Mod: S$GLB,,, | Performed by: STUDENT IN AN ORGANIZED HEALTH CARE EDUCATION/TRAINING PROGRAM

## 2024-04-22 PROCEDURE — 1159F MED LIST DOCD IN RCRD: CPT | Mod: CPTII,S$GLB,, | Performed by: STUDENT IN AN ORGANIZED HEALTH CARE EDUCATION/TRAINING PROGRAM

## 2024-04-22 PROCEDURE — 99999 PR PBB SHADOW E&M-EST. PATIENT-LVL III: CPT | Mod: PBBFAC,,, | Performed by: STUDENT IN AN ORGANIZED HEALTH CARE EDUCATION/TRAINING PROGRAM

## 2024-04-22 PROCEDURE — 1160F RVW MEDS BY RX/DR IN RCRD: CPT | Mod: CPTII,S$GLB,, | Performed by: STUDENT IN AN ORGANIZED HEALTH CARE EDUCATION/TRAINING PROGRAM

## 2024-04-22 PROCEDURE — 3078F DIAST BP <80 MM HG: CPT | Mod: CPTII,S$GLB,, | Performed by: STUDENT IN AN ORGANIZED HEALTH CARE EDUCATION/TRAINING PROGRAM

## 2024-04-22 RX ORDER — METFORMIN HYDROCHLORIDE 500 MG/1
500 TABLET, EXTENDED RELEASE ORAL
Qty: 90 TABLET | Refills: 0 | Status: SHIPPED | OUTPATIENT
Start: 2024-04-22 | End: 2024-07-21

## 2024-04-22 NOTE — PROGRESS NOTES
Subjective     Patient ID: Tammy Bonner is a 33 y.o. female.    Chief Complaint: Follow-up, Obesity, and Weight Check    Patient presents for treatment of obesity.     Co-morbidities   Open angle with borderline findings, bilateral - has not had appt with optometry since prior to Covid  Anxiety and Depression - treated by Dr. Ugalde, on Lexapro, Wellbutrin XL    OCP for contraception    Negative for thyroid cancer    Weight History  Lowest adult weight: 145-150 lbs  Highest adult weight: 183 lbs     History of Weight Loss Efforts  No prior use of weight loss medications or structured programs    Current Physical Activity  Gym 1-2x/week - Radhika  [employer] Workouts  Treadmill     Current Eating Habits  Breakfast - cold brew; cereal (chocolate special K, cinnamon toast crunch); banana  Lunch - leftovers; roll ups with low carb wrap and cheese  Dinner - pizza, pasta, mac & cheese, corn, hank salad with blackened chicken  Snacks - apples and peanut butter, animal crackers  Beverages - mostly water, coffee, smoothie    Medical Weight Loss  2/6/2024: 178.5 lbs, BMI 34.9, BFP 39.2%, BFM 70 lbs, SMM 62.6 lbs, BMR 1433 kcal  4/22/2024: 167.2 lbs, BMI 32.7, BFP 37.2%, BFM 62 lbs, SMM 60.4 lbs, BMR 1399 kcal      Review of Systems   Constitutional:  Negative for chills and fever.   Respiratory:  Negative for shortness of breath.    Cardiovascular:  Negative for chest pain and palpitations.   Gastrointestinal:  Negative for abdominal pain, nausea and vomiting.   Neurological:  Negative for dizziness and light-headedness.          Objective    Latest Reference Range & Units 12/05/23 07:21   WBC 3.90 - 12.70 K/uL 8.64   RBC 4.00 - 5.40 M/uL 4.45   Hemoglobin 12.0 - 16.0 g/dL 13.5   Hematocrit 37.0 - 48.5 % 39.8   MCV 82 - 98 fL 89   MCH 27.0 - 31.0 pg 30.3   MCHC 32.0 - 36.0 g/dL 33.9   RDW 11.5 - 14.5 % 12.6   Platelet Count 150 - 450 K/uL 428   MPV 9.2 - 12.9 fL 9.2   Gran % 38.0 - 73.0 % 58.3   Lymph % 18.0 -  48.0 % 28.9   Mono % 4.0 - 15.0 % 8.3   Eos % 0.0 - 8.0 % 2.9   Basophil % 0.0 - 1.9 % 0.6   Immature Granulocytes 0.0 - 0.5 % 1.0 (H)   Gran # (ANC) 1.8 - 7.7 K/uL 5.0   Lymph # 1.0 - 4.8 K/uL 2.5   Mono # 0.3 - 1.0 K/uL 0.7   Eos # 0.0 - 0.5 K/uL 0.3   Baso # 0.00 - 0.20 K/uL 0.05   Immature Grans (Abs) 0.00 - 0.04 K/uL 0.09 (H)   nRBC 0 /100 WBC 0   Differential Method  Automated   Sodium 136 - 145 mmol/L 141   Potassium 3.5 - 5.1 mmol/L 4.4   Chloride 95 - 110 mmol/L 104   CO2 23 - 29 mmol/L 27   Anion Gap 8 - 16 mmol/L 10   BUN 6 - 20 mg/dL 14   Creatinine 0.5 - 1.4 mg/dL 0.9   eGFR >60 mL/min/1.73 m^2 >60.0   Glucose 70 - 110 mg/dL 96   Calcium 8.7 - 10.5 mg/dL 9.7   ALP 55 - 135 U/L 79   PROTEIN TOTAL 6.0 - 8.4 g/dL 7.5   Albumin 3.5 - 5.2 g/dL 3.7   BILIRUBIN TOTAL 0.1 - 1.0 mg/dL 0.5   AST 10 - 40 U/L 23   ALT 10 - 44 U/L 25   Cholesterol Total 120 - 199 mg/dL 219 (H)   HDL 40 - 75 mg/dL 72   HDL/Cholesterol Ratio 20.0 - 50.0 % 32.9   Non-HDL Cholesterol mg/dL 147   Total Cholesterol/HDL Ratio 2.0 - 5.0  3.0   Triglycerides 30 - 150 mg/dL 219 (H)   LDL Cholesterol 63.0 - 159.0 mg/dL 103.2   Hemoglobin A1C External 4.0 - 5.6 % 4.9   Estimated Avg Glucose 68 - 131 mg/dL 94   TSH 0.400 - 4.000 uIU/mL 2.084   (H): Data is abnormally high    Vitals:    04/22/24 1121   BP: 123/73   Pulse: 100         Physical Exam  Vitals reviewed.   Constitutional:       General: She is not in acute distress.     Appearance: Normal appearance. She is obese. She is not ill-appearing, toxic-appearing or diaphoretic.   HENT:      Head: Normocephalic and atraumatic.   Cardiovascular:      Rate and Rhythm: Normal rate.   Pulmonary:      Effort: Pulmonary effort is normal. No respiratory distress.   Skin:     General: Skin is warm and dry.   Neurological:      Mental Status: She is alert and oriented to person, place, and time.            Assessment and Plan     1. Class 1 obesity due to excess calories without serious comorbidity  with body mass index (BMI) of 32.0 to 32.9 in adult  -     metFORMIN (GLUCOPHAGE-XR) 500 MG ER 24hr tablet; Take 1 tablet (500 mg total) by mouth daily with breakfast.  Dispense: 90 tablet; Refill: 0    2. Encounter for weight loss counseling          - Log all food and beverage intake with a daily calorie goal of 1200 calories per day    - Moderate intensity aerobic exercise for 150 minutes per week

## 2024-04-23 DIAGNOSIS — Z30.41 ENCOUNTER FOR SURVEILLANCE OF CONTRACEPTIVE PILLS: ICD-10-CM

## 2024-04-24 RX ORDER — NORGESTIMATE AND ETHINYL ESTRADIOL 0.25-0.035
1 KIT ORAL DAILY
Qty: 84 TABLET | Refills: 0 | Status: SHIPPED | OUTPATIENT
Start: 2024-04-24

## 2024-05-30 ENCOUNTER — CLINICAL SUPPORT (OUTPATIENT)
Dept: OPHTHALMOLOGY | Facility: CLINIC | Age: 34
End: 2024-05-30
Payer: COMMERCIAL

## 2024-05-30 ENCOUNTER — OFFICE VISIT (OUTPATIENT)
Dept: OPTOMETRY | Facility: CLINIC | Age: 34
End: 2024-05-30
Payer: COMMERCIAL

## 2024-05-30 DIAGNOSIS — H40.013 OPEN ANGLE WITH BORDERLINE FINDINGS, LOW RISK, BILATERAL: Primary | ICD-10-CM

## 2024-05-30 DIAGNOSIS — H40.013 OPEN ANGLE WITH BORDERLINE FINDINGS, LOW RISK, BILATERAL: ICD-10-CM

## 2024-05-30 PROCEDURE — 1160F RVW MEDS BY RX/DR IN RCRD: CPT | Mod: CPTII,S$GLB,, | Performed by: OPTOMETRIST

## 2024-05-30 PROCEDURE — 99213 OFFICE O/P EST LOW 20 MIN: CPT | Mod: S$GLB,,, | Performed by: OPTOMETRIST

## 2024-05-30 PROCEDURE — 1159F MED LIST DOCD IN RCRD: CPT | Mod: CPTII,S$GLB,, | Performed by: OPTOMETRIST

## 2024-05-30 PROCEDURE — 99999 PR PBB SHADOW E&M-EST. PATIENT-LVL III: CPT | Mod: PBBFAC,,, | Performed by: OPTOMETRIST

## 2024-05-30 NOTE — PROGRESS NOTES
HPI    35 Y/o female is here for HVF,OCT,IOP no C/o about ocular health   Pt denies pain and discomfort  No f/f    Eye med: no gtt   Last edited by Tequila Martins MA on 5/30/2024  8:04 AM.            Assessment /Plan     For exam results, see Encounter Report.    Open angle with borderline findings, low risk, bilateral      1. Glauc susp by CDs--see OCT (RNFL wnl OU).  iop hi normal without meds.  VF today wnl.  +famhx (dad). Pach: 682/684.  Angles open by gonio OU.  Doubt glaucoma now, but due to fam hx advised yearly exam  2. Slight myopia--wrote dist Rx for night driving last visit.  Discussed CRIZAL for glare      PLAN:    Rtc 1 yr--full exam.  VF every 2 years (due 2026)

## 2024-06-10 ENCOUNTER — PATIENT MESSAGE (OUTPATIENT)
Dept: INTERNAL MEDICINE | Facility: CLINIC | Age: 34
End: 2024-06-10
Payer: COMMERCIAL

## 2024-06-26 ENCOUNTER — OFFICE VISIT (OUTPATIENT)
Dept: PSYCHIATRY | Facility: CLINIC | Age: 34
End: 2024-06-26
Payer: COMMERCIAL

## 2024-06-26 DIAGNOSIS — F32.A DEPRESSION, UNSPECIFIED DEPRESSION TYPE: ICD-10-CM

## 2024-06-26 DIAGNOSIS — F84.5 ASPERGER SYNDROME: Primary | ICD-10-CM

## 2024-06-26 DIAGNOSIS — F41.1 GAD (GENERALIZED ANXIETY DISORDER): ICD-10-CM

## 2024-06-26 PROCEDURE — 1160F RVW MEDS BY RX/DR IN RCRD: CPT | Mod: CPTII,95,, | Performed by: PSYCHIATRY & NEUROLOGY

## 2024-06-26 PROCEDURE — 1159F MED LIST DOCD IN RCRD: CPT | Mod: CPTII,95,, | Performed by: PSYCHIATRY & NEUROLOGY

## 2024-06-26 PROCEDURE — 99214 OFFICE O/P EST MOD 30 MIN: CPT | Mod: 95,,, | Performed by: PSYCHIATRY & NEUROLOGY

## 2024-06-26 RX ORDER — BUPROPION HYDROCHLORIDE 300 MG/1
300 TABLET ORAL DAILY
Qty: 90 TABLET | Refills: 1 | Status: SHIPPED | OUTPATIENT
Start: 2024-06-26

## 2024-06-26 RX ORDER — ESCITALOPRAM OXALATE 20 MG/1
20 TABLET ORAL DAILY
Qty: 90 TABLET | Refills: 1 | Status: SHIPPED | OUTPATIENT
Start: 2024-06-26

## 2024-06-26 NOTE — PROGRESS NOTES
"The patient location is: home  The chief complaint leading to consultation is: depression and anxiety    Visit type: audiovisual    Face to Face time with patient: 27 mins  32 minutes of total time spent on the encounter, which includes face to face time and non-face to face time preparing to see the patient (eg, review of tests), Obtaining and/or reviewing separately obtained history, Documenting clinical information in the electronic or other health record, Independently interpreting results (not separately reported) and communicating results to the patient/family/caregiver, or Care coordination (not separately reported).     Each patient to whom he or she provides medical services by telemedicine is:  (1) informed of the relationship between the physician and patient and the respective role of any other health care provider with respect to management of the patient; and (2) notified that he or she may decline to receive medical services by telemedicine and may withdraw from such care at any time.    Notes:     Outpatient Psychiatry Follow-Up Visit (MD/NP)    6/26/2024    Clinical Status of Patient:  Outpatient (Ambulatory)    Chief Complaint:  Tammy Bonner is a 34 y.o. female who presents today for follow-up of anxiety primarily, hx of low mood, stuttering, asperger's associated issues        Met with patient.      Interval History and Content of Current Session:  Interim Events/Subjective Report/Content of Current Session:   "Doing allright.  Take it day by day with work and life."  There have been layoffs at work.  She is handling work okay.  Still some difficult customers but managing to better manage the situations.  Mood has been "so so" and "mostly content."  Reports lack of motivation some days with work.  Denies episodes of crying in some time.  Reports seldom loses her temper which is related to menstral cycle.  Not had any episodes of desiring death.  Sleeps at least 7 hrs sometimes 8.  Denies " worry.      She has been going to the gym and seeing bariatrics.  She has lost 15 pounds since February.  She is taking metformin.      Still seeing oRxane Berger every other week through text and monthly virtually.        Psychotherapy:  Target symptoms: mood swings, impulsivity , SI  Why chosen therapy is appropriate versus another modality: relevant to diagnosis, patient responds to this modality  Outcome monitoring methods: self-report, observation  Therapeutic intervention type: supportive psychotherapy  Topics discussed/themes: work stress, building skills sets for symptom management, symptom recognition.    The patient's response to the intervention is accepting. The patient's progress toward treatment goals is fair.   Duration of intervention:  mins    Review of Systems   Constitutional:  Negative for chills and fever.   Respiratory:  Negative for cough, shortness of breath and wheezing.    Cardiovascular:  Negative for chest pain and palpitations.         Past Medical, Family and Social History: The patient's past medical, family and social history have been reviewed and updated as appropriate within the electronic medical record - see encounter notes.    Compliance: yes    Side effects: None    Risk Parameters:  Patient reports no suicidal ideation  Patient reports no homicidal ideation  Patient reports no self-injurious behavior  Patient reports no violent behavior    Exam (detailed: at least 9 elements; comprehensive: all 15 elements)   Constitutional  Vitals: 1 Most recent vital signs, dated less than 90 days prior to this appointment, were reviewed.    There were no vitals filed for this visit.     General:  age appropriate, casually dressed, neatly groomed     Musculoskeletal  Muscle Strength/Tone:  no dyskinesia, no tic   Gait & Station:  Not observed     Psychiatric  Speech:  clearly audible, not pressured , quite verbal   Mood:   Affect:  euthymic  congruent and appropriate   Thought Process:   logical   Associations:  intact   Thought Content:  normal, no suicidality, no homicidality, delusions, or paranoia   Insight:   Judgement:  fair  adequate to circumstances   Orientation:  grossly intact   Memory: intact for content of interview   Language: grossly intact   Attention Span & Concentration:  able to focus   Fund of Knowledge:  intact and appropriate to age and level of education     Assessment and Diagnosis   Status/Progress: Based on the examination today, the patient's problem(s) is/are well controlled.  New problems have been presented today.   Comorbidities are complicating management of the primary condition:Asperger's complicating her DORIS.  There are no active rule-out diagnoses for this patient at this time.    General Impression: 34 y.o. F with Asperger's, depression, and DORIS.  DORIS relatively well controlled currently.   Has some problems with anger at times, typically with parents.  Depression is a more recent problem.    Diagnosis:  Asperger's Disorder (299.80)  Generalized Anxiety Disorder (300.02).  Depression unspec      Intervention/Counseling/Treatment Plan   Continue Wellbutrin  mg daily  Continue lexapro 20 mg daily  Continue psychotherapy with Roxane Berger          Return to Clinic: 6 months

## 2024-07-16 ENCOUNTER — OFFICE VISIT (OUTPATIENT)
Dept: BARIATRICS | Facility: CLINIC | Age: 34
End: 2024-07-16
Payer: COMMERCIAL

## 2024-07-16 VITALS
WEIGHT: 162.63 LBS | HEART RATE: 85 BPM | DIASTOLIC BLOOD PRESSURE: 78 MMHG | OXYGEN SATURATION: 97 % | BODY MASS INDEX: 31.93 KG/M2 | SYSTOLIC BLOOD PRESSURE: 112 MMHG | HEIGHT: 60 IN

## 2024-07-16 DIAGNOSIS — Z71.3 ENCOUNTER FOR WEIGHT LOSS COUNSELING: ICD-10-CM

## 2024-07-16 DIAGNOSIS — E66.09 CLASS 1 OBESITY DUE TO EXCESS CALORIES WITHOUT SERIOUS COMORBIDITY WITH BODY MASS INDEX (BMI) OF 31.0 TO 31.9 IN ADULT: Primary | ICD-10-CM

## 2024-07-16 PROCEDURE — 1159F MED LIST DOCD IN RCRD: CPT | Mod: CPTII,S$GLB,, | Performed by: STUDENT IN AN ORGANIZED HEALTH CARE EDUCATION/TRAINING PROGRAM

## 2024-07-16 PROCEDURE — 99213 OFFICE O/P EST LOW 20 MIN: CPT | Mod: S$GLB,,, | Performed by: STUDENT IN AN ORGANIZED HEALTH CARE EDUCATION/TRAINING PROGRAM

## 2024-07-16 PROCEDURE — 3078F DIAST BP <80 MM HG: CPT | Mod: CPTII,S$GLB,, | Performed by: STUDENT IN AN ORGANIZED HEALTH CARE EDUCATION/TRAINING PROGRAM

## 2024-07-16 PROCEDURE — 3008F BODY MASS INDEX DOCD: CPT | Mod: CPTII,S$GLB,, | Performed by: STUDENT IN AN ORGANIZED HEALTH CARE EDUCATION/TRAINING PROGRAM

## 2024-07-16 PROCEDURE — 99999 PR PBB SHADOW E&M-EST. PATIENT-LVL III: CPT | Mod: PBBFAC,,, | Performed by: STUDENT IN AN ORGANIZED HEALTH CARE EDUCATION/TRAINING PROGRAM

## 2024-07-16 PROCEDURE — 1160F RVW MEDS BY RX/DR IN RCRD: CPT | Mod: CPTII,S$GLB,, | Performed by: STUDENT IN AN ORGANIZED HEALTH CARE EDUCATION/TRAINING PROGRAM

## 2024-07-16 PROCEDURE — 3074F SYST BP LT 130 MM HG: CPT | Mod: CPTII,S$GLB,, | Performed by: STUDENT IN AN ORGANIZED HEALTH CARE EDUCATION/TRAINING PROGRAM

## 2024-07-16 RX ORDER — METFORMIN HYDROCHLORIDE 500 MG/1
500 TABLET, EXTENDED RELEASE ORAL 2 TIMES DAILY WITH MEALS
Qty: 180 TABLET | Refills: 0 | Status: SHIPPED | OUTPATIENT
Start: 2024-07-16 | End: 2024-10-14

## 2024-07-16 NOTE — PROGRESS NOTES
Subjective     Patient ID: Tammy Bonner is a 34 y.o. female.    Chief Complaint: Obesity, Follow-up, and Weight Check    Patient presents for treatment of obesity.     Co-morbidities   Open angle with borderline findings, bilateral - has not had appt with optometry since prior to Covid  Anxiety and Depression - treated by Dr. Ugalde, on Lexapro, Wellbutrin XL    OCP for contraception    Negative for thyroid cancer    Weight History  Lowest adult weight: 145-150 lbs  Highest adult weight: 183 lbs     History of Weight Loss Efforts  No prior use of weight loss medications or structured programs    Current Physical Activity  Gym 1-2x/week - Radhika  [employer] Workouts - strength and core training  Treadmill     Current Eating Habits  Breakfast - cold brew; cereal (chocolate special K, cinnamon toast crunch); banana  Lunch - leftovers; roll ups with low carb wrap and cheese  Dinner - pizza, pasta, mac & cheese, corn, hank salad with blackened chicken  Snacks - apples and peanut butter, animal crackers  Beverages - mostly water, coffee, smoothie    Medical Weight Loss  2/6/2024: 178.5 lbs, BMI 34.9, BFP 39.2%, BFM 70 lbs, SMM 62.6 lbs, BMR 1433 kcal  4/22/2024: 167.2 lbs, BMI 32.7, BFP 37.2%, BFM 62 lbs, SMM 60.4 lbs, BMR 1399 kcal  7/16/2024: 162.6 lbs, BMI 31.8, BFP 37.2%, BFM 60.3 lbs, SMM 58.4 lbs, BMR 1371 kcal      Review of Systems   Constitutional:  Negative for chills and fever.   Respiratory:  Negative for shortness of breath.    Cardiovascular:  Negative for chest pain and palpitations.   Gastrointestinal:  Negative for abdominal pain, nausea and vomiting.   Neurological:  Negative for dizziness and light-headedness.          Objective    Latest Reference Range & Units 12/05/23 07:21   WBC 3.90 - 12.70 K/uL 8.64   RBC 4.00 - 5.40 M/uL 4.45   Hemoglobin 12.0 - 16.0 g/dL 13.5   Hematocrit 37.0 - 48.5 % 39.8   MCV 82 - 98 fL 89   MCH 27.0 - 31.0 pg 30.3   MCHC 32.0 - 36.0 g/dL 33.9   RDW 11.5 - 14.5  % 12.6   Platelet Count 150 - 450 K/uL 428   MPV 9.2 - 12.9 fL 9.2   Gran % 38.0 - 73.0 % 58.3   Lymph % 18.0 - 48.0 % 28.9   Mono % 4.0 - 15.0 % 8.3   Eos % 0.0 - 8.0 % 2.9   Basophil % 0.0 - 1.9 % 0.6   Immature Granulocytes 0.0 - 0.5 % 1.0 (H)   Gran # (ANC) 1.8 - 7.7 K/uL 5.0   Lymph # 1.0 - 4.8 K/uL 2.5   Mono # 0.3 - 1.0 K/uL 0.7   Eos # 0.0 - 0.5 K/uL 0.3   Baso # 0.00 - 0.20 K/uL 0.05   Immature Grans (Abs) 0.00 - 0.04 K/uL 0.09 (H)   nRBC 0 /100 WBC 0   Differential Method  Automated   Sodium 136 - 145 mmol/L 141   Potassium 3.5 - 5.1 mmol/L 4.4   Chloride 95 - 110 mmol/L 104   CO2 23 - 29 mmol/L 27   Anion Gap 8 - 16 mmol/L 10   BUN 6 - 20 mg/dL 14   Creatinine 0.5 - 1.4 mg/dL 0.9   eGFR >60 mL/min/1.73 m^2 >60.0   Glucose 70 - 110 mg/dL 96   Calcium 8.7 - 10.5 mg/dL 9.7   ALP 55 - 135 U/L 79   PROTEIN TOTAL 6.0 - 8.4 g/dL 7.5   Albumin 3.5 - 5.2 g/dL 3.7   BILIRUBIN TOTAL 0.1 - 1.0 mg/dL 0.5   AST 10 - 40 U/L 23   ALT 10 - 44 U/L 25   Cholesterol Total 120 - 199 mg/dL 219 (H)   HDL 40 - 75 mg/dL 72   HDL/Cholesterol Ratio 20.0 - 50.0 % 32.9   Non-HDL Cholesterol mg/dL 147   Total Cholesterol/HDL Ratio 2.0 - 5.0  3.0   Triglycerides 30 - 150 mg/dL 219 (H)   LDL Cholesterol 63.0 - 159.0 mg/dL 103.2   Hemoglobin A1C External 4.0 - 5.6 % 4.9   Estimated Avg Glucose 68 - 131 mg/dL 94   TSH 0.400 - 4.000 uIU/mL 2.084   (H): Data is abnormally high    Vitals:    07/16/24 1345   BP: 112/78   Pulse: 85         Physical Exam  Vitals reviewed.   Constitutional:       General: She is not in acute distress.     Appearance: Normal appearance. She is obese. She is not ill-appearing, toxic-appearing or diaphoretic.   HENT:      Head: Normocephalic and atraumatic.   Cardiovascular:      Rate and Rhythm: Normal rate.   Pulmonary:      Effort: Pulmonary effort is normal. No respiratory distress.   Skin:     General: Skin is warm and dry.   Neurological:      Mental Status: She is alert and oriented to person, place, and  time.            Assessment and Plan     1. Class 1 obesity due to excess calories without serious comorbidity with body mass index (BMI) of 31.0 to 31.9 in adult  -     metFORMIN (GLUCOPHAGE-XR) 500 MG ER 24hr tablet; Take 1 tablet (500 mg total) by mouth 2 (two) times daily with meals.  Dispense: 180 tablet; Refill: 0    2. Encounter for weight loss counseling            - Log all food and beverage intake with a daily calorie goal of 1200 calories per day    - Moderate intensity aerobic exercise for 150 minutes per week

## 2024-08-23 ENCOUNTER — PATIENT MESSAGE (OUTPATIENT)
Dept: INTERNAL MEDICINE | Facility: CLINIC | Age: 34
End: 2024-08-23
Payer: COMMERCIAL

## 2024-10-14 DIAGNOSIS — E66.811 CLASS 1 OBESITY DUE TO EXCESS CALORIES WITHOUT SERIOUS COMORBIDITY WITH BODY MASS INDEX (BMI) OF 31.0 TO 31.9 IN ADULT: ICD-10-CM

## 2024-10-14 DIAGNOSIS — E66.09 CLASS 1 OBESITY DUE TO EXCESS CALORIES WITHOUT SERIOUS COMORBIDITY WITH BODY MASS INDEX (BMI) OF 31.0 TO 31.9 IN ADULT: ICD-10-CM

## 2024-10-14 RX ORDER — METFORMIN HYDROCHLORIDE 500 MG/1
500 TABLET, EXTENDED RELEASE ORAL 2 TIMES DAILY WITH MEALS
Qty: 180 TABLET | Refills: 0 | Status: SHIPPED | OUTPATIENT
Start: 2024-10-14

## 2024-10-19 ENCOUNTER — PATIENT MESSAGE (OUTPATIENT)
Dept: INTERNAL MEDICINE | Facility: CLINIC | Age: 34
End: 2024-10-19
Payer: COMMERCIAL

## 2024-10-27 NOTE — PROGRESS NOTES
"Subjective:       Patient ID: Tammy Bonner is a 27 y.o. female.    Chief Complaint: Annual Exam   this is a 27-year-old who presents today for checkup.  She is brought in today by her daughter.  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  She continues have difficulty with anxiety has followed with psychiatry regularly and stays on citalopram which helps.  Mother feels she has been doing well from that standpoint as well she is recently seen the breast center for stable fibroadenomas a denies breast pain or discharge.  She has trouble with her hands with rash and tends to scratch a lot certain areas she does work with money and washes her hands often which plays a role in her fingers have some dermatitis     HPI  Review of Systems   Constitutional: Negative for fever.   Respiratory: Negative for cough, shortness of breath and wheezing.    Cardiovascular: Negative for chest pain and palpitations.   Gastrointestinal: Negative for abdominal pain and constipation.   Neurological: Negative for dizziness.   Psychiatric/Behavioral:        Stable anxiety        Objective:     Blood pressure 104/64, pulse 80, height 4' 11" (1.499 m), weight 67.5 kg (148 lb 13 oz).    Physical Exam   Constitutional: No distress.   HENT:   Head: Normocephalic.   Mouth/Throat: Oropharynx is clear and moist.   Eyes: No scleral icterus.   Neck: Neck supple.   Cardiovascular: Normal rate, regular rhythm and normal heart sounds.  Exam reveals no gallop and no friction rub.    No murmur heard.  Pulmonary/Chest: Effort normal and breath sounds normal. No respiratory distress.   Small mobile fibroadenoma  No tendernes    Abdominal: Soft. Bowel sounds are normal. She exhibits no mass. There is no tenderness.   Musculoskeletal: She exhibits no edema.   Neurological: She is alert. "   Skin: No erythema.   Psychiatric: She has a normal mood and affect.   Vitals reviewed.      Assessment:       1. Annual physical exam    2. Fibroadenoma of breast, unspecified laterality        Plan:       Tammy was seen today for annual exam.    Diagnoses and all orders for this visit:    Annual physical exam    Fibroadenoma of breast, unspecified laterality  History of she continues to follow with the breast center    Other orders  -     clobetasol 0.05% (TEMOVATE) 0.05 % Oint; Apply topically 2 (two) times daily as needed.  Discussed with patient avoid scratching consider gloves or alternate dating her handwashing routine which may be causing some increased dryness due to itching in areas of eczema trial of short-term use discussed    She'll schedule her annual GYN appointment when due    Labs reviewed continue regular exercise we discussed her weight trend up over time advised healthier exercise and dietary measures           - - -

## 2024-11-03 DIAGNOSIS — Z30.41 ENCOUNTER FOR SURVEILLANCE OF CONTRACEPTIVE PILLS: ICD-10-CM

## 2024-11-04 NOTE — TELEPHONE ENCOUNTER
Refill Routing Note   Medication(s) are not appropriate for processing by [facility] for the following reason(s):        Patient not seen by provider within 15 months    [facility] action(s):  Defer               Appointments  past 12m or future 3m with PCP    Date Provider   Last Visit   3/2/2023 Leticia Tanner MD   Next Visit   Visit date not found Leticia Tanner MD   ED visits in past 90 days: 0        Note composed:6:34 AM 11/04/2024

## 2024-11-05 RX ORDER — NORGESTIMATE AND ETHINYL ESTRADIOL 0.25-0.035
1 KIT ORAL DAILY
Qty: 84 TABLET | Refills: 0 | Status: SHIPPED | OUTPATIENT
Start: 2024-11-05

## 2024-11-06 ENCOUNTER — PATIENT MESSAGE (OUTPATIENT)
Dept: OBSTETRICS AND GYNECOLOGY | Facility: CLINIC | Age: 34
End: 2024-11-06
Payer: COMMERCIAL

## 2024-11-12 ENCOUNTER — OFFICE VISIT (OUTPATIENT)
Dept: BARIATRICS | Facility: CLINIC | Age: 34
End: 2024-11-12
Payer: COMMERCIAL

## 2024-11-12 VITALS
HEIGHT: 60 IN | OXYGEN SATURATION: 97 % | DIASTOLIC BLOOD PRESSURE: 72 MMHG | WEIGHT: 159.81 LBS | HEART RATE: 95 BPM | BODY MASS INDEX: 31.38 KG/M2 | SYSTOLIC BLOOD PRESSURE: 110 MMHG

## 2024-11-12 DIAGNOSIS — E66.811 CLASS 1 OBESITY DUE TO EXCESS CALORIES WITHOUT SERIOUS COMORBIDITY WITH BODY MASS INDEX (BMI) OF 31.0 TO 31.9 IN ADULT: Primary | ICD-10-CM

## 2024-11-12 DIAGNOSIS — Z71.3 ENCOUNTER FOR WEIGHT LOSS COUNSELING: ICD-10-CM

## 2024-11-12 DIAGNOSIS — E66.09 CLASS 1 OBESITY DUE TO EXCESS CALORIES WITHOUT SERIOUS COMORBIDITY WITH BODY MASS INDEX (BMI) OF 31.0 TO 31.9 IN ADULT: Primary | ICD-10-CM

## 2024-11-12 PROCEDURE — 3078F DIAST BP <80 MM HG: CPT | Mod: CPTII,S$GLB,, | Performed by: STUDENT IN AN ORGANIZED HEALTH CARE EDUCATION/TRAINING PROGRAM

## 2024-11-12 PROCEDURE — 3074F SYST BP LT 130 MM HG: CPT | Mod: CPTII,S$GLB,, | Performed by: STUDENT IN AN ORGANIZED HEALTH CARE EDUCATION/TRAINING PROGRAM

## 2024-11-12 PROCEDURE — 1159F MED LIST DOCD IN RCRD: CPT | Mod: CPTII,S$GLB,, | Performed by: STUDENT IN AN ORGANIZED HEALTH CARE EDUCATION/TRAINING PROGRAM

## 2024-11-12 PROCEDURE — 1160F RVW MEDS BY RX/DR IN RCRD: CPT | Mod: CPTII,S$GLB,, | Performed by: STUDENT IN AN ORGANIZED HEALTH CARE EDUCATION/TRAINING PROGRAM

## 2024-11-12 PROCEDURE — 99213 OFFICE O/P EST LOW 20 MIN: CPT | Mod: S$GLB,,, | Performed by: STUDENT IN AN ORGANIZED HEALTH CARE EDUCATION/TRAINING PROGRAM

## 2024-11-12 PROCEDURE — 3008F BODY MASS INDEX DOCD: CPT | Mod: CPTII,S$GLB,, | Performed by: STUDENT IN AN ORGANIZED HEALTH CARE EDUCATION/TRAINING PROGRAM

## 2024-11-12 PROCEDURE — 99999 PR PBB SHADOW E&M-EST. PATIENT-LVL III: CPT | Mod: PBBFAC,,, | Performed by: STUDENT IN AN ORGANIZED HEALTH CARE EDUCATION/TRAINING PROGRAM

## 2024-11-12 NOTE — PROGRESS NOTES
Subjective     Patient ID: Tammy Bonner is a 34 y.o. female.    Chief Complaint: Follow-up, Obesity, and Weight Check    Patient presents for treatment of obesity.     Co-morbidities   Open angle with borderline findings, bilateral - has not had appt with optometry since prior to Covid  Anxiety and Depression - treated by Dr. Ugalde, on Lexapro, Wellbutrin XL    OCP for contraception    Negative for thyroid cancer    Weight History  Lowest adult weight: 145-150 lbs  Highest adult weight: 183 lbs     History of Weight Loss Efforts  No prior use of weight loss medications or structured programs    Current Physical Activity  Gym 1-2x/week - Radhika  [employer] Workouts - strength and core training  Treadmill     Current Eating Habits  Breakfast - cold brew; cereal (chocolate special K, cinnamon toast crunch); banana  Lunch - leftovers; roll ups with low carb wrap and cheese  Dinner - pizza, pasta, mac & cheese, corn, hank salad with blackened chicken  Snacks - apples and peanut butter, animal crackers  Beverages - mostly water, coffee, smoothie    Medical Weight Loss  2/6/2024: 178.5 lbs, BMI 34.9, BFP 39.2%, BFM 70 lbs, SMM 62.6 lbs, BMR 1433 kcal  4/22/2024: 167.2 lbs, BMI 32.7, BFP 37.2%, BFM 62 lbs, SMM 60.4 lbs, BMR 1399 kcal  7/16/2024: 162.6 lbs, BMI 31.8, BFP 37.2%, BFM 60.3 lbs, SMM 58.4 lbs, BMR 1371 kcal  11/12/2024: 159.8 lbs, BMI 31.2, BFP 35.9%, BFM 57.5 lbs, SMM 58.2 lbs, BMR 1373 kcal      Review of Systems   Constitutional:  Negative for chills and fever.   Respiratory:  Negative for shortness of breath.    Cardiovascular:  Negative for chest pain and palpitations.   Gastrointestinal:  Negative for abdominal pain, nausea and vomiting.   Neurological:  Negative for dizziness and light-headedness.          Objective    Latest Reference Range & Units 12/05/23 07:21   WBC 3.90 - 12.70 K/uL 8.64   RBC 4.00 - 5.40 M/uL 4.45   Hemoglobin 12.0 - 16.0 g/dL 13.5   Hematocrit 37.0 - 48.5 % 39.8   MCV  82 - 98 fL 89   MCH 27.0 - 31.0 pg 30.3   MCHC 32.0 - 36.0 g/dL 33.9   RDW 11.5 - 14.5 % 12.6   Platelet Count 150 - 450 K/uL 428   MPV 9.2 - 12.9 fL 9.2   Gran % 38.0 - 73.0 % 58.3   Lymph % 18.0 - 48.0 % 28.9   Mono % 4.0 - 15.0 % 8.3   Eos % 0.0 - 8.0 % 2.9   Basophil % 0.0 - 1.9 % 0.6   Immature Granulocytes 0.0 - 0.5 % 1.0 (H)   Gran # (ANC) 1.8 - 7.7 K/uL 5.0   Lymph # 1.0 - 4.8 K/uL 2.5   Mono # 0.3 - 1.0 K/uL 0.7   Eos # 0.0 - 0.5 K/uL 0.3   Baso # 0.00 - 0.20 K/uL 0.05   Immature Grans (Abs) 0.00 - 0.04 K/uL 0.09 (H)   nRBC 0 /100 WBC 0   Differential Method  Automated   Sodium 136 - 145 mmol/L 141   Potassium 3.5 - 5.1 mmol/L 4.4   Chloride 95 - 110 mmol/L 104   CO2 23 - 29 mmol/L 27   Anion Gap 8 - 16 mmol/L 10   BUN 6 - 20 mg/dL 14   Creatinine 0.5 - 1.4 mg/dL 0.9   eGFR >60 mL/min/1.73 m^2 >60.0   Glucose 70 - 110 mg/dL 96   Calcium 8.7 - 10.5 mg/dL 9.7   ALP 55 - 135 U/L 79   PROTEIN TOTAL 6.0 - 8.4 g/dL 7.5   Albumin 3.5 - 5.2 g/dL 3.7   BILIRUBIN TOTAL 0.1 - 1.0 mg/dL 0.5   AST 10 - 40 U/L 23   ALT 10 - 44 U/L 25   Cholesterol Total 120 - 199 mg/dL 219 (H)   HDL 40 - 75 mg/dL 72   HDL/Cholesterol Ratio 20.0 - 50.0 % 32.9   Non-HDL Cholesterol mg/dL 147   Total Cholesterol/HDL Ratio 2.0 - 5.0  3.0   Triglycerides 30 - 150 mg/dL 219 (H)   LDL Cholesterol 63.0 - 159.0 mg/dL 103.2   Hemoglobin A1C External 4.0 - 5.6 % 4.9   Estimated Avg Glucose 68 - 131 mg/dL 94   TSH 0.400 - 4.000 uIU/mL 2.084   (H): Data is abnormally high    Vitals:    11/12/24 1446   BP: 110/72   Pulse: 95       Physical Exam  Vitals reviewed.   Constitutional:       General: She is not in acute distress.     Appearance: Normal appearance. She is obese. She is not ill-appearing, toxic-appearing or diaphoretic.   HENT:      Head: Normocephalic and atraumatic.   Cardiovascular:      Rate and Rhythm: Normal rate.   Pulmonary:      Effort: Pulmonary effort is normal. No respiratory distress.   Skin:     General: Skin is warm and dry.    Neurological:      Mental Status: She is alert and oriented to person, place, and time.            Assessment and Plan     1. Class 1 obesity due to excess calories without serious comorbidity with body mass index (BMI) of 31.0 to 31.9 in adult    2. Encounter for weight loss counseling      - Metformin  mg twice daily    - Log all food and beverage intake with a daily calorie goal of 1200 calories per day    - Moderate intensity aerobic exercise for 150 minutes per week

## 2024-11-30 ENCOUNTER — PATIENT MESSAGE (OUTPATIENT)
Dept: INTERNAL MEDICINE | Facility: CLINIC | Age: 34
End: 2024-11-30
Payer: COMMERCIAL

## 2024-11-30 DIAGNOSIS — Z00.00 ANNUAL PHYSICAL EXAM: Primary | ICD-10-CM

## 2024-12-09 ENCOUNTER — LAB VISIT (OUTPATIENT)
Dept: LAB | Facility: HOSPITAL | Age: 34
End: 2024-12-09
Attending: INTERNAL MEDICINE
Payer: COMMERCIAL

## 2024-12-09 DIAGNOSIS — Z00.00 ANNUAL PHYSICAL EXAM: ICD-10-CM

## 2024-12-09 LAB
ALBUMIN SERPL BCP-MCNC: 3.3 G/DL (ref 3.5–5.2)
ALP SERPL-CCNC: 85 U/L (ref 40–150)
ALT SERPL W/O P-5'-P-CCNC: 14 U/L (ref 10–44)
ANION GAP SERPL CALC-SCNC: 9 MMOL/L (ref 8–16)
AST SERPL-CCNC: 16 U/L (ref 10–40)
BASOPHILS # BLD AUTO: 0.06 K/UL (ref 0–0.2)
BASOPHILS NFR BLD: 0.6 % (ref 0–1.9)
BILIRUB SERPL-MCNC: 0.3 MG/DL (ref 0.1–1)
BUN SERPL-MCNC: 14 MG/DL (ref 6–20)
CALCIUM SERPL-MCNC: 9.5 MG/DL (ref 8.7–10.5)
CHLORIDE SERPL-SCNC: 104 MMOL/L (ref 95–110)
CHOLEST SERPL-MCNC: 199 MG/DL (ref 120–199)
CHOLEST/HDLC SERPL: 2.6 {RATIO} (ref 2–5)
CO2 SERPL-SCNC: 25 MMOL/L (ref 23–29)
CREAT SERPL-MCNC: 0.8 MG/DL (ref 0.5–1.4)
DIFFERENTIAL METHOD BLD: ABNORMAL
EOSINOPHIL # BLD AUTO: 0.5 K/UL (ref 0–0.5)
EOSINOPHIL NFR BLD: 4.6 % (ref 0–8)
ERYTHROCYTE [DISTWIDTH] IN BLOOD BY AUTOMATED COUNT: 12.7 % (ref 11.5–14.5)
EST. GFR  (NO RACE VARIABLE): >60 ML/MIN/1.73 M^2
ESTIMATED AVG GLUCOSE: 94 MG/DL (ref 68–131)
GLUCOSE SERPL-MCNC: 90 MG/DL (ref 70–110)
HBA1C MFR BLD: 4.9 % (ref 4–5.6)
HCT VFR BLD AUTO: 37.8 % (ref 37–48.5)
HDLC SERPL-MCNC: 78 MG/DL (ref 40–75)
HDLC SERPL: 39.2 % (ref 20–50)
HGB BLD-MCNC: 12 G/DL (ref 12–16)
IMM GRANULOCYTES # BLD AUTO: 0.04 K/UL (ref 0–0.04)
IMM GRANULOCYTES NFR BLD AUTO: 0.4 % (ref 0–0.5)
LDLC SERPL CALC-MCNC: 91.4 MG/DL (ref 63–159)
LYMPHOCYTES # BLD AUTO: 2.3 K/UL (ref 1–4.8)
LYMPHOCYTES NFR BLD: 23.2 % (ref 18–48)
MCH RBC QN AUTO: 29 PG (ref 27–31)
MCHC RBC AUTO-ENTMCNC: 31.7 G/DL (ref 32–36)
MCV RBC AUTO: 91 FL (ref 82–98)
MONOCYTES # BLD AUTO: 0.9 K/UL (ref 0.3–1)
MONOCYTES NFR BLD: 8.9 % (ref 4–15)
NEUTROPHILS # BLD AUTO: 6.1 K/UL (ref 1.8–7.7)
NEUTROPHILS NFR BLD: 62.3 % (ref 38–73)
NONHDLC SERPL-MCNC: 121 MG/DL
NRBC BLD-RTO: 0 /100 WBC
PLATELET # BLD AUTO: 434 K/UL (ref 150–450)
PMV BLD AUTO: 8.9 FL (ref 9.2–12.9)
POTASSIUM SERPL-SCNC: 4 MMOL/L (ref 3.5–5.1)
PROT SERPL-MCNC: 7.3 G/DL (ref 6–8.4)
RBC # BLD AUTO: 4.14 M/UL (ref 4–5.4)
SODIUM SERPL-SCNC: 138 MMOL/L (ref 136–145)
TRIGL SERPL-MCNC: 148 MG/DL (ref 30–150)
TSH SERPL DL<=0.005 MIU/L-ACNC: 2.41 UIU/ML (ref 0.4–4)
WBC # BLD AUTO: 9.78 K/UL (ref 3.9–12.7)

## 2024-12-09 PROCEDURE — 85025 COMPLETE CBC W/AUTO DIFF WBC: CPT | Performed by: INTERNAL MEDICINE

## 2024-12-09 PROCEDURE — 36415 COLL VENOUS BLD VENIPUNCTURE: CPT | Performed by: INTERNAL MEDICINE

## 2024-12-09 PROCEDURE — 83036 HEMOGLOBIN GLYCOSYLATED A1C: CPT | Performed by: INTERNAL MEDICINE

## 2024-12-09 PROCEDURE — 84443 ASSAY THYROID STIM HORMONE: CPT | Performed by: INTERNAL MEDICINE

## 2024-12-09 PROCEDURE — 80053 COMPREHEN METABOLIC PANEL: CPT | Performed by: INTERNAL MEDICINE

## 2024-12-09 PROCEDURE — 80061 LIPID PANEL: CPT | Performed by: INTERNAL MEDICINE

## 2024-12-10 ENCOUNTER — OFFICE VISIT (OUTPATIENT)
Dept: INTERNAL MEDICINE | Facility: CLINIC | Age: 34
End: 2024-12-10
Payer: COMMERCIAL

## 2024-12-10 VITALS
HEIGHT: 60 IN | DIASTOLIC BLOOD PRESSURE: 72 MMHG | SYSTOLIC BLOOD PRESSURE: 110 MMHG | WEIGHT: 161.19 LBS | OXYGEN SATURATION: 96 % | BODY MASS INDEX: 31.64 KG/M2 | HEART RATE: 98 BPM

## 2024-12-10 DIAGNOSIS — F43.23 ADJUSTMENT REACTION WITH ANXIETY AND DEPRESSION: ICD-10-CM

## 2024-12-10 DIAGNOSIS — E66.811 CLASS 1 OBESITY WITHOUT SERIOUS COMORBIDITY WITH BODY MASS INDEX (BMI) OF 31.0 TO 31.9 IN ADULT, UNSPECIFIED OBESITY TYPE: ICD-10-CM

## 2024-12-10 DIAGNOSIS — Z00.00 ANNUAL PHYSICAL EXAM: Primary | ICD-10-CM

## 2024-12-10 PROCEDURE — 3044F HG A1C LEVEL LT 7.0%: CPT | Mod: CPTII,S$GLB,, | Performed by: INTERNAL MEDICINE

## 2024-12-10 PROCEDURE — 99999 PR PBB SHADOW E&M-EST. PATIENT-LVL III: CPT | Mod: PBBFAC,,, | Performed by: INTERNAL MEDICINE

## 2024-12-10 PROCEDURE — 3008F BODY MASS INDEX DOCD: CPT | Mod: CPTII,S$GLB,, | Performed by: INTERNAL MEDICINE

## 2024-12-10 PROCEDURE — 3074F SYST BP LT 130 MM HG: CPT | Mod: CPTII,S$GLB,, | Performed by: INTERNAL MEDICINE

## 2024-12-10 PROCEDURE — 1159F MED LIST DOCD IN RCRD: CPT | Mod: CPTII,S$GLB,, | Performed by: INTERNAL MEDICINE

## 2024-12-10 PROCEDURE — 99395 PREV VISIT EST AGE 18-39: CPT | Mod: S$GLB,,, | Performed by: INTERNAL MEDICINE

## 2024-12-10 PROCEDURE — 3078F DIAST BP <80 MM HG: CPT | Mod: CPTII,S$GLB,, | Performed by: INTERNAL MEDICINE

## 2024-12-10 NOTE — PROGRESS NOTES
Subjective:       Patient ID: Tammy Bonner is a 34 y.o. female.    Chief Complaint: Annual Exam    History of Present Illness    CHIEF COMPLAINT:  Tammy presents today for annual follow-up.    MEDICATIONS AND WEIGHT MANAGEMENT:  She is currently taking Wellbutrin 300 mg, Lexapro 20 mg, Metformin twice daily, and birth control pills. She reports significant weight loss of over 20 lbs since her last visit a year ago, dropping from 185 to 161 lbs. She attributes this success to Metformin prescribed through the bariatric program, dietary changes including more home-cooked meals and reduced dining out, and regular exercise. Her exercise routine includes Radhika classes, spinning classes when possible, and 45-minute treadmill sessions.    GASTROINTESTINAL ISSUES:  She experienced an episode of stomach cramps and diarrhea approximately one month ago, lasting about a week. Symptoms included intermittent stomach cramps between bouts of diarrhea. The issues resolved after following the BRAT diet and gradually advancing to a regular diet. She tolerates metformin and no daily gi issues     GYNECOLOGICAL HEALTH:  She reports regular visits to her gynecologist for routine care including Pap smears and mammograms as needed. Her menstrual cycles have become heavier since switching to a new generic birth control pill called Maranda, which replaced her previous prescription when it was unavailable.    MENTAL HEALTH:  She reports regular visits to her psychiatrist every four months. Her anxiety and depression have been stable, and the antidepressant medications have been helpful. She also engages in counseling, which she finds beneficial.    DERMATOLOGICAL CONCERNS:  She reports experiencing bruising when itching and expresses concern about the possibility of an iron deficiency as a potential cause for this symptom.    SOCIAL HISTORY:  She recently  and is living in her 's inherited house. She is currently  employed at [employer]. She denies smoking.    VACCINATIONS:  She reports being up to date on vaccinations, having received both flu and COVID vaccinations a few months ago at a [facility] pharmacy in [facility]. She tolerated the vaccinations well.      ROS:  Constitutional: -chills, -fever, -weight change, +weight loss  Heent visual disturbance new glasses   Respiratory: -cough, -chest tightness, -shortness of breath, -wheezing  Cardiovascular: -chest pain, -palpitations  Gastrointestinal: episode of abdominal pain, -constipation, -nausea, -vomiting, +diarrhea resolved   Neurological: -dizziness, -weakness  Psychiatric: -anxiety, -depression  Skin: +itching        Review of Systems   Constitutional:  Positive for activity change. Negative for unexpected weight change.   HENT:  Negative for hearing loss, rhinorrhea and trouble swallowing.    Eyes:  Positive for visual disturbance. Negative for discharge.   Respiratory:  Negative for chest tightness and wheezing.    Cardiovascular:  Negative for chest pain and palpitations.   Gastrointestinal:  Negative for blood in stool, constipation, diarrhea and vomiting.   Endocrine: Negative for polydipsia and polyuria.   Genitourinary:  Negative for difficulty urinating, dysuria, hematuria and menstrual problem.   Musculoskeletal:  Negative for arthralgias, joint swelling and neck pain.   Neurological:  Negative for weakness and headaches.   Psychiatric/Behavioral:  Negative for confusion and dysphoric mood.        Objective:      Blood pressure 110/72, pulse 98, height 5' (1.524 m), weight 73.1 kg (161 lb 2.5 oz), SpO2 96%.   Physical Exam        Physical Exam    Constitutional: No acute distress.  HENT: Normocephalic. Oropharynx is clear.  Eyes: No scleral icterus.  Cardiovascular: Normal rate and rhythm. Normal heart sounds. No murmur heard. No rub. No gallop.  Lungs: Normal pulmonary effort. No respiratory distress. Normal breath sounds.  Abdomen: Bowel sounds are normal. Soft. No  mass. No abdominal tenderness.  Musculoskeletal: Neck supple.  Skin: No erythema.  Neurological: Alert.  Psychiatric: Mood normal.  Vitals: Blood pressure normal. Weight: 161 lbs.          Assessment:       1. Annual physical exam    2. Class 1 obesity without serious comorbidity with body mass index (BMI) of 31.0 to 31.9 in adult, unspecified obesity type    3. Adjustment reaction with anxiety and depression            Assessment & Plan    IMPRESSION:  - Reviewed annual exam results, noting improved blood pressure and weight loss  - Evaluated efficacy of Metformin for weight management and diabetes prevention, given family history  - Assessed cholesterol levels, noting improvement likely due to lifestyle changes and medication  - Considered potential causes of reported bruising, ruling out anemia based on lab results  - Determined current birth control regimen appropriate despite reported heavier menstrual cycles    Obesity weigh management   - Explained that Metformin helping with weight loss and diabetes prevention   - Continued Metformin twice daily.continue bariatric medicine and exercise     DEPRESSION MANAGEMENT:  Anxeity  Continues to follow with psyhicatry stable remains on   - Continued Wellbutrin 300mg daily.  - Continued Lexapro 20mg daily.    WEIGHT MANAGEMENT AND NUTRITION:  - Discussed relationship between weight loss, diet changes, and improved cholesterol levels.  - Tammy to maintain healthy eating habits, focusing on home-cooked meals and portion control.    EXERCISE REGIMEN:  - Tammy to continue current exercise regimen including Radhika, spinning classes, and treadmill use.    MENSTRUAL HEALTH:  - Provided information on potential iron supplementation for heavy menstrual cycles.    CONTRACEPTION:  She continues to follow with gyn hx fribroadenomas and   - Continued birth control pills (current generic brand Maranda).    FOLLOW-UP:  Labs reviewed she is up to date on her immunizations   -  Follow up in 1 year for next annual exam.  - Contact the office sooner if any health concerns arise.          Plan:       Tammy was seen today for annual exam.    Diagnoses and all orders for this visit:    Annual physical exam    Class 1 obesity without serious comorbidity with body mass index (BMI) of 31.0 to 31.9 in adult, unspecified obesity type    Adjustment reaction with anxiety and depression                This note was generated with the assistance of ambient listening technology. Verbal consent was obtained by the patient and accompanying visitor(s) for the recording of patient appointment to facilitate this note. I attest to having reviewed and edited the generated note for accuracy, though some syntax or spelling errors may persist. Please contact the author of this note for any clarification.

## 2024-12-18 ENCOUNTER — PATIENT MESSAGE (OUTPATIENT)
Dept: INTERNAL MEDICINE | Facility: CLINIC | Age: 34
End: 2024-12-18
Payer: COMMERCIAL

## 2024-12-18 DIAGNOSIS — H93.8X9 SENSATION OF PLUGGED EAR, UNSPECIFIED LATERALITY: Primary | ICD-10-CM

## 2024-12-18 RX ORDER — FLUTICASONE PROPIONATE 50 MCG
1 SPRAY, SUSPENSION (ML) NASAL DAILY
Qty: 9.9 ML | Refills: 3 | Status: SHIPPED | OUTPATIENT
Start: 2024-12-18

## 2024-12-30 ENCOUNTER — PATIENT MESSAGE (OUTPATIENT)
Dept: INTERNAL MEDICINE | Facility: CLINIC | Age: 34
End: 2024-12-30
Payer: COMMERCIAL

## 2025-01-06 ENCOUNTER — TELEPHONE (OUTPATIENT)
Dept: INTERNAL MEDICINE | Facility: CLINIC | Age: 35
End: 2025-01-06
Payer: COMMERCIAL

## 2025-01-06 NOTE — TELEPHONE ENCOUNTER
----- Message from Mar sent at 1/6/2025  3:42 PM CST -----  Contact: [phone]@[email or web address]  Patient is returning a phone call. Yes     Who left a message for the patient: Joan Lucero MA    Does patient know what this is regarding:      Would you like a call back, or a response through your [facility] portal?:   Call back     Comments: Patient says if she doesn't answer please message her on portal please

## (undated) DEVICE — SEE MEDLINE ITEM 152622

## (undated) DEVICE — SUT 2-0 12-18IN SILK

## (undated) DEVICE — SKINMARKER & RULER REGULAR X-F

## (undated) DEVICE — SUT MCRYL PLUS 4-0 PS2 27IN

## (undated) DEVICE — SEE MEDLINE ITEM 157194

## (undated) DEVICE — TRAY MINOR GEN SURG

## (undated) DEVICE — DRAIN CHANNEL ROUND 10FR

## (undated) DEVICE — CONTAINER SPECIMEN STRL 4OZ

## (undated) DEVICE — COVER LIGHT HANDLE 80/CA

## (undated) DEVICE — SEE MEDLINE ITEM 157128

## (undated) DEVICE — CORD BIPOLAR 12 FOOT

## (undated) DEVICE — ELECTRODE BLADE INSULATED 1 IN

## (undated) DEVICE — SUT 4-0 VICRYL / SH

## (undated) DEVICE — SUT 2/0 30IN SILK BLK BRAI

## (undated) DEVICE — ADHESIVE DERMABOND ADVANCED

## (undated) DEVICE — ADHESIVE MASTISOL VIAL 48/BX

## (undated) DEVICE — ELECTRODE REM PLYHSV RETURN 9

## (undated) DEVICE — GOWN SURGICAL X-LARGE

## (undated) DEVICE — HOOK LONE STAR BLUNT 12MM

## (undated) DEVICE — EVACUATOR WOUND BULB 100CC

## (undated) DEVICE — SPONGE GAUZE 16PLY 4X4

## (undated) DEVICE — SHEET EENT SPLIT

## (undated) DEVICE — SEE MEDLINE ITEM 154981

## (undated) DEVICE — STAPLER SKIN ROTATING HEAD

## (undated) DEVICE — SUT SILK 3-0 BLK PS-2 18IN

## (undated) DEVICE — NDL HYPO REG 25G X 1 1/2

## (undated) DEVICE — GAUZE SPONGE PEANUT STRL

## (undated) DEVICE — SUT LIGACLIP SMALL XTRA

## (undated) DEVICE — SUT VICRYL 3-0 27 SH

## (undated) DEVICE — CLIP MED TICALL

## (undated) DEVICE — SUT 3-0 12-18IN SILK